# Patient Record
Sex: FEMALE | Race: WHITE | NOT HISPANIC OR LATINO | Employment: OTHER | URBAN - NONMETROPOLITAN AREA
[De-identification: names, ages, dates, MRNs, and addresses within clinical notes are randomized per-mention and may not be internally consistent; named-entity substitution may affect disease eponyms.]

---

## 2017-01-17 ENCOUNTER — TRANSCRIBE ORDERS (OUTPATIENT)
Dept: ADMINISTRATIVE | Facility: HOSPITAL | Age: 82
End: 2017-01-17

## 2017-01-17 DIAGNOSIS — Z12.31 ENCOUNTER FOR SCREENING MAMMOGRAM FOR MALIGNANT NEOPLASM OF BREAST: Primary | ICD-10-CM

## 2017-01-30 ENCOUNTER — HOSPITAL ENCOUNTER (OUTPATIENT)
Dept: MAMMOGRAPHY | Facility: HOSPITAL | Age: 82
Discharge: HOME | End: 2017-01-30
Attending: INTERNAL MEDICINE | Admitting: INTERNAL MEDICINE

## 2017-01-30 ENCOUNTER — HOSPITAL ENCOUNTER (OUTPATIENT)
Dept: MAMMOGRAPHY | Facility: HOSPITAL | Age: 82
Discharge: HOME | End: 2017-01-30
Attending: INTERNAL MEDICINE

## 2017-01-30 DIAGNOSIS — Z12.31 ENCOUNTER FOR SCREENING MAMMOGRAM FOR MALIGNANT NEOPLASM OF BREAST: ICD-10-CM

## 2017-01-30 DIAGNOSIS — R92.2 BREAST DENSITY: ICD-10-CM

## 2017-01-30 PROCEDURE — G0206 DX MAMMO INCL CAD UNI: HCPCS

## 2017-01-30 PROCEDURE — 77063 BREAST TOMOSYNTHESIS BI: CPT

## 2017-01-30 PROCEDURE — G0202 SCR MAMMO BI INCL CAD: HCPCS

## 2017-01-30 PROCEDURE — G0279 TOMOSYNTHESIS, MAMMO: HCPCS

## 2017-09-06 ENCOUNTER — ANESTHESIA EVENT (OUTPATIENT)
Dept: OPERATING ROOM | Age: 82
End: 2017-09-06

## 2017-09-07 ENCOUNTER — ANESTHESIA (OUTPATIENT)
Dept: OPERATING ROOM | Age: 82
End: 2017-09-07
Payer: MEDICARE

## 2017-09-07 ENCOUNTER — HOSPITAL ENCOUNTER (OUTPATIENT)
Age: 82
Setting detail: OUTPATIENT SURGERY
Discharge: HOME | End: 2017-09-07
Attending: OPHTHALMOLOGY | Admitting: OPHTHALMOLOGY

## 2017-09-07 VITALS
BODY MASS INDEX: 23.36 KG/M2 | WEIGHT: 119 LBS | DIASTOLIC BLOOD PRESSURE: 72 MMHG | HEART RATE: 71 BPM | SYSTOLIC BLOOD PRESSURE: 137 MMHG | TEMPERATURE: 98 F | RESPIRATION RATE: 18 BRPM | OXYGEN SATURATION: 96 % | HEIGHT: 60 IN

## 2017-09-07 VITALS — SYSTOLIC BLOOD PRESSURE: 128 MMHG | OXYGEN SATURATION: 98 % | DIASTOLIC BLOOD PRESSURE: 73 MMHG

## 2017-09-07 PROCEDURE — 00142 ANES PX ON EYE LENS SURGERY: CPT | Performed by: NURSE ANESTHETIST, CERTIFIED REGISTERED

## 2017-09-07 PROCEDURE — V2632 POST CHMBR INTRAOCULAR LENS: HCPCS | Performed by: OPHTHALMOLOGY

## 2017-09-07 PROCEDURE — G8918 PT W/O PREOP ORDER IV AB PRO: HCPCS

## 2017-09-07 PROCEDURE — G8907 PT DOC NO EVENTS ON DISCHARG: HCPCS

## 2017-09-07 PROCEDURE — 66984 XCAPSL CTRC RMVL W/O ECP: CPT

## 2017-09-07 DEVICE — LENS INTOCU +24.5 DIOPT L13MM DIA6MM 0DEG HAPTIC ANG A: Type: IMPLANTABLE DEVICE | Site: EYE | Status: FUNCTIONAL

## 2017-09-07 RX ORDER — FENTANYL CITRATE 50 UG/ML
INJECTION, SOLUTION INTRAMUSCULAR; INTRAVENOUS PRN
Status: DISCONTINUED | OUTPATIENT
Start: 2017-09-07 | End: 2017-09-07 | Stop reason: SDUPTHER

## 2017-09-07 RX ORDER — SODIUM CHLORIDE, SODIUM LACTATE, POTASSIUM CHLORIDE, CALCIUM CHLORIDE 600; 310; 30; 20 MG/100ML; MG/100ML; MG/100ML; MG/100ML
INJECTION, SOLUTION INTRAVENOUS CONTINUOUS
Status: DISCONTINUED | OUTPATIENT
Start: 2017-09-07 | End: 2017-09-07 | Stop reason: HOSPADM

## 2017-09-07 RX ORDER — LISINOPRIL 2.5 MG/1
2.5 TABLET ORAL DAILY
COMMUNITY

## 2017-09-07 RX ORDER — ERGOCALCIFEROL 1.25 MG/1
50000 CAPSULE ORAL WEEKLY
COMMUNITY

## 2017-09-07 RX ORDER — SODIUM CHLORIDE 0.9 % (FLUSH) 0.9 %
10 SYRINGE (ML) INJECTION EVERY 12 HOURS SCHEDULED
Status: DISCONTINUED | OUTPATIENT
Start: 2017-09-07 | End: 2017-09-07 | Stop reason: HOSPADM

## 2017-09-07 RX ORDER — MIDAZOLAM HYDROCHLORIDE 1 MG/ML
INJECTION INTRAMUSCULAR; INTRAVENOUS PRN
Status: DISCONTINUED | OUTPATIENT
Start: 2017-09-07 | End: 2017-09-07 | Stop reason: SDUPTHER

## 2017-09-07 RX ORDER — MOXIFLOXACIN 5 MG/ML
SOLUTION/ DROPS OPHTHALMIC PRN
Status: DISCONTINUED | OUTPATIENT
Start: 2017-09-07 | End: 2017-09-07 | Stop reason: HOSPADM

## 2017-09-07 RX ORDER — LIDOCAINE HYDROCHLORIDE 10 MG/ML
1 INJECTION, SOLUTION EPIDURAL; INFILTRATION; INTRACAUDAL; PERINEURAL
Status: DISCONTINUED | OUTPATIENT
Start: 2017-09-07 | End: 2017-09-07 | Stop reason: HOSPADM

## 2017-09-07 RX ORDER — SODIUM CHLORIDE 0.9 % (FLUSH) 0.9 %
10 SYRINGE (ML) INJECTION PRN
Status: DISCONTINUED | OUTPATIENT
Start: 2017-09-07 | End: 2017-09-07 | Stop reason: HOSPADM

## 2017-09-07 RX ORDER — TETRACAINE HYDROCHLORIDE 5 MG/ML
SOLUTION OPHTHALMIC PRN
Status: DISCONTINUED | OUTPATIENT
Start: 2017-09-07 | End: 2017-09-07 | Stop reason: HOSPADM

## 2017-09-07 RX ORDER — CALCIUM CARBONATE 500(1250)
1500 TABLET ORAL DAILY
COMMUNITY

## 2017-09-07 RX ORDER — PREDNISOLONE ACETATE 10 MG/ML
SUSPENSION/ DROPS OPHTHALMIC PRN
Status: DISCONTINUED | OUTPATIENT
Start: 2017-09-07 | End: 2017-09-07 | Stop reason: HOSPADM

## 2017-09-07 RX ADMIN — FENTANYL CITRATE 50 MCG: 50 INJECTION, SOLUTION INTRAMUSCULAR; INTRAVENOUS at 08:16

## 2017-09-07 RX ADMIN — MIDAZOLAM HYDROCHLORIDE 1 MG: 1 INJECTION INTRAMUSCULAR; INTRAVENOUS at 08:16

## 2017-09-07 RX ADMIN — SODIUM CHLORIDE, SODIUM LACTATE, POTASSIUM CHLORIDE, CALCIUM CHLORIDE: 600; 310; 30; 20 INJECTION, SOLUTION INTRAVENOUS at 07:27

## 2017-12-08 ENCOUNTER — APPOINTMENT (OUTPATIENT)
Dept: WOUND CARE | Facility: HOSPITAL | Age: 82
End: 2017-12-08

## 2017-12-08 PROCEDURE — G0463 HOSPITAL OUTPT CLINIC VISIT: HCPCS

## 2017-12-13 ENCOUNTER — APPOINTMENT (OUTPATIENT)
Dept: WOUND CARE | Facility: HOSPITAL | Age: 82
End: 2017-12-13

## 2017-12-22 ENCOUNTER — APPOINTMENT (OUTPATIENT)
Dept: WOUND CARE | Facility: HOSPITAL | Age: 82
End: 2017-12-22

## 2017-12-22 PROCEDURE — G0463 HOSPITAL OUTPT CLINIC VISIT: HCPCS

## 2017-12-29 ENCOUNTER — APPOINTMENT (OUTPATIENT)
Dept: WOUND CARE | Facility: HOSPITAL | Age: 82
End: 2017-12-29

## 2017-12-29 PROCEDURE — G0463 HOSPITAL OUTPT CLINIC VISIT: HCPCS

## 2018-02-23 ENCOUNTER — TRANSCRIBE ORDERS (OUTPATIENT)
Dept: ADMINISTRATIVE | Facility: HOSPITAL | Age: 83
End: 2018-02-23

## 2018-02-23 DIAGNOSIS — N64.4 MASTODYNIA: ICD-10-CM

## 2018-02-23 DIAGNOSIS — Z78.0 ASYMPTOMATIC MENOPAUSAL STATE: ICD-10-CM

## 2018-02-23 DIAGNOSIS — M85.80 OTHER SPECIFIED DISORDERS OF BONE DENSITY AND STRUCTURE, UNSPECIFIED SITE (CODE): ICD-10-CM

## 2018-02-23 DIAGNOSIS — Z12.31 ENCOUNTER FOR SCREENING MAMMOGRAM FOR MALIGNANT NEOPLASM OF BREAST: Primary | ICD-10-CM

## 2018-03-08 ENCOUNTER — HOSPITAL ENCOUNTER (OUTPATIENT)
Dept: BONE DENSITY | Facility: HOSPITAL | Age: 83
Discharge: HOME | End: 2018-03-08
Attending: INTERNAL MEDICINE

## 2018-03-08 ENCOUNTER — TRANSCRIBE ORDERS (OUTPATIENT)
Dept: ADMINISTRATIVE | Facility: HOSPITAL | Age: 83
End: 2018-03-08

## 2018-03-08 ENCOUNTER — HOSPITAL ENCOUNTER (OUTPATIENT)
Dept: ULTRASOUND IMAGING | Facility: HOSPITAL | Age: 83
Discharge: HOME | End: 2018-03-08
Attending: INTERNAL MEDICINE

## 2018-03-08 ENCOUNTER — HOSPITAL ENCOUNTER (OUTPATIENT)
Dept: MAMMOGRAPHY | Facility: HOSPITAL | Age: 83
Discharge: HOME | End: 2018-03-08
Attending: INTERNAL MEDICINE | Admitting: INTERNAL MEDICINE

## 2018-03-08 DIAGNOSIS — M85.80 OTHER SPECIFIED DISORDERS OF BONE DENSITY AND STRUCTURE, UNSPECIFIED SITE (CODE): ICD-10-CM

## 2018-03-08 DIAGNOSIS — Z78.0 ASYMPTOMATIC MENOPAUSAL STATE: ICD-10-CM

## 2018-03-08 DIAGNOSIS — N64.4 MASTODYNIA: Primary | ICD-10-CM

## 2018-03-08 DIAGNOSIS — N64.4 MASTODYNIA: ICD-10-CM

## 2018-03-08 DIAGNOSIS — Z12.31 ENCOUNTER FOR SCREENING MAMMOGRAM FOR MALIGNANT NEOPLASM OF BREAST: ICD-10-CM

## 2018-03-08 DIAGNOSIS — N64.4 BREAST PAIN, RIGHT: ICD-10-CM

## 2018-03-08 DIAGNOSIS — Z12.39 ENCOUNTER FOR SCREENING FOR MALIGNANT NEOPLASM OF BREAST: Primary | ICD-10-CM

## 2018-03-08 DIAGNOSIS — Z12.39 ENCOUNTER FOR SCREENING FOR MALIGNANT NEOPLASM OF BREAST: ICD-10-CM

## 2018-03-08 PROCEDURE — 77066 DX MAMMO INCL CAD BI: CPT

## 2018-03-08 PROCEDURE — G0279 TOMOSYNTHESIS, MAMMO: HCPCS

## 2018-03-08 PROCEDURE — 77080 DXA BONE DENSITY AXIAL: CPT

## 2018-03-08 PROCEDURE — 76642 ULTRASOUND BREAST LIMITED: CPT

## 2018-12-04 ENCOUNTER — HOSPITAL ENCOUNTER (OUTPATIENT)
Dept: GENERAL RADIOLOGY | Facility: HOSPITAL | Age: 83
Discharge: HOME | End: 2018-12-04
Admitting: NURSE PRACTITIONER

## 2018-12-04 PROCEDURE — 71046 X-RAY EXAM CHEST 2 VIEWS: CPT

## 2019-02-15 ENCOUNTER — TRANSCRIBE ORDERS (OUTPATIENT)
Dept: ADMINISTRATIVE | Facility: HOSPITAL | Age: 84
End: 2019-02-15

## 2019-02-15 DIAGNOSIS — Z12.31 ENCOUNTER FOR SCREENING MAMMOGRAM FOR MALIGNANT NEOPLASM OF BREAST: Primary | ICD-10-CM

## 2019-03-11 ENCOUNTER — HOSPITAL ENCOUNTER (OUTPATIENT)
Dept: MAMMOGRAPHY | Facility: HOSPITAL | Age: 84
Discharge: HOME | End: 2019-03-11
Admitting: INTERNAL MEDICINE

## 2019-03-11 DIAGNOSIS — Z12.31 ENCOUNTER FOR SCREENING MAMMOGRAM FOR MALIGNANT NEOPLASM OF BREAST: ICD-10-CM

## 2019-03-11 PROCEDURE — 77063 BREAST TOMOSYNTHESIS BI: CPT

## 2019-03-11 PROCEDURE — 77067 SCR MAMMO BI INCL CAD: CPT

## 2019-09-20 ENCOUNTER — HOSPITAL ENCOUNTER (EMERGENCY)
Facility: HOSPITAL | Age: 84
Discharge: HOME | End: 2019-09-20
Attending: EMERGENCY MEDICINE | Admitting: EMERGENCY MEDICINE

## 2019-09-20 ENCOUNTER — APPOINTMENT (OUTPATIENT)
Dept: ULTRASOUND IMAGING | Facility: HOSPITAL | Age: 84
End: 2019-09-20

## 2019-09-20 VITALS
RESPIRATION RATE: 18 BRPM | SYSTOLIC BLOOD PRESSURE: 129 MMHG | TEMPERATURE: 98.4 F | WEIGHT: 117 LBS | HEIGHT: 70 IN | DIASTOLIC BLOOD PRESSURE: 81 MMHG | HEART RATE: 98 BPM | OXYGEN SATURATION: 94 % | BODY MASS INDEX: 16.75 KG/M2

## 2019-09-20 DIAGNOSIS — L03.116 CELLULITIS OF LEFT LEG: Primary | ICD-10-CM

## 2019-09-20 PROCEDURE — 93971 EXTREMITY STUDY: CPT | Performed by: SURGERY

## 2019-09-20 PROCEDURE — 93971 EXTREMITY STUDY: CPT

## 2019-09-20 PROCEDURE — 99283 EMERGENCY DEPT VISIT LOW MDM: CPT

## 2019-09-20 RX ORDER — ASPIRIN 81 MG/1
81 TABLET, CHEWABLE ORAL DAILY
COMMUNITY
End: 2019-10-14

## 2019-09-21 NOTE — ED PROVIDER NOTES
Subjective   She presents complaining of swelling in her left lower leg.  She says she bumped her leg 2 weeks ago and had an open wound on the anterior shin.  She dressed and took care of it herself.  However she has had progressive swelling with redness along the medial aspect of her left calf.  Some of this redness and swelling goes down to her left foot.  Her daughter found out about it today and took her to the clinic to be examined.  They told her she had cellulitis but that she also needed to come get a study to make sure she did not have a clot in her leg.        History provided by:  Patient and relative   used: No    Leg Swelling   Location:  Left lower leg  Quality:  Swelling and warm  Severity:  Moderate  Onset quality:  Gradual  Duration:  2 weeks  Timing:  Constant  Progression:  Worsening  Chronicity:  New  Associated symptoms: no abdominal pain, no chest pain, no congestion, no cough, no diarrhea, no fatigue, no fever, no myalgias, no rash, no rhinorrhea and no vomiting        Review of Systems   Constitutional: Negative.  Negative for fatigue and fever.   HENT: Negative.  Negative for congestion and rhinorrhea.    Respiratory: Negative.  Negative for cough.    Cardiovascular: Negative.  Negative for chest pain.   Gastrointestinal: Negative.  Negative for abdominal pain, diarrhea and vomiting.   Genitourinary: Negative.    Musculoskeletal: Negative for myalgias.   Skin: Negative.  Negative for rash.   Neurological: Negative.    Psychiatric/Behavioral: Negative.    All other systems reviewed and are negative.      Past Medical History:   Diagnosis Date   • Breast cancer (CMS/HCC) 1995    right    • Hx of radiation therapy    • Hypertension    • Vitamin D deficiency        Allergies   Allergen Reactions   • Penicillins Hives       Past Surgical History:   Procedure Laterality Date   • BREAST LUMPECTOMY Right 1995   • BREAST LUMPECTOMY Right    • HYSTERECTOMY         Family History    Problem Relation Age of Onset   • Breast cancer Mother        Social History     Socioeconomic History   • Marital status:      Spouse name: Not on file   • Number of children: Not on file   • Years of education: Not on file   • Highest education level: Not on file   Tobacco Use   • Smoking status: Never Smoker   • Smokeless tobacco: Never Used   Substance and Sexual Activity   • Alcohol use: No   • Drug use: No           Objective   Physical Exam   Constitutional: She is oriented to person, place, and time. She appears well-developed and well-nourished.   Cardiovascular: Normal rate and regular rhythm.   Pulmonary/Chest: Effort normal and breath sounds normal.   Musculoskeletal: Normal range of motion. She exhibits edema.   Patient does have some swelling of the medial aspect of her left calf with a lot of redness and heat to the calf.  Some of the redness and heat extends into the foot.  She has good distal pulses and sensation and no proximal signs of injury.  She has a healing wound over the anterior shin had about the same level of all the swelling.   Neurological: She is alert and oriented to person, place, and time.   Skin: Skin is warm and dry. Capillary refill takes less than 2 seconds. There is erythema.   Psychiatric: She has a normal mood and affect. Her behavior is normal.   Nursing note and vitals reviewed.      Procedures           ED Course  ED Course as of Sep 21 0151   Sat Sep 21, 2019   0151 I told the patient that the ultrasound did not show any clot and I think the diagnosis was correct at the previous clinic.  She is on good medication and she discontinued that.  She is discharged in stable condition.  [TR]      ED Course User Index  [TR] Haroldo Renee Jr., MD                  [facility]  Number of Diagnoses or Management Options  Cellulitis of left leg: new and requires workup     Amount and/or Complexity of Data Reviewed  Tests in the radiology section of CPT®: ordered and  reviewed    Risk of Complications, Morbidity, and/or Mortality  Presenting problems: moderate  Diagnostic procedures: moderate  Management options: moderate    Patient Progress  Patient progress: stable      Final diagnoses:   Cellulitis of left leg              Haroldo Renee Jr., MD  09/21/19 0151

## 2019-12-05 ENCOUNTER — APPOINTMENT (OUTPATIENT)
Dept: GENERAL RADIOLOGY | Facility: HOSPITAL | Age: 84
End: 2019-12-05

## 2019-12-05 ENCOUNTER — HOSPITAL ENCOUNTER (EMERGENCY)
Facility: HOSPITAL | Age: 84
Discharge: HOME | End: 2019-12-06
Attending: INTERNAL MEDICINE | Admitting: INTERNAL MEDICINE

## 2019-12-05 DIAGNOSIS — S52.352B TYPE I OR II OPEN DISPLACED COMMINUTED FRACTURE OF SHAFT OF LEFT RADIUS, INITIAL ENCOUNTER: Primary | ICD-10-CM

## 2019-12-05 PROCEDURE — 25010000002 PROCHLORPERAZINE 10 MG/2ML SOLUTION: Performed by: INTERNAL MEDICINE

## 2019-12-05 PROCEDURE — 25010000002 HYDROMORPHONE PER 4 MG: Performed by: INTERNAL MEDICINE

## 2019-12-05 PROCEDURE — 73110 X-RAY EXAM OF WRIST: CPT

## 2019-12-05 PROCEDURE — 96365 THER/PROPH/DIAG IV INF INIT: CPT

## 2019-12-05 PROCEDURE — 99285 EMERGENCY DEPT VISIT HI MDM: CPT

## 2019-12-05 PROCEDURE — 25010000003 CEFAZOLIN PER 500 MG: Performed by: INTERNAL MEDICINE

## 2019-12-05 PROCEDURE — 96375 TX/PRO/DX INJ NEW DRUG ADDON: CPT

## 2019-12-05 RX ORDER — HYDROMORPHONE HYDROCHLORIDE 1 MG/ML
0.5 INJECTION, SOLUTION INTRAMUSCULAR; INTRAVENOUS; SUBCUTANEOUS ONCE
Status: COMPLETED | OUTPATIENT
Start: 2019-12-05 | End: 2019-12-05

## 2019-12-05 RX ORDER — KETAMINE HYDROCHLORIDE 50 MG/ML
0.5 INJECTION, SOLUTION, CONCENTRATE INTRAMUSCULAR; INTRAVENOUS ONCE
Status: DISCONTINUED | OUTPATIENT
Start: 2019-12-05 | End: 2019-12-06

## 2019-12-05 RX ORDER — PROCHLORPERAZINE EDISYLATE 5 MG/ML
10 INJECTION INTRAMUSCULAR; INTRAVENOUS ONCE
Status: COMPLETED | OUTPATIENT
Start: 2019-12-05 | End: 2019-12-05

## 2019-12-05 RX ORDER — LISINOPRIL 2.5 MG/1
2.5 TABLET ORAL DAILY
COMMUNITY

## 2019-12-05 RX ORDER — KETAMINE HYDROCHLORIDE 100 MG/ML
INJECTION INTRAMUSCULAR; INTRAVENOUS
Status: COMPLETED | OUTPATIENT
Start: 2019-12-05 | End: 2019-12-05

## 2019-12-05 RX ORDER — LIDOCAINE HYDROCHLORIDE 10 MG/ML
10 INJECTION, SOLUTION EPIDURAL; INFILTRATION; INTRACAUDAL; PERINEURAL ONCE
Status: DISCONTINUED | OUTPATIENT
Start: 2019-12-05 | End: 2019-12-06 | Stop reason: HOSPADM

## 2019-12-05 RX ADMIN — KETAMINE HYDROCHLORIDE 26.1 MG: 100 INJECTION INTRAMUSCULAR; INTRAVENOUS at 23:29

## 2019-12-05 RX ADMIN — SODIUM CHLORIDE 1 G: 900 INJECTION INTRAVENOUS at 22:28

## 2019-12-05 RX ADMIN — PROCHLORPERAZINE EDISYLATE 10 MG: 5 INJECTION INTRAMUSCULAR; INTRAVENOUS at 22:18

## 2019-12-05 RX ADMIN — HYDROMORPHONE HYDROCHLORIDE 0.5 MG: 1 INJECTION, SOLUTION INTRAMUSCULAR; INTRAVENOUS; SUBCUTANEOUS at 22:19

## 2019-12-06 ENCOUNTER — APPOINTMENT (OUTPATIENT)
Dept: GENERAL RADIOLOGY | Facility: HOSPITAL | Age: 84
End: 2019-12-06

## 2019-12-06 PROCEDURE — 73110 X-RAY EXAM OF WRIST: CPT

## 2019-12-06 RX ORDER — HYDROCODONE BITARTRATE AND ACETAMINOPHEN 7.5; 325 MG/1; MG/1
1 TABLET ORAL EVERY 4 HOURS PRN
Qty: 18 TABLET | Refills: 0 | Status: SHIPPED | OUTPATIENT
Start: 2019-12-06 | End: 2021-04-26

## 2019-12-06 RX ORDER — ONDANSETRON 4 MG/1
4 TABLET, ORALLY DISINTEGRATING ORAL 4 TIMES DAILY PRN
Qty: 15 TABLET | Refills: 0 | Status: SHIPPED | OUTPATIENT
Start: 2019-12-06 | End: 2021-04-26

## 2019-12-31 VITALS
BODY MASS INDEX: 21.16 KG/M2 | SYSTOLIC BLOOD PRESSURE: 151 MMHG | DIASTOLIC BLOOD PRESSURE: 89 MMHG | RESPIRATION RATE: 14 BRPM | WEIGHT: 115 LBS | OXYGEN SATURATION: 92 % | HEIGHT: 62 IN | TEMPERATURE: 98 F | HEART RATE: 85 BPM

## 2020-02-26 ENCOUNTER — TRANSCRIBE ORDERS (OUTPATIENT)
Dept: ADMINISTRATIVE | Facility: HOSPITAL | Age: 85
End: 2020-02-26

## 2020-02-26 DIAGNOSIS — Z78.0 ASYMPTOMATIC MENOPAUSAL STATE: ICD-10-CM

## 2020-02-26 DIAGNOSIS — Z12.31 ENCOUNTER FOR SCREENING MAMMOGRAM FOR MALIGNANT NEOPLASM OF BREAST: Primary | ICD-10-CM

## 2020-05-29 ENCOUNTER — HOSPITAL ENCOUNTER (OUTPATIENT)
Dept: BONE DENSITY | Facility: HOSPITAL | Age: 85
Discharge: HOME | End: 2020-05-29

## 2020-05-29 ENCOUNTER — HOSPITAL ENCOUNTER (OUTPATIENT)
Dept: MAMMOGRAPHY | Facility: HOSPITAL | Age: 85
Discharge: HOME | End: 2020-05-29
Admitting: INTERNAL MEDICINE

## 2020-05-29 DIAGNOSIS — Z12.31 ENCOUNTER FOR SCREENING MAMMOGRAM FOR MALIGNANT NEOPLASM OF BREAST: ICD-10-CM

## 2020-05-29 DIAGNOSIS — Z78.0 ASYMPTOMATIC MENOPAUSAL STATE: ICD-10-CM

## 2020-05-29 PROCEDURE — 77067 SCR MAMMO BI INCL CAD: CPT

## 2020-05-29 PROCEDURE — 77080 DXA BONE DENSITY AXIAL: CPT

## 2020-05-29 PROCEDURE — 77063 BREAST TOMOSYNTHESIS BI: CPT

## 2020-06-25 PROBLEM — Z78.0 ASYMPTOMATIC POSTMENOPAUSAL STATUS: Status: ACTIVE | Noted: 2020-06-25

## 2020-06-25 RX ORDER — ZOLEDRONIC ACID 5 MG/100ML
5 INJECTION, SOLUTION INTRAVENOUS ONCE
Status: CANCELLED | OUTPATIENT
Start: 2020-11-17

## 2020-06-25 RX ORDER — SODIUM CHLORIDE 9 MG/ML
250 INJECTION, SOLUTION INTRAVENOUS ONCE
Status: CANCELLED | OUTPATIENT
Start: 2020-11-17

## 2020-07-08 ENCOUNTER — TRANSCRIBE ORDERS (OUTPATIENT)
Dept: ADMINISTRATIVE | Facility: HOSPITAL | Age: 85
End: 2020-07-08

## 2020-07-08 ENCOUNTER — TELEPHONE (OUTPATIENT)
Dept: ONCOLOGY | Facility: HOSPITAL | Age: 85
End: 2020-07-08

## 2020-07-08 DIAGNOSIS — M81.0 AGE-RELATED OSTEOPOROSIS WITHOUT CURRENT PATHOLOGICAL FRACTURE: Primary | ICD-10-CM

## 2020-07-08 NOTE — TELEPHONE ENCOUNTER
Dr. Scott has faxed an order forReclast with note to call the patient's daughter, Wendy at [phone] to schedule.    I have called the number above as well as, the one in Epic [phone].    The daughter has called back to schedule.

## 2020-07-16 ENCOUNTER — APPOINTMENT (OUTPATIENT)
Dept: LAB | Facility: HOSPITAL | Age: 85
End: 2020-07-16

## 2020-07-16 ENCOUNTER — APPOINTMENT (OUTPATIENT)
Dept: ONCOLOGY | Facility: HOSPITAL | Age: 85
End: 2020-07-16

## 2020-09-29 ENCOUNTER — TELEPHONE (OUTPATIENT)
Dept: GASTROENTEROLOGY | Facility: CLINIC | Age: 85
End: 2020-09-29

## 2020-09-29 NOTE — TELEPHONE ENCOUNTER
I have sent 2 letters to pt re: recall colon and have had no response. I called and spoke to pt's daughter, Wendy. She tells me pt has some other health issues going on right now that they want to take care of prior to discussing a colonoscopy-they did receive my letters. She will talk to pt about it and if needed they will call the office back to schedule an appt. I did advise Wendy that at pt's age it may not be necessary especially if she isn't having any GI issues. I am removing pt from my recall list but they were advised to call back to schedule appt if they felt she wanted to proceed.

## 2020-11-17 ENCOUNTER — INFUSION (OUTPATIENT)
Dept: ONCOLOGY | Facility: HOSPITAL | Age: 85
End: 2020-11-17

## 2020-11-17 ENCOUNTER — LAB (OUTPATIENT)
Dept: LAB | Facility: HOSPITAL | Age: 85
End: 2020-11-17

## 2020-11-17 VITALS
HEART RATE: 66 BPM | RESPIRATION RATE: 18 BRPM | BODY MASS INDEX: 23.99 KG/M2 | DIASTOLIC BLOOD PRESSURE: 68 MMHG | SYSTOLIC BLOOD PRESSURE: 131 MMHG | HEIGHT: 57 IN | TEMPERATURE: 97.5 F | WEIGHT: 111.2 LBS | OXYGEN SATURATION: 96 %

## 2020-11-17 DIAGNOSIS — M81.0 AGE RELATED OSTEOPOROSIS, UNSPECIFIED PATHOLOGICAL FRACTURE PRESENCE: Primary | ICD-10-CM

## 2020-11-17 DIAGNOSIS — Z78.0 ASYMPTOMATIC POSTMENOPAUSAL STATUS: Primary | ICD-10-CM

## 2020-11-17 DIAGNOSIS — Z78.0 ASYMPTOMATIC POSTMENOPAUSAL STATUS: ICD-10-CM

## 2020-11-17 DIAGNOSIS — M81.0 AGE-RELATED OSTEOPOROSIS WITHOUT CURRENT PATHOLOGICAL FRACTURE: ICD-10-CM

## 2020-11-17 LAB
CALCIUM SPEC-SCNC: 9.2 MG/DL (ref 8.6–10.5)
CREAT SERPL-MCNC: 0.62 MG/DL (ref 0.57–1)
GFR SERPL CREATININE-BSD FRML MDRD: 91 ML/MIN/1.73
MAGNESIUM SERPL-MCNC: 2.1 MG/DL (ref 1.6–2.4)
PHOSPHATE SERPL-MCNC: 3.6 MG/DL (ref 2.5–4.5)
WHOLE BLOOD HOLD SPECIMEN: NORMAL

## 2020-11-17 PROCEDURE — 82310 ASSAY OF CALCIUM: CPT

## 2020-11-17 PROCEDURE — 83735 ASSAY OF MAGNESIUM: CPT

## 2020-11-17 PROCEDURE — 96365 THER/PROPH/DIAG IV INF INIT: CPT

## 2020-11-17 PROCEDURE — 36415 COLL VENOUS BLD VENIPUNCTURE: CPT

## 2020-11-17 PROCEDURE — 82565 ASSAY OF CREATININE: CPT

## 2020-11-17 PROCEDURE — 84100 ASSAY OF PHOSPHORUS: CPT

## 2020-11-17 PROCEDURE — 25010000002 ZOLEDRONIC ACID 5 MG/100ML SOLUTION: Performed by: INTERNAL MEDICINE

## 2020-11-17 RX ORDER — SODIUM CHLORIDE 9 MG/ML
250 INJECTION, SOLUTION INTRAVENOUS ONCE
Status: CANCELLED | OUTPATIENT
Start: 2021-11-17

## 2020-11-17 RX ORDER — ZOLEDRONIC ACID 5 MG/100ML
5 INJECTION, SOLUTION INTRAVENOUS ONCE
Status: CANCELLED | OUTPATIENT
Start: 2021-11-17

## 2020-11-17 RX ORDER — ZOLEDRONIC ACID 5 MG/100ML
5 INJECTION, SOLUTION INTRAVENOUS ONCE
Status: COMPLETED | OUTPATIENT
Start: 2020-11-17 | End: 2020-11-17

## 2020-11-17 RX ORDER — SODIUM CHLORIDE 9 MG/ML
250 INJECTION, SOLUTION INTRAVENOUS ONCE
Status: COMPLETED | OUTPATIENT
Start: 2020-11-17 | End: 2020-11-17

## 2020-11-17 RX ADMIN — SODIUM CHLORIDE 250 ML: 9 INJECTION, SOLUTION INTRAVENOUS at 14:06

## 2020-11-17 RX ADMIN — ZOLEDRONIC ACID 5 MG: 5 INJECTION, SOLUTION INTRAVENOUS at 14:06

## 2020-11-17 NOTE — PATIENT INSTRUCTIONS
Zoledronic Acid injection (Paget's Disease, Osteoporosis)  What is this medicine?  ZOLEDRONIC ACID (MODE le juaniton ik AS id) lowers the amount of calcium loss from bone. It is used to treat Paget's disease and osteoporosis in women.  This medicine may be used for other purposes; ask your health care provider or pharmacist if you have questions.  COMMON BRAND NAME(S): Reclast, Zometa  What should I tell my health care provider before I take this medicine?  They need to know if you have any of these conditions:  · aspirin-sensitive asthma  · cancer, especially if you are receiving medicines used to treat cancer  · dental disease or wear dentures  · infection  · kidney disease  · low levels of calcium in the blood  · past surgery on the parathyroid gland or intestines  · receiving corticosteroids like dexamethasone or prednisone  · an unusual or allergic reaction to zoledronic acid, other medicines, foods, dyes, or preservatives  · pregnant or trying to get pregnant  · breast-feeding  How should I use this medicine?  This medicine is for infusion into a vein. It is given by a health care professional in a hospital or clinic setting.  Talk to your pediatrician regarding the use of this medicine in children. This medicine is not approved for use in children.  Overdosage: If you think you have taken too much of this medicine contact a poison control center or emergency room at once.  NOTE: This medicine is only for you. Do not share this medicine with others.  What if I miss a dose?  It is important not to miss your dose. Call your doctor or health care professional if you are unable to keep an appointment.  What may interact with this medicine?  · certain antibiotics given by injection  · NSAIDs, medicines for pain and inflammation, like ibuprofen or naproxen  · some diuretics like bumetanide, furosemide  · teriparatide  This list may not describe all possible interactions. Give your health care provider a list of all the  medicines, herbs, non-prescription drugs, or dietary supplements you use. Also tell them if you smoke, drink alcohol, or use illegal drugs. Some items may interact with your medicine.  What should I watch for while using this medicine?  Visit your doctor or health care professional for regular checkups. It may be some time before you see the benefit from this medicine. Do not stop taking your medicine unless your doctor tells you to. Your doctor may order blood tests or other tests to see how you are doing.  Women should inform their doctor if they wish to become pregnant or think they might be pregnant. There is a potential for serious side effects to an unborn child. Talk to your health care professional or pharmacist for more information.  You should make sure that you get enough calcium and vitamin D while you are taking this medicine. Discuss the foods you eat and the vitamins you take with your health care professional.  Some people who take this medicine have severe bone, joint, and/or muscle pain. This medicine may also increase your risk for jaw problems or a broken thigh bone. Tell your doctor right away if you have severe pain in your jaw, bones, joints, or muscles. Tell your doctor if you have any pain that does not go away or that gets worse.  Tell your dentist and dental surgeon that you are taking this medicine. You should not have major dental surgery while on this medicine. See your dentist to have a dental exam and fix any dental problems before starting this medicine. Take good care of your teeth while on this medicine. Make sure you see your dentist for regular follow-up appointments.  What side effects may I notice from receiving this medicine?  Side effects that you should report to your doctor or health care professional as soon as possible:  · allergic reactions like skin rash, itching or hives, swelling of the face, lips, or tongue  · anxiety, confusion, or depression  · breathing  problems  · changes in vision  · eye pain  · feeling faint or lightheaded, falls  · jaw pain, especially after dental work  · mouth sores  · muscle cramps, stiffness, or weakness  · redness, blistering, peeling or loosening of the skin, including inside the mouth  · trouble passing urine or change in the amount of urine  Side effects that usually do not require medical attention (report to your doctor or health care professional if they continue or are bothersome):  · bone, joint, or muscle pain  · constipation  · diarrhea  · fever  · hair loss  · irritation at site where injected  · loss of appetite  · nausea, vomiting  · stomach upset  · trouble sleeping  · trouble swallowing  · weak or tired  This list may not describe all possible side effects. Call your doctor for medical advice about side effects. You may report side effects to FDA at 9-313-FDA-0107.  Where should I keep my medicine?  This drug is given in a hospital or clinic and will not be stored at home.  NOTE: This sheet is a summary. It may not cover all possible information. If you have questions about this medicine, talk to your doctor, pharmacist, or health care provider.  © 2020 [employer]/Gold Standard (2015-05-16 14:19:57)

## 2021-04-26 ENCOUNTER — HOSPITAL ENCOUNTER (OUTPATIENT)
Dept: GENERAL RADIOLOGY | Facility: HOSPITAL | Age: 86
Discharge: HOME | End: 2021-04-26
Admitting: NURSE PRACTITIONER

## 2021-04-26 PROCEDURE — 71046 X-RAY EXAM CHEST 2 VIEWS: CPT

## 2021-06-01 ENCOUNTER — TRANSCRIBE ORDERS (OUTPATIENT)
Dept: ADMINISTRATIVE | Facility: HOSPITAL | Age: 86
End: 2021-06-01

## 2021-06-01 DIAGNOSIS — Z12.31 ENCOUNTER FOR SCREENING MAMMOGRAM FOR MALIGNANT NEOPLASM OF BREAST: Primary | ICD-10-CM

## 2021-06-02 ENCOUNTER — HOSPITAL ENCOUNTER (OUTPATIENT)
Dept: MAMMOGRAPHY | Facility: HOSPITAL | Age: 86
Discharge: HOME | End: 2021-06-02
Admitting: PHYSICIAN ASSISTANT

## 2021-06-02 DIAGNOSIS — Z12.31 ENCOUNTER FOR SCREENING MAMMOGRAM FOR MALIGNANT NEOPLASM OF BREAST: ICD-10-CM

## 2021-06-02 PROCEDURE — 77067 SCR MAMMO BI INCL CAD: CPT

## 2021-06-02 PROCEDURE — 77063 BREAST TOMOSYNTHESIS BI: CPT

## 2022-02-28 ENCOUNTER — TRANSCRIBE ORDERS (OUTPATIENT)
Dept: ADMINISTRATIVE | Facility: HOSPITAL | Age: 87
End: 2022-02-28

## 2022-02-28 DIAGNOSIS — M81.0 AGE-RELATED OSTEOPOROSIS WITHOUT CURRENT PATHOLOGICAL FRACTURE: Primary | ICD-10-CM

## 2022-06-08 ENCOUNTER — HOSPITAL ENCOUNTER (OUTPATIENT)
Dept: BONE DENSITY | Facility: HOSPITAL | Age: 87
Discharge: HOME | End: 2022-06-08

## 2022-06-08 ENCOUNTER — HOSPITAL ENCOUNTER (OUTPATIENT)
Dept: MAMMOGRAPHY | Facility: HOSPITAL | Age: 87
Discharge: HOME | End: 2022-06-08

## 2022-06-08 ENCOUNTER — TRANSCRIBE ORDERS (OUTPATIENT)
Dept: ADMINISTRATIVE | Facility: HOSPITAL | Age: 87
End: 2022-06-08

## 2022-06-08 DIAGNOSIS — M81.0 AGE-RELATED OSTEOPOROSIS WITHOUT CURRENT PATHOLOGICAL FRACTURE: ICD-10-CM

## 2022-06-08 DIAGNOSIS — Z12.31 ENCOUNTER FOR SCREENING MAMMOGRAM FOR MALIGNANT NEOPLASM OF BREAST: Primary | ICD-10-CM

## 2022-06-08 PROCEDURE — 77080 DXA BONE DENSITY AXIAL: CPT

## 2022-06-08 PROCEDURE — 77067 SCR MAMMO BI INCL CAD: CPT

## 2022-06-08 PROCEDURE — 77063 BREAST TOMOSYNTHESIS BI: CPT

## 2022-06-17 ENCOUNTER — HOSPITAL ENCOUNTER (INPATIENT)
Facility: HOSPITAL | Age: 87
LOS: 6 days | Discharge: HOME | End: 2022-06-24
Attending: INTERNAL MEDICINE | Admitting: INTERNAL MEDICINE

## 2022-06-17 ENCOUNTER — APPOINTMENT (OUTPATIENT)
Dept: CT IMAGING | Facility: HOSPITAL | Age: 87
End: 2022-06-17

## 2022-06-17 ENCOUNTER — APPOINTMENT (OUTPATIENT)
Dept: GENERAL RADIOLOGY | Facility: HOSPITAL | Age: 87
End: 2022-06-17

## 2022-06-17 DIAGNOSIS — Z74.09 IMPAIRED FUNCTIONAL MOBILITY AND ACTIVITY TOLERANCE: ICD-10-CM

## 2022-06-17 DIAGNOSIS — R41.0 CONFUSION: ICD-10-CM

## 2022-06-17 DIAGNOSIS — E87.1 HYPONATREMIA: Primary | ICD-10-CM

## 2022-06-17 LAB
ALBUMIN SERPL-MCNC: 3.7 G/DL (ref 3.5–5.2)
ALBUMIN/GLOB SERPL: 0.9 G/DL
ALP SERPL-CCNC: 64 U/L (ref 39–117)
ALT SERPL W P-5'-P-CCNC: 57 U/L (ref 1–33)
ANION GAP SERPL CALCULATED.3IONS-SCNC: 11 MMOL/L (ref 5–15)
ANION GAP SERPL CALCULATED.3IONS-SCNC: 12 MMOL/L (ref 5–15)
ANISOCYTOSIS BLD QL: ABNORMAL
AST SERPL-CCNC: 55 U/L (ref 1–32)
BACTERIA UR QL AUTO: ABNORMAL /HPF
BILIRUB SERPL-MCNC: 0.5 MG/DL (ref 0–1.2)
BILIRUB UR QL STRIP: NEGATIVE
BUN SERPL-MCNC: 12 MG/DL (ref 8–23)
BUN SERPL-MCNC: 13 MG/DL (ref 8–23)
BUN/CREAT SERPL: 17.1 (ref 7–25)
BUN/CREAT SERPL: 19.7 (ref 7–25)
BURR CELLS BLD QL SMEAR: ABNORMAL
CALCIUM SPEC-SCNC: 7.8 MG/DL (ref 8.2–9.6)
CALCIUM SPEC-SCNC: 8.5 MG/DL (ref 8.2–9.6)
CHLORIDE SERPL-SCNC: 88 MMOL/L (ref 98–107)
CHLORIDE SERPL-SCNC: 90 MMOL/L (ref 98–107)
CLARITY UR: CLEAR
CO2 SERPL-SCNC: 22 MMOL/L (ref 22–29)
CO2 SERPL-SCNC: 24 MMOL/L (ref 22–29)
COLOR UR: ABNORMAL
CREAT SERPL-MCNC: 0.61 MG/DL (ref 0.57–1)
CREAT SERPL-MCNC: 0.76 MG/DL (ref 0.57–1)
D-LACTATE SERPL-SCNC: 1.3 MMOL/L (ref 0.5–2)
DEPRECATED RDW RBC AUTO: 45.1 FL (ref 37–54)
EGFRCR SERPLBLD CKD-EPI 2021: 74.5 ML/MIN/1.73
EGFRCR SERPLBLD CKD-EPI 2021: 85.1 ML/MIN/1.73
ERYTHROCYTE [DISTWIDTH] IN BLOOD BY AUTOMATED COUNT: 13.5 % (ref 12.3–15.4)
GLOBULIN UR ELPH-MCNC: 4.2 GM/DL
GLUCOSE SERPL-MCNC: 103 MG/DL (ref 65–99)
GLUCOSE SERPL-MCNC: 111 MG/DL (ref 65–99)
GLUCOSE UR STRIP-MCNC: NEGATIVE MG/DL
HCT VFR BLD AUTO: 45.8 % (ref 34–46.6)
HGB BLD-MCNC: 15.6 G/DL (ref 12–15.9)
HGB UR QL STRIP.AUTO: ABNORMAL
HOLD SPECIMEN: NORMAL
HYALINE CASTS UR QL AUTO: ABNORMAL /LPF
KETONES UR QL STRIP: ABNORMAL
LEUKOCYTE ESTERASE UR QL STRIP.AUTO: NEGATIVE
LIPASE SERPL-CCNC: 21 U/L (ref 13–60)
LYMPHOCYTES # BLD MANUAL: 2.13 10*3/MM3 (ref 0.7–3.1)
LYMPHOCYTES NFR BLD MANUAL: 2 % (ref 5–12)
MACROCYTES BLD QL SMEAR: ABNORMAL
MAGNESIUM SERPL-MCNC: 1.9 MG/DL (ref 1.6–2.4)
MCH RBC QN AUTO: 30.7 PG (ref 26.6–33)
MCHC RBC AUTO-ENTMCNC: 34.1 G/DL (ref 31.5–35.7)
MCV RBC AUTO: 90.2 FL (ref 79–97)
METAMYELOCYTES NFR BLD MANUAL: 1 % (ref 0–0)
MONOCYTES # BLD: 0.18 10*3/MM3 (ref 0.1–0.9)
MYELOCYTES NFR BLD MANUAL: 1 % (ref 0–0)
NEUTROPHILS # BLD AUTO: 6.4 10*3/MM3 (ref 1.7–7)
NEUTROPHILS NFR BLD MANUAL: 65 % (ref 42.7–76)
NEUTS BAND NFR BLD MANUAL: 7 % (ref 0–5)
NITRITE UR QL STRIP: NEGATIVE
OSMOLALITY SERPL: 256 MOSM/KG (ref 280–301)
OSMOLALITY UR: 637 MOSM/KG (ref 50–1400)
PH UR STRIP.AUTO: 5.5 [PH] (ref 5–8)
PLAT MORPH BLD: NORMAL
PLATELET # BLD AUTO: 247 10*3/MM3 (ref 140–450)
PMV BLD AUTO: 9.4 FL (ref 6–12)
POIKILOCYTOSIS BLD QL SMEAR: ABNORMAL
POLYCHROMASIA BLD QL SMEAR: ABNORMAL
POTASSIUM SERPL-SCNC: 4.2 MMOL/L (ref 3.5–5.2)
POTASSIUM SERPL-SCNC: 4.3 MMOL/L (ref 3.5–5.2)
PROCALCITONIN SERPL-MCNC: 0.67 NG/ML (ref 0–0.25)
PROT SERPL-MCNC: 7.9 G/DL (ref 6–8.5)
PROT UR QL STRIP: ABNORMAL
RBC # BLD AUTO: 5.08 10*6/MM3 (ref 3.77–5.28)
RBC # UR STRIP: ABNORMAL /HPF
REF LAB TEST METHOD: ABNORMAL
SARS-COV-2 RNA PNL SPEC NAA+PROBE: NOT DETECTED
SODIUM SERPL-SCNC: 123 MMOL/L (ref 136–145)
SODIUM SERPL-SCNC: 124 MMOL/L (ref 136–145)
SODIUM UR-SCNC: 88 MMOL/L
SP GR UR STRIP: 1.02 (ref 1–1.03)
SQUAMOUS #/AREA URNS HPF: ABNORMAL /HPF
UROBILINOGEN UR QL STRIP: ABNORMAL
VARIANT LYMPHS NFR BLD MANUAL: 20 % (ref 19.6–45.3)
VARIANT LYMPHS NFR BLD MANUAL: 4 % (ref 0–5)
WBC # UR STRIP: ABNORMAL /HPF
WBC MORPH BLD: NORMAL
WBC NRBC COR # BLD: 8.89 10*3/MM3 (ref 3.4–10.8)
WHOLE BLOOD HOLD COAG: NORMAL
WHOLE BLOOD HOLD SPECIMEN: NORMAL

## 2022-06-17 PROCEDURE — 71045 X-RAY EXAM CHEST 1 VIEW: CPT

## 2022-06-17 PROCEDURE — 83930 ASSAY OF BLOOD OSMOLALITY: CPT | Performed by: FAMILY MEDICINE

## 2022-06-17 PROCEDURE — 84145 PROCALCITONIN (PCT): CPT | Performed by: PHYSICIAN ASSISTANT

## 2022-06-17 PROCEDURE — 83935 ASSAY OF URINE OSMOLALITY: CPT | Performed by: FAMILY MEDICINE

## 2022-06-17 PROCEDURE — 83735 ASSAY OF MAGNESIUM: CPT | Performed by: FAMILY MEDICINE

## 2022-06-17 PROCEDURE — 70450 CT HEAD/BRAIN W/O DYE: CPT

## 2022-06-17 PROCEDURE — 81001 URINALYSIS AUTO W/SCOPE: CPT | Performed by: PHYSICIAN ASSISTANT

## 2022-06-17 PROCEDURE — G0378 HOSPITAL OBSERVATION PER HR: HCPCS

## 2022-06-17 PROCEDURE — 99284 EMERGENCY DEPT VISIT MOD MDM: CPT

## 2022-06-17 PROCEDURE — 74176 CT ABD & PELVIS W/O CONTRAST: CPT

## 2022-06-17 PROCEDURE — 87635 SARS-COV-2 COVID-19 AMP PRB: CPT | Performed by: EMERGENCY MEDICINE

## 2022-06-17 PROCEDURE — 84300 ASSAY OF URINE SODIUM: CPT | Performed by: FAMILY MEDICINE

## 2022-06-17 PROCEDURE — 85007 BL SMEAR W/DIFF WBC COUNT: CPT | Performed by: PHYSICIAN ASSISTANT

## 2022-06-17 PROCEDURE — 83690 ASSAY OF LIPASE: CPT | Performed by: PHYSICIAN ASSISTANT

## 2022-06-17 PROCEDURE — 80053 COMPREHEN METABOLIC PANEL: CPT | Performed by: PHYSICIAN ASSISTANT

## 2022-06-17 PROCEDURE — 83605 ASSAY OF LACTIC ACID: CPT | Performed by: PHYSICIAN ASSISTANT

## 2022-06-17 PROCEDURE — 85025 COMPLETE CBC W/AUTO DIFF WBC: CPT | Performed by: PHYSICIAN ASSISTANT

## 2022-06-17 PROCEDURE — 82570 ASSAY OF URINE CREATININE: CPT | Performed by: FAMILY MEDICINE

## 2022-06-17 PROCEDURE — 87040 BLOOD CULTURE FOR BACTERIA: CPT | Performed by: PHYSICIAN ASSISTANT

## 2022-06-17 RX ORDER — SODIUM CHLORIDE 0.9 % (FLUSH) 0.9 %
10 SYRINGE (ML) INJECTION AS NEEDED
Status: DISCONTINUED | OUTPATIENT
Start: 2022-06-17 | End: 2022-06-24 | Stop reason: HOSPADM

## 2022-06-17 RX ORDER — CALCIUM CARBONATE 500(1250)
500 TABLET ORAL DAILY
Status: DISCONTINUED | OUTPATIENT
Start: 2022-06-18 | End: 2022-06-24 | Stop reason: HOSPADM

## 2022-06-17 RX ORDER — LISINOPRIL 2.5 MG/1
2.5 TABLET ORAL DAILY
Status: DISCONTINUED | OUTPATIENT
Start: 2022-06-18 | End: 2022-06-24 | Stop reason: HOSPADM

## 2022-06-17 RX ORDER — ONDANSETRON 2 MG/ML
4 INJECTION INTRAMUSCULAR; INTRAVENOUS EVERY 4 HOURS PRN
Status: DISCONTINUED | OUTPATIENT
Start: 2022-06-17 | End: 2022-06-24 | Stop reason: HOSPADM

## 2022-06-17 RX ORDER — FUROSEMIDE 20 MG/1
20 TABLET ORAL ONCE
Status: COMPLETED | OUTPATIENT
Start: 2022-06-17 | End: 2022-06-18

## 2022-06-17 RX ORDER — SULFAMETHOXAZOLE AND TRIMETHOPRIM 800; 160 MG/1; MG/1
1 TABLET ORAL 2 TIMES DAILY
Status: ON HOLD | COMMUNITY
Start: 2022-06-13 | End: 2022-06-21

## 2022-06-17 RX ORDER — ACETAMINOPHEN 500 MG
1000 TABLET ORAL ONCE
Status: DISCONTINUED | OUTPATIENT
Start: 2022-06-17 | End: 2022-06-18

## 2022-06-17 RX ORDER — SODIUM CHLORIDE 0.9 % (FLUSH) 0.9 %
10 SYRINGE (ML) INJECTION EVERY 12 HOURS SCHEDULED
Status: DISCONTINUED | OUTPATIENT
Start: 2022-06-17 | End: 2022-06-24 | Stop reason: HOSPADM

## 2022-06-17 RX ORDER — ENOXAPARIN SODIUM 100 MG/ML
40 INJECTION SUBCUTANEOUS
Status: DISCONTINUED | OUTPATIENT
Start: 2022-06-18 | End: 2022-06-24 | Stop reason: HOSPADM

## 2022-06-17 RX ORDER — SODIUM CHLORIDE 9 MG/ML
125 INJECTION, SOLUTION INTRAVENOUS CONTINUOUS
Status: DISCONTINUED | OUTPATIENT
Start: 2022-06-17 | End: 2022-06-20

## 2022-06-17 RX ORDER — SODIUM CHLORIDE 9 MG/ML
100 INJECTION, SOLUTION INTRAVENOUS CONTINUOUS
Status: DISCONTINUED | OUTPATIENT
Start: 2022-06-17 | End: 2022-06-18

## 2022-06-17 RX ADMIN — Medication 10 ML: at 22:40

## 2022-06-17 RX ADMIN — SODIUM CHLORIDE, POTASSIUM CHLORIDE, SODIUM LACTATE AND CALCIUM CHLORIDE 1000 ML: 600; 310; 30; 20 INJECTION, SOLUTION INTRAVENOUS at 18:51

## 2022-06-17 RX ADMIN — SODIUM CHLORIDE 100 ML/HR: 9 INJECTION, SOLUTION INTRAVENOUS at 22:39

## 2022-06-18 PROBLEM — I10 HYPERTENSION: Status: ACTIVE | Noted: 2022-06-18

## 2022-06-18 PROBLEM — G93.41 ACUTE METABOLIC ENCEPHALOPATHY: Status: ACTIVE | Noted: 2022-06-18

## 2022-06-18 LAB
ANION GAP SERPL CALCULATED.3IONS-SCNC: 10 MMOL/L (ref 5–15)
BUN SERPL-MCNC: 10 MG/DL (ref 8–23)
BUN/CREAT SERPL: 16.1 (ref 7–25)
CALCIUM SPEC-SCNC: 6.8 MG/DL (ref 8.2–9.6)
CHLORIDE SERPL-SCNC: 92 MMOL/L (ref 98–107)
CO2 SERPL-SCNC: 21 MMOL/L (ref 22–29)
CORTIS SERPL-MCNC: 37.2 MCG/DL
CREAT SERPL-MCNC: 0.62 MG/DL (ref 0.57–1)
CREAT UR-MCNC: 136 MG/DL
EGFRCR SERPLBLD CKD-EPI 2021: 84.7 ML/MIN/1.73
GLUCOSE SERPL-MCNC: 111 MG/DL (ref 65–99)
MAGNESIUM SERPL-MCNC: 1.8 MG/DL (ref 1.6–2.4)
POTASSIUM SERPL-SCNC: 3.9 MMOL/L (ref 3.5–5.2)
SODIUM SERPL-SCNC: 123 MMOL/L (ref 136–145)
TSH SERPL DL<=0.05 MIU/L-ACNC: 1.15 UIU/ML (ref 0.27–4.2)

## 2022-06-18 PROCEDURE — 25010000002 ENOXAPARIN PER 10 MG: Performed by: FAMILY MEDICINE

## 2022-06-18 PROCEDURE — 80048 BASIC METABOLIC PNL TOTAL CA: CPT | Performed by: FAMILY MEDICINE

## 2022-06-18 PROCEDURE — 83735 ASSAY OF MAGNESIUM: CPT | Performed by: FAMILY MEDICINE

## 2022-06-18 PROCEDURE — 82088 ASSAY OF ALDOSTERONE: CPT | Performed by: FAMILY MEDICINE

## 2022-06-18 PROCEDURE — 84443 ASSAY THYROID STIM HORMONE: CPT | Performed by: FAMILY MEDICINE

## 2022-06-18 PROCEDURE — 82533 TOTAL CORTISOL: CPT | Performed by: FAMILY MEDICINE

## 2022-06-18 RX ORDER — SODIUM BICARBONATE 650 MG/1
650 TABLET ORAL 2 TIMES DAILY
Status: DISCONTINUED | OUTPATIENT
Start: 2022-06-18 | End: 2022-06-21

## 2022-06-18 RX ORDER — ACETAMINOPHEN 325 MG/1
650 TABLET ORAL EVERY 6 HOURS PRN
Status: DISCONTINUED | OUTPATIENT
Start: 2022-06-18 | End: 2022-06-24 | Stop reason: HOSPADM

## 2022-06-18 RX ADMIN — ACETAMINOPHEN 650 MG: 325 TABLET ORAL at 15:27

## 2022-06-18 RX ADMIN — SODIUM BICARBONATE 650 MG: 650 TABLET ORAL at 21:20

## 2022-06-18 RX ADMIN — LISINOPRIL 2.5 MG: 2.5 TABLET ORAL at 09:17

## 2022-06-18 RX ADMIN — ENOXAPARIN SODIUM 40 MG: 100 INJECTION SUBCUTANEOUS at 09:17

## 2022-06-18 RX ADMIN — FUROSEMIDE 20 MG: 20 TABLET ORAL at 06:21

## 2022-06-18 RX ADMIN — Medication 500 MG: at 09:17

## 2022-06-19 LAB
25(OH)D3 SERPL-MCNC: 44.1 NG/ML (ref 30–100)
ANION GAP SERPL CALCULATED.3IONS-SCNC: 14 MMOL/L (ref 5–15)
BUN SERPL-MCNC: 14 MG/DL (ref 8–23)
BUN/CREAT SERPL: 28.6 (ref 7–25)
CALCIUM SPEC-SCNC: 7.8 MG/DL (ref 8.2–9.6)
CHLORIDE SERPL-SCNC: 91 MMOL/L (ref 98–107)
CO2 SERPL-SCNC: 21 MMOL/L (ref 22–29)
CREAT SERPL-MCNC: 0.49 MG/DL (ref 0.57–1)
EGFRCR SERPLBLD CKD-EPI 2021: 89.7 ML/MIN/1.73
GLUCOSE SERPL-MCNC: 122 MG/DL (ref 65–99)
MAGNESIUM SERPL-MCNC: 2 MG/DL (ref 1.6–2.4)
POTASSIUM SERPL-SCNC: 3.8 MMOL/L (ref 3.5–5.2)
PTH-INTACT SERPL-MCNC: 29.5 PG/ML (ref 15–65)
SODIUM SERPL-SCNC: 126 MMOL/L (ref 136–145)
URATE SERPL-MCNC: 2.1 MG/DL (ref 2.4–5.7)
UUN 24H UR-MCNC: 613 MG/DL

## 2022-06-19 PROCEDURE — 84550 ASSAY OF BLOOD/URIC ACID: CPT | Performed by: INTERNAL MEDICINE

## 2022-06-19 PROCEDURE — 84540 ASSAY OF URINE/UREA-N: CPT | Performed by: FAMILY MEDICINE

## 2022-06-19 PROCEDURE — 83735 ASSAY OF MAGNESIUM: CPT | Performed by: FAMILY MEDICINE

## 2022-06-19 PROCEDURE — 80048 BASIC METABOLIC PNL TOTAL CA: CPT | Performed by: FAMILY MEDICINE

## 2022-06-19 PROCEDURE — 25010000002 ENOXAPARIN PER 10 MG: Performed by: FAMILY MEDICINE

## 2022-06-19 PROCEDURE — 82306 VITAMIN D 25 HYDROXY: CPT | Performed by: INTERNAL MEDICINE

## 2022-06-19 PROCEDURE — 83970 ASSAY OF PARATHORMONE: CPT | Performed by: INTERNAL MEDICINE

## 2022-06-19 RX ORDER — SODIUM CHLORIDE 0.9 % (FLUSH) 0.9 %
10 SYRINGE (ML) INJECTION AS NEEDED
Status: DISCONTINUED | OUTPATIENT
Start: 2022-06-19 | End: 2022-06-24 | Stop reason: HOSPADM

## 2022-06-19 RX ORDER — SODIUM CHLORIDE 0.9 % (FLUSH) 0.9 %
10 SYRINGE (ML) INJECTION EVERY 12 HOURS SCHEDULED
Status: DISCONTINUED | OUTPATIENT
Start: 2022-06-19 | End: 2022-06-24 | Stop reason: HOSPADM

## 2022-06-19 RX ADMIN — Medication 500 MG: at 08:35

## 2022-06-19 RX ADMIN — SODIUM BICARBONATE 650 MG: 650 TABLET ORAL at 20:20

## 2022-06-19 RX ADMIN — SODIUM BICARBONATE 650 MG: 650 TABLET ORAL at 08:35

## 2022-06-19 RX ADMIN — ENOXAPARIN SODIUM 40 MG: 100 INJECTION SUBCUTANEOUS at 08:45

## 2022-06-19 RX ADMIN — LISINOPRIL 2.5 MG: 2.5 TABLET ORAL at 08:35

## 2022-06-19 RX ADMIN — ACETAMINOPHEN 650 MG: 325 TABLET ORAL at 20:20

## 2022-06-20 PROBLEM — D69.6 THROMBOCYTOPENIA (HCC): Status: ACTIVE | Noted: 2022-06-20

## 2022-06-20 PROBLEM — R50.9 FEVER: Status: ACTIVE | Noted: 2022-06-20

## 2022-06-20 PROBLEM — R74.8 ELEVATED LIVER ENZYMES: Status: ACTIVE | Noted: 2022-06-20

## 2022-06-20 LAB
ALBUMIN SERPL-MCNC: 2.8 G/DL (ref 3.5–5.2)
ALP SERPL-CCNC: 49 U/L (ref 39–117)
ALT SERPL W P-5'-P-CCNC: 32 U/L (ref 1–33)
AMORPH URATE CRY URNS QL MICRO: ABNORMAL /HPF
ANION GAP SERPL CALCULATED.3IONS-SCNC: 11 MMOL/L (ref 5–15)
AST SERPL-CCNC: 43 U/L (ref 1–32)
BACTERIA UR QL AUTO: ABNORMAL /HPF
BILIRUB CONJ SERPL-MCNC: <0.2 MG/DL (ref 0–0.3)
BILIRUB INDIRECT SERPL-MCNC: ABNORMAL MG/DL
BILIRUB SERPL-MCNC: 0.5 MG/DL (ref 0–1.2)
BILIRUB UR QL STRIP: NEGATIVE
BUN SERPL-MCNC: 14 MG/DL (ref 8–23)
BUN/CREAT SERPL: 31.1 (ref 7–25)
BURR CELLS BLD QL SMEAR: ABNORMAL
CALCIUM SPEC-SCNC: 7.2 MG/DL (ref 8.2–9.6)
CHLORIDE SERPL-SCNC: 89 MMOL/L (ref 98–107)
CLARITY UR: CLEAR
CO2 SERPL-SCNC: 22 MMOL/L (ref 22–29)
COLOR UR: ABNORMAL
CREAT SERPL-MCNC: 0.45 MG/DL (ref 0.57–1)
DEPRECATED RDW RBC AUTO: 41.7 FL (ref 37–54)
EGFRCR SERPLBLD CKD-EPI 2021: 91 ML/MIN/1.73
ERYTHROCYTE [DISTWIDTH] IN BLOOD BY AUTOMATED COUNT: 13.2 % (ref 12.3–15.4)
ERYTHROCYTE [SEDIMENTATION RATE] IN BLOOD: 29 MM/HR (ref 0–30)
GLUCOSE SERPL-MCNC: 128 MG/DL (ref 65–99)
GLUCOSE UR STRIP-MCNC: NEGATIVE MG/DL
HCT VFR BLD AUTO: 41.7 % (ref 34–46.6)
HGB BLD-MCNC: 14.8 G/DL (ref 12–15.9)
HGB UR QL STRIP.AUTO: ABNORMAL
HYALINE CASTS UR QL AUTO: ABNORMAL /LPF
KETONES UR QL STRIP: ABNORMAL
LEUKOCYTE ESTERASE UR QL STRIP.AUTO: NEGATIVE
LYMPHOCYTES # BLD MANUAL: 0.21 10*3/MM3 (ref 0.7–3.1)
LYMPHOCYTES NFR BLD MANUAL: 3.1 % (ref 5–12)
MCH RBC QN AUTO: 30.8 PG (ref 26.6–33)
MCHC RBC AUTO-ENTMCNC: 35.5 G/DL (ref 31.5–35.7)
MCV RBC AUTO: 86.9 FL (ref 79–97)
MONOCYTES # BLD: 0.32 10*3/MM3 (ref 0.1–0.9)
MUCOUS THREADS URNS QL MICRO: ABNORMAL /HPF
NEUTROPHILS # BLD AUTO: 9.82 10*3/MM3 (ref 1.7–7)
NEUTROPHILS NFR BLD MANUAL: 87.5 % (ref 42.7–76)
NEUTS BAND NFR BLD MANUAL: 7.3 % (ref 0–5)
NEUTS VAC BLD QL SMEAR: ABNORMAL
NITRITE UR QL STRIP: NEGATIVE
OSMOLALITY UR: 465 MOSM/KG (ref 50–1400)
PH UR STRIP.AUTO: 6 [PH] (ref 5–8)
PLATELET # BLD AUTO: 123 10*3/MM3 (ref 140–450)
PMV BLD AUTO: 10.7 FL (ref 6–12)
POIKILOCYTOSIS BLD QL SMEAR: ABNORMAL
POTASSIUM SERPL-SCNC: 4.3 MMOL/L (ref 3.5–5.2)
PROCALCITONIN SERPL-MCNC: 1.03 NG/ML (ref 0–0.25)
PROT SERPL-MCNC: 6.3 G/DL (ref 6–8.5)
PROT UR QL STRIP: ABNORMAL
RBC # BLD AUTO: 4.8 10*6/MM3 (ref 3.77–5.28)
RBC # UR STRIP: ABNORMAL /HPF
REF LAB TEST METHOD: ABNORMAL
SMALL PLATELETS BLD QL SMEAR: ABNORMAL
SODIUM SERPL-SCNC: 122 MMOL/L (ref 136–145)
SODIUM UR-SCNC: <20 MMOL/L
SP GR UR STRIP: 1.02 (ref 1–1.03)
SQUAMOUS #/AREA URNS HPF: ABNORMAL /HPF
UROBILINOGEN UR QL STRIP: ABNORMAL
VARIANT LYMPHS NFR BLD MANUAL: 1 % (ref 0–5)
VARIANT LYMPHS NFR BLD MANUAL: 1 % (ref 19.6–45.3)
WBC # UR STRIP: ABNORMAL /HPF
WBC NRBC COR # BLD: 10.36 10*3/MM3 (ref 3.4–10.8)

## 2022-06-20 PROCEDURE — 83935 ASSAY OF URINE OSMOLALITY: CPT | Performed by: INTERNAL MEDICINE

## 2022-06-20 PROCEDURE — 85007 BL SMEAR W/DIFF WBC COUNT: CPT | Performed by: INTERNAL MEDICINE

## 2022-06-20 PROCEDURE — 80076 HEPATIC FUNCTION PANEL: CPT | Performed by: INTERNAL MEDICINE

## 2022-06-20 PROCEDURE — 85652 RBC SED RATE AUTOMATED: CPT | Performed by: INTERNAL MEDICINE

## 2022-06-20 PROCEDURE — 86666 EHRLICHIA ANTIBODY: CPT | Performed by: INTERNAL MEDICINE

## 2022-06-20 PROCEDURE — 87086 URINE CULTURE/COLONY COUNT: CPT | Performed by: INTERNAL MEDICINE

## 2022-06-20 PROCEDURE — 85025 COMPLETE CBC W/AUTO DIFF WBC: CPT | Performed by: INTERNAL MEDICINE

## 2022-06-20 PROCEDURE — 84300 ASSAY OF URINE SODIUM: CPT | Performed by: INTERNAL MEDICINE

## 2022-06-20 PROCEDURE — 81001 URINALYSIS AUTO W/SCOPE: CPT | Performed by: INTERNAL MEDICINE

## 2022-06-20 PROCEDURE — 25010000002 ENOXAPARIN PER 10 MG: Performed by: FAMILY MEDICINE

## 2022-06-20 PROCEDURE — 80048 BASIC METABOLIC PNL TOTAL CA: CPT | Performed by: FAMILY MEDICINE

## 2022-06-20 PROCEDURE — 87040 BLOOD CULTURE FOR BACTERIA: CPT | Performed by: INTERNAL MEDICINE

## 2022-06-20 PROCEDURE — 87798 DETECT AGENT NOS DNA AMP: CPT | Performed by: INTERNAL MEDICINE

## 2022-06-20 PROCEDURE — 86757 RICKETTSIA ANTIBODY: CPT | Performed by: INTERNAL MEDICINE

## 2022-06-20 PROCEDURE — 84145 PROCALCITONIN (PCT): CPT | Performed by: INTERNAL MEDICINE

## 2022-06-20 RX ORDER — TOLVAPTAN 15 MG/1
15 TABLET ORAL ONCE
Status: COMPLETED | OUTPATIENT
Start: 2022-06-20 | End: 2022-06-20

## 2022-06-20 RX ORDER — SODIUM CHLORIDE 1000 MG
1 TABLET, SOLUBLE MISCELLANEOUS
Status: DISCONTINUED | OUTPATIENT
Start: 2022-06-20 | End: 2022-06-24 | Stop reason: HOSPADM

## 2022-06-20 RX ADMIN — SODIUM BICARBONATE 650 MG: 650 TABLET ORAL at 21:20

## 2022-06-20 RX ADMIN — ENOXAPARIN SODIUM 40 MG: 100 INJECTION SUBCUTANEOUS at 09:16

## 2022-06-20 RX ADMIN — DOXYCYCLINE 100 MG: 100 INJECTION, POWDER, LYOPHILIZED, FOR SOLUTION INTRAVENOUS at 21:20

## 2022-06-20 RX ADMIN — Medication 500 MG: at 09:17

## 2022-06-20 RX ADMIN — SODIUM BICARBONATE 650 MG: 650 TABLET ORAL at 09:17

## 2022-06-20 RX ADMIN — LISINOPRIL 2.5 MG: 2.5 TABLET ORAL at 09:17

## 2022-06-20 RX ADMIN — TOLVAPTAN 15 MG: 15 TABLET ORAL at 16:11

## 2022-06-20 RX ADMIN — Medication 10 ML: at 21:20

## 2022-06-20 RX ADMIN — Medication 10 ML: at 09:17

## 2022-06-20 RX ADMIN — DOXYCYCLINE 100 MG: 100 INJECTION, POWDER, LYOPHILIZED, FOR SOLUTION INTRAVENOUS at 09:16

## 2022-06-20 RX ADMIN — Medication 1 G: at 17:36

## 2022-06-21 LAB
ALBUMIN SERPL-MCNC: 2.8 G/DL (ref 3.5–5.2)
ALBUMIN/GLOB SERPL: 0.9 G/DL
ALP SERPL-CCNC: 45 U/L (ref 39–117)
ALT SERPL W P-5'-P-CCNC: 30 U/L (ref 1–33)
ANION GAP SERPL CALCULATED.3IONS-SCNC: 5 MMOL/L (ref 5–15)
ANISOCYTOSIS BLD QL: ABNORMAL
AST SERPL-CCNC: 40 U/L (ref 1–32)
BACTERIA SPEC AEROBE CULT: NORMAL
BILIRUB SERPL-MCNC: 0.5 MG/DL (ref 0–1.2)
BUN SERPL-MCNC: 15 MG/DL (ref 8–23)
BUN/CREAT SERPL: 30.6 (ref 7–25)
CALCIUM SPEC-SCNC: 7.6 MG/DL (ref 8.2–9.6)
CHLORIDE SERPL-SCNC: 94 MMOL/L (ref 98–107)
CO2 SERPL-SCNC: 29 MMOL/L (ref 22–29)
CREAT SERPL-MCNC: 0.49 MG/DL (ref 0.57–1)
CRP SERPL-MCNC: 3.46 MG/DL (ref 0–0.5)
DEPRECATED RDW RBC AUTO: 43 FL (ref 37–54)
EGFRCR SERPLBLD CKD-EPI 2021: 89.1 ML/MIN/1.73
ERYTHROCYTE [DISTWIDTH] IN BLOOD BY AUTOMATED COUNT: 13.2 % (ref 12.3–15.4)
GIANT PLATELETS: ABNORMAL
GLOBULIN UR ELPH-MCNC: 3.1 GM/DL
GLUCOSE SERPL-MCNC: 126 MG/DL (ref 65–99)
HCT VFR BLD AUTO: 41.8 % (ref 34–46.6)
HGB BLD-MCNC: 14.6 G/DL (ref 12–15.9)
LYMPHOCYTES # BLD MANUAL: 1.02 10*3/MM3 (ref 0.7–3.1)
LYMPHOCYTES NFR BLD MANUAL: 4 % (ref 5–12)
MCH RBC QN AUTO: 30.7 PG (ref 26.6–33)
MCHC RBC AUTO-ENTMCNC: 34.9 G/DL (ref 31.5–35.7)
MCV RBC AUTO: 88 FL (ref 79–97)
MONOCYTES # BLD: 0.34 10*3/MM3 (ref 0.1–0.9)
NEUTROPHILS # BLD AUTO: 7.17 10*3/MM3 (ref 1.7–7)
NEUTROPHILS NFR BLD MANUAL: 80 % (ref 42.7–76)
NEUTS BAND NFR BLD MANUAL: 4 % (ref 0–5)
PLATELET # BLD AUTO: 123 10*3/MM3 (ref 140–450)
PMV BLD AUTO: 11.5 FL (ref 6–12)
POTASSIUM SERPL-SCNC: 3.7 MMOL/L (ref 3.5–5.2)
PROT SERPL-MCNC: 5.9 G/DL (ref 6–8.5)
RBC # BLD AUTO: 4.75 10*6/MM3 (ref 3.77–5.28)
SMALL PLATELETS BLD QL SMEAR: ABNORMAL
SODIUM SERPL-SCNC: 128 MMOL/L (ref 136–145)
VARIANT LYMPHS NFR BLD MANUAL: 12 % (ref 19.6–45.3)
WBC MORPH BLD: NORMAL
WBC NRBC COR # BLD: 8.54 10*3/MM3 (ref 3.4–10.8)

## 2022-06-21 PROCEDURE — 25010000002 ENOXAPARIN PER 10 MG: Performed by: FAMILY MEDICINE

## 2022-06-21 PROCEDURE — 85007 BL SMEAR W/DIFF WBC COUNT: CPT | Performed by: INTERNAL MEDICINE

## 2022-06-21 PROCEDURE — 80053 COMPREHEN METABOLIC PANEL: CPT | Performed by: INTERNAL MEDICINE

## 2022-06-21 PROCEDURE — 86140 C-REACTIVE PROTEIN: CPT | Performed by: INTERNAL MEDICINE

## 2022-06-21 PROCEDURE — 85025 COMPLETE CBC W/AUTO DIFF WBC: CPT | Performed by: INTERNAL MEDICINE

## 2022-06-21 RX ORDER — DOXYCYCLINE 100 MG/1
100 TABLET ORAL EVERY 12 HOURS SCHEDULED
Status: DISCONTINUED | OUTPATIENT
Start: 2022-06-21 | End: 2022-06-24 | Stop reason: HOSPADM

## 2022-06-21 RX ADMIN — Medication 10 ML: at 20:20

## 2022-06-21 RX ADMIN — SODIUM BICARBONATE 650 MG: 650 TABLET ORAL at 08:53

## 2022-06-21 RX ADMIN — DOXYCYCLINE 100 MG: 100 INJECTION, POWDER, LYOPHILIZED, FOR SOLUTION INTRAVENOUS at 08:53

## 2022-06-21 RX ADMIN — Medication 1 G: at 08:53

## 2022-06-21 RX ADMIN — Medication 1 G: at 13:40

## 2022-06-21 RX ADMIN — DOXYCYCLINE 100 MG: 100 TABLET, FILM COATED ORAL at 20:20

## 2022-06-21 RX ADMIN — Medication 500 MG: at 08:53

## 2022-06-21 RX ADMIN — Medication 1 G: at 17:36

## 2022-06-21 RX ADMIN — ENOXAPARIN SODIUM 40 MG: 100 INJECTION SUBCUTANEOUS at 08:53

## 2022-06-21 RX ADMIN — LISINOPRIL 2.5 MG: 2.5 TABLET ORAL at 08:53

## 2022-06-22 LAB
ANION GAP SERPL CALCULATED.3IONS-SCNC: 3 MMOL/L (ref 5–15)
BACTERIA SPEC AEROBE CULT: NORMAL
BACTERIA SPEC AEROBE CULT: NORMAL
BUN SERPL-MCNC: 14 MG/DL (ref 8–23)
BUN/CREAT SERPL: 33.3 (ref 7–25)
CALCIUM SPEC-SCNC: 7.5 MG/DL (ref 8.2–9.6)
CHLORIDE SERPL-SCNC: 96 MMOL/L (ref 98–107)
CO2 SERPL-SCNC: 30 MMOL/L (ref 22–29)
CREAT SERPL-MCNC: 0.42 MG/DL (ref 0.57–1)
EGFRCR SERPLBLD CKD-EPI 2021: 92.5 ML/MIN/1.73
GLUCOSE SERPL-MCNC: 101 MG/DL (ref 65–99)
POTASSIUM SERPL-SCNC: 3.8 MMOL/L (ref 3.5–5.2)
SODIUM SERPL-SCNC: 129 MMOL/L (ref 136–145)

## 2022-06-22 PROCEDURE — 80048 BASIC METABOLIC PNL TOTAL CA: CPT | Performed by: FAMILY MEDICINE

## 2022-06-22 PROCEDURE — 86788 WEST NILE VIRUS AB IGM: CPT | Performed by: INTERNAL MEDICINE

## 2022-06-22 PROCEDURE — 25010000002 ENOXAPARIN PER 10 MG: Performed by: FAMILY MEDICINE

## 2022-06-22 PROCEDURE — 86789 WEST NILE VIRUS ANTIBODY: CPT | Performed by: INTERNAL MEDICINE

## 2022-06-22 PROCEDURE — 97162 PT EVAL MOD COMPLEX 30 MIN: CPT

## 2022-06-22 RX ADMIN — DOXYCYCLINE 100 MG: 100 TABLET, FILM COATED ORAL at 08:33

## 2022-06-22 RX ADMIN — Medication 1 G: at 08:15

## 2022-06-22 RX ADMIN — LISINOPRIL 2.5 MG: 2.5 TABLET ORAL at 08:33

## 2022-06-22 RX ADMIN — DOXYCYCLINE 100 MG: 100 TABLET, FILM COATED ORAL at 20:22

## 2022-06-22 RX ADMIN — ENOXAPARIN SODIUM 40 MG: 100 INJECTION SUBCUTANEOUS at 08:33

## 2022-06-22 RX ADMIN — Medication 10 ML: at 20:22

## 2022-06-22 RX ADMIN — Medication 1 G: at 17:33

## 2022-06-22 RX ADMIN — Medication 500 MG: at 08:33

## 2022-06-22 RX ADMIN — Medication 1 G: at 12:30

## 2022-06-23 LAB
ANION GAP SERPL CALCULATED.3IONS-SCNC: 2 MMOL/L (ref 5–15)
ANION GAP SERPL CALCULATED.3IONS-SCNC: 2 MMOL/L (ref 5–15)
ANION GAP SERPL CALCULATED.3IONS-SCNC: 7 MMOL/L (ref 5–15)
BUN SERPL-MCNC: 14 MG/DL (ref 8–23)
BUN SERPL-MCNC: 15 MG/DL (ref 8–23)
BUN SERPL-MCNC: 16 MG/DL (ref 8–23)
BUN/CREAT SERPL: 31.3 (ref 7–25)
BUN/CREAT SERPL: 35 (ref 7–25)
BUN/CREAT SERPL: 36.4 (ref 7–25)
CALCIUM SPEC-SCNC: 7.7 MG/DL (ref 8.2–9.6)
CALCIUM SPEC-SCNC: 7.9 MG/DL (ref 8.2–9.6)
CALCIUM SPEC-SCNC: 8.8 MG/DL (ref 8.2–9.6)
CHLORIDE SERPL-SCNC: 95 MMOL/L (ref 98–107)
CHLORIDE SERPL-SCNC: 96 MMOL/L (ref 98–107)
CHLORIDE SERPL-SCNC: 97 MMOL/L (ref 98–107)
CO2 SERPL-SCNC: 30 MMOL/L (ref 22–29)
CREAT SERPL-MCNC: 0.4 MG/DL (ref 0.57–1)
CREAT SERPL-MCNC: 0.44 MG/DL (ref 0.57–1)
CREAT SERPL-MCNC: 0.48 MG/DL (ref 0.57–1)
EGFRCR SERPLBLD CKD-EPI 2021: 89.5 ML/MIN/1.73
EGFRCR SERPLBLD CKD-EPI 2021: 91.4 ML/MIN/1.73
EGFRCR SERPLBLD CKD-EPI 2021: 93.6 ML/MIN/1.73
GLUCOSE SERPL-MCNC: 105 MG/DL (ref 65–99)
GLUCOSE SERPL-MCNC: 118 MG/DL (ref 65–99)
GLUCOSE SERPL-MCNC: 155 MG/DL (ref 65–99)
POTASSIUM SERPL-SCNC: 3.9 MMOL/L (ref 3.5–5.2)
POTASSIUM SERPL-SCNC: 4.2 MMOL/L (ref 3.5–5.2)
POTASSIUM SERPL-SCNC: 4.4 MMOL/L (ref 3.5–5.2)
SODIUM SERPL-SCNC: 127 MMOL/L (ref 136–145)
SODIUM SERPL-SCNC: 128 MMOL/L (ref 136–145)
SODIUM SERPL-SCNC: 134 MMOL/L (ref 136–145)

## 2022-06-23 PROCEDURE — 97116 GAIT TRAINING THERAPY: CPT

## 2022-06-23 PROCEDURE — 80048 BASIC METABOLIC PNL TOTAL CA: CPT | Performed by: FAMILY MEDICINE

## 2022-06-23 PROCEDURE — 25010000002 ENOXAPARIN PER 10 MG: Performed by: FAMILY MEDICINE

## 2022-06-23 PROCEDURE — 80048 BASIC METABOLIC PNL TOTAL CA: CPT | Performed by: INTERNAL MEDICINE

## 2022-06-23 RX ORDER — TOLVAPTAN 15 MG/1
15 TABLET ORAL ONCE
Status: COMPLETED | OUTPATIENT
Start: 2022-06-23 | End: 2022-06-23

## 2022-06-23 RX ORDER — FUROSEMIDE 20 MG/1
20 TABLET ORAL DAILY
Status: DISCONTINUED | OUTPATIENT
Start: 2022-06-23 | End: 2022-06-24 | Stop reason: HOSPADM

## 2022-06-23 RX ADMIN — DOXYCYCLINE 100 MG: 100 TABLET, FILM COATED ORAL at 20:27

## 2022-06-23 RX ADMIN — Medication 1 G: at 08:45

## 2022-06-23 RX ADMIN — LISINOPRIL 2.5 MG: 2.5 TABLET ORAL at 08:45

## 2022-06-23 RX ADMIN — ENOXAPARIN SODIUM 40 MG: 100 INJECTION SUBCUTANEOUS at 08:46

## 2022-06-23 RX ADMIN — Medication 500 MG: at 08:45

## 2022-06-23 RX ADMIN — TOLVAPTAN 15 MG: 15 TABLET ORAL at 13:33

## 2022-06-23 RX ADMIN — DOXYCYCLINE 100 MG: 100 TABLET, FILM COATED ORAL at 08:45

## 2022-06-23 RX ADMIN — Medication 1 G: at 13:28

## 2022-06-23 RX ADMIN — FUROSEMIDE 20 MG: 20 TABLET ORAL at 17:53

## 2022-06-23 RX ADMIN — Medication 1 G: at 17:28

## 2022-06-24 ENCOUNTER — READMISSION MANAGEMENT (OUTPATIENT)
Dept: CALL CENTER | Facility: HOSPITAL | Age: 87
End: 2022-06-24

## 2022-06-24 VITALS
RESPIRATION RATE: 16 BRPM | OXYGEN SATURATION: 95 % | SYSTOLIC BLOOD PRESSURE: 154 MMHG | HEIGHT: 61 IN | BODY MASS INDEX: 20.16 KG/M2 | HEART RATE: 81 BPM | WEIGHT: 106.8 LBS | DIASTOLIC BLOOD PRESSURE: 94 MMHG | TEMPERATURE: 98.7 F

## 2022-06-24 LAB
ALDOST SERPL-MCNC: <1 NG/DL (ref 0–30)
ANION GAP SERPL CALCULATED.3IONS-SCNC: 5 MMOL/L (ref 5–15)
ANION GAP SERPL CALCULATED.3IONS-SCNC: 5 MMOL/L (ref 5–15)
BUN SERPL-MCNC: 12 MG/DL (ref 8–23)
BUN SERPL-MCNC: 14 MG/DL (ref 8–23)
BUN/CREAT SERPL: 27.3 (ref 7–25)
BUN/CREAT SERPL: 31.8 (ref 7–25)
CALCIUM SPEC-SCNC: 8.1 MG/DL (ref 8.2–9.6)
CALCIUM SPEC-SCNC: 8.3 MG/DL (ref 8.2–9.6)
CHLORIDE SERPL-SCNC: 101 MMOL/L (ref 98–107)
CHLORIDE SERPL-SCNC: 98 MMOL/L (ref 98–107)
CO2 SERPL-SCNC: 29 MMOL/L (ref 22–29)
CO2 SERPL-SCNC: 30 MMOL/L (ref 22–29)
CREAT SERPL-MCNC: 0.44 MG/DL (ref 0.57–1)
CREAT SERPL-MCNC: 0.44 MG/DL (ref 0.57–1)
EGFRCR SERPLBLD CKD-EPI 2021: 91.4 ML/MIN/1.73
EGFRCR SERPLBLD CKD-EPI 2021: 91.4 ML/MIN/1.73
GLUCOSE SERPL-MCNC: 102 MG/DL (ref 65–99)
GLUCOSE SERPL-MCNC: 114 MG/DL (ref 65–99)
POTASSIUM SERPL-SCNC: 4.3 MMOL/L (ref 3.5–5.2)
POTASSIUM SERPL-SCNC: 4.3 MMOL/L (ref 3.5–5.2)
SODIUM SERPL-SCNC: 132 MMOL/L (ref 136–145)
SODIUM SERPL-SCNC: 136 MMOL/L (ref 136–145)
WNV IGG SER QL IA: POSITIVE
WNV IGM SER QL IA: NEGATIVE

## 2022-06-24 PROCEDURE — 80048 BASIC METABOLIC PNL TOTAL CA: CPT | Performed by: INTERNAL MEDICINE

## 2022-06-24 PROCEDURE — 25010000002 ENOXAPARIN PER 10 MG: Performed by: FAMILY MEDICINE

## 2022-06-24 RX ORDER — DOXYCYCLINE 100 MG/1
100 TABLET ORAL EVERY 12 HOURS SCHEDULED
Qty: 6 TABLET | Refills: 0 | Status: SHIPPED | OUTPATIENT
Start: 2022-06-24 | End: 2022-06-27

## 2022-06-24 RX ADMIN — DOXYCYCLINE 100 MG: 100 TABLET, FILM COATED ORAL at 09:01

## 2022-06-24 RX ADMIN — FUROSEMIDE 20 MG: 20 TABLET ORAL at 09:01

## 2022-06-24 RX ADMIN — Medication 1 G: at 09:01

## 2022-06-24 RX ADMIN — ENOXAPARIN SODIUM 40 MG: 100 INJECTION SUBCUTANEOUS at 09:04

## 2022-06-24 RX ADMIN — LISINOPRIL 2.5 MG: 2.5 TABLET ORAL at 09:01

## 2022-06-24 RX ADMIN — Medication 500 MG: at 09:01

## 2022-06-25 LAB — BACTERIA SPEC AEROBE CULT: NORMAL

## 2022-06-25 NOTE — OUTREACH NOTE
Prep Survey    Flowsheet Row Responses   [facility] patient discharged from? [place]   Is LACE score < 7 ? No   Emergency Room discharge w/ pulse ox? No   Eligibility Readm Mgmt   Discharge diagnosis Hyponatremia   Does the patient have one of the following disease processes/diagnoses(primary or secondary)? Other   Does the patient have Home health ordered? No   Is there a DME ordered? No   Comments regarding appointments see AVS   Prep survey completed? Yes          JAK BRENNER - Registered Nurse

## 2022-06-26 LAB
A PHAGOCYTOPH DNA BLD QL NAA+PROBE: NEGATIVE
E CHAFFEENSIS DNA BLD QL NAA+PROBE: POSITIVE

## 2022-06-28 ENCOUNTER — READMISSION MANAGEMENT (OUTPATIENT)
Dept: CALL CENTER | Facility: HOSPITAL | Age: 87
End: 2022-06-28

## 2022-06-28 NOTE — OUTREACH NOTE
Medical Week 1 Survey    Flowsheet Row Responses   [facility] patient discharged from? [place]   Does the patient have one of the following disease processes/diagnoses(primary or secondary)? Other   Week 1 attempt successful? No   Unsuccessful attempts Attempt 1          RAULITO BOATENG - Licensed Nurse

## 2022-06-29 LAB
A PHAGOCYTOPH IGG TITR SER IF: NEGATIVE {TITER}
A PHAGOCYTOPH IGM TITR SER IF: NEGATIVE {TITER}
E CHAFFEENSIS IGG TITR SER IF: NEGATIVE {TITER}
E CHAFFEENSIS IGM TITR SER IF: NEGATIVE {TITER}
R RICKETTSI IGG SER QL IA: POSITIVE
R RICKETTSI IGG TITR SER IF: NORMAL {TITER}
R RICKETTSI IGM SER-ACNC: 0.29 INDEX (ref 0–0.89)

## 2022-07-05 ENCOUNTER — READMISSION MANAGEMENT (OUTPATIENT)
Dept: CALL CENTER | Facility: HOSPITAL | Age: 87
End: 2022-07-05

## 2022-07-05 NOTE — OUTREACH NOTE
Medical Week 2 Survey    Flowsheet Row Responses   [facility] patient discharged from? [place]   Does the patient have one of the following disease processes/diagnoses(primary or secondary)? Other   Week 2 attempt successful? No   Unsuccessful attempts Attempt 1          RAULITO BOATENG - Licensed Nurse

## 2022-07-08 ENCOUNTER — READMISSION MANAGEMENT (OUTPATIENT)
Dept: CALL CENTER | Facility: HOSPITAL | Age: 87
End: 2022-07-08

## 2022-07-08 NOTE — OUTREACH NOTE
Medical Week 2 Survey    Flowsheet Row Responses   [facility] patient discharged from? [place]   Does the patient have one of the following disease processes/diagnoses(primary or secondary)? Other   Week 2 attempt successful? No   Unsuccessful attempts Attempt 2          QUETA COHEN - Nell Nurse

## 2022-07-13 LAB
ANION GAP SERPL CALCULATED.3IONS-SCNC: 9 MMOL/L (ref 7–19)
BUN BLDV-MCNC: 12 MG/DL (ref 8–23)
CALCIUM SERPL-MCNC: 9.2 MG/DL (ref 8.2–9.6)
CHLORIDE BLD-SCNC: 94 MMOL/L (ref 98–111)
CO2: 27 MMOL/L (ref 22–29)
CREAT SERPL-MCNC: 0.6 MG/DL (ref 0.5–0.9)
GFR AFRICAN AMERICAN: >59
GFR NON-AFRICAN AMERICAN: >60
GLUCOSE BLD-MCNC: 134 MG/DL (ref 74–109)
POTASSIUM SERPL-SCNC: 4.5 MMOL/L (ref 3.5–5)
SODIUM BLD-SCNC: 130 MMOL/L (ref 136–145)

## 2023-03-08 RX ORDER — SODIUM CHLORIDE 9 MG/ML
250 INJECTION, SOLUTION INTRAVENOUS ONCE
OUTPATIENT
Start: 2023-03-08

## 2023-03-08 RX ORDER — ZOLEDRONIC ACID 5 MG/100ML
5 INJECTION, SOLUTION INTRAVENOUS ONCE
Start: 2023-03-08 | End: 2023-03-08

## 2024-01-19 ENCOUNTER — HOSPITAL ENCOUNTER (INPATIENT)
Facility: HOSPITAL | Age: 89
LOS: 3 days | Discharge: NURSING, REHAB OR LONG-TERM CARE | DRG: 536 | End: 2024-01-26
Attending: INTERNAL MEDICINE | Admitting: INTERNAL MEDICINE
Payer: MEDICARE

## 2024-01-19 ENCOUNTER — APPOINTMENT (OUTPATIENT)
Dept: GENERAL RADIOLOGY | Facility: HOSPITAL | Age: 89
DRG: 536 | End: 2024-01-19
Payer: MEDICARE

## 2024-01-19 ENCOUNTER — APPOINTMENT (OUTPATIENT)
Dept: CT IMAGING | Facility: HOSPITAL | Age: 89
DRG: 536 | End: 2024-01-19
Payer: MEDICARE
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
## 2024-01-19 DIAGNOSIS — R26.2 AMBULATORY DYSFUNCTION: ICD-10-CM

## 2024-01-19 DIAGNOSIS — Z74.09 IMPAIRED MOBILITY: ICD-10-CM

## 2024-01-19 DIAGNOSIS — S32.502A CLOSED FRACTURE OF LEFT PUBIS, UNSPECIFIED PORTION OF PUBIS, INITIAL ENCOUNTER: ICD-10-CM

## 2024-01-19 PROBLEM — S32.9XXA PELVIC FRACTURE: Status: ACTIVE | Noted: 2024-01-19

## 2024-01-19 LAB
ALBUMIN SERPL-MCNC: 3.3 G/DL (ref 3.5–5.2)
ALBUMIN/GLOB SERPL: 1 G/DL
ALP SERPL-CCNC: 65 U/L (ref 39–117)
ALT SERPL W P-5'-P-CCNC: 11 U/L (ref 1–33)
ANION GAP SERPL CALCULATED.3IONS-SCNC: 9 MMOL/L (ref 5–15)
APTT PPP: 27.8 SECONDS (ref 24.5–36)
AST SERPL-CCNC: 17 U/L (ref 1–32)
BASOPHILS # BLD AUTO: 0.03 10*3/MM3 (ref 0–0.2)
BASOPHILS NFR BLD AUTO: 0.4 % (ref 0–1.5)
BILIRUB SERPL-MCNC: 0.4 MG/DL (ref 0–1.2)
BUN SERPL-MCNC: 19 MG/DL (ref 8–23)
BUN/CREAT SERPL: 30.2 (ref 7–25)
CALCIUM SPEC-SCNC: 8.5 MG/DL (ref 8.2–9.6)
CHLORIDE SERPL-SCNC: 98 MMOL/L (ref 98–107)
CO2 SERPL-SCNC: 26 MMOL/L (ref 22–29)
CREAT SERPL-MCNC: 0.63 MG/DL (ref 0.57–1)
DEPRECATED RDW RBC AUTO: 46.1 FL (ref 37–54)
EGFRCR SERPLBLD CKD-EPI 2021: 83.3 ML/MIN/1.73
EOSINOPHIL # BLD AUTO: 0.07 10*3/MM3 (ref 0–0.4)
EOSINOPHIL NFR BLD AUTO: 1 % (ref 0.3–6.2)
ERYTHROCYTE [DISTWIDTH] IN BLOOD BY AUTOMATED COUNT: 13.9 % (ref 12.3–15.4)
GLOBULIN UR ELPH-MCNC: 3.2 GM/DL
GLUCOSE SERPL-MCNC: 92 MG/DL (ref 65–99)
HCT VFR BLD AUTO: 40.5 % (ref 34–46.6)
HGB BLD-MCNC: 13.5 G/DL (ref 12–15.9)
IMM GRANULOCYTES # BLD AUTO: 0.02 10*3/MM3 (ref 0–0.05)
IMM GRANULOCYTES NFR BLD AUTO: 0.3 % (ref 0–0.5)
INR PPP: 0.99 (ref 0.91–1.09)
LYMPHOCYTES # BLD AUTO: 1.09 10*3/MM3 (ref 0.7–3.1)
LYMPHOCYTES NFR BLD AUTO: 15.3 % (ref 19.6–45.3)
MCH RBC QN AUTO: 30.1 PG (ref 26.6–33)
MCHC RBC AUTO-ENTMCNC: 33.3 G/DL (ref 31.5–35.7)
MCV RBC AUTO: 90.4 FL (ref 79–97)
MONOCYTES # BLD AUTO: 0.76 10*3/MM3 (ref 0.1–0.9)
MONOCYTES NFR BLD AUTO: 10.7 % (ref 5–12)
NEUTROPHILS NFR BLD AUTO: 5.15 10*3/MM3 (ref 1.7–7)
NEUTROPHILS NFR BLD AUTO: 72.3 % (ref 42.7–76)
NRBC BLD AUTO-RTO: 0 /100 WBC (ref 0–0.2)
PLATELET # BLD AUTO: 247 10*3/MM3 (ref 140–450)
PMV BLD AUTO: 9.9 FL (ref 6–12)
POTASSIUM SERPL-SCNC: 4.1 MMOL/L (ref 3.5–5.2)
PROT SERPL-MCNC: 6.5 G/DL (ref 6–8.5)
PROTHROMBIN TIME: 13.2 SECONDS (ref 11.8–14.8)
RBC # BLD AUTO: 4.48 10*6/MM3 (ref 3.77–5.28)
SODIUM SERPL-SCNC: 133 MMOL/L (ref 136–145)
WBC NRBC COR # BLD AUTO: 7.12 10*3/MM3 (ref 3.4–10.8)

## 2024-01-19 PROCEDURE — G0378 HOSPITAL OBSERVATION PER HR: HCPCS

## 2024-01-19 PROCEDURE — 85025 COMPLETE CBC W/AUTO DIFF WBC: CPT

## 2024-01-19 PROCEDURE — 80053 COMPREHEN METABOLIC PANEL: CPT

## 2024-01-19 PROCEDURE — 99285 EMERGENCY DEPT VISIT HI MDM: CPT

## 2024-01-19 PROCEDURE — 72192 CT PELVIS W/O DYE: CPT

## 2024-01-19 PROCEDURE — 85610 PROTHROMBIN TIME: CPT

## 2024-01-19 PROCEDURE — 73552 X-RAY EXAM OF FEMUR 2/>: CPT

## 2024-01-19 PROCEDURE — 85730 THROMBOPLASTIN TIME PARTIAL: CPT

## 2024-01-19 PROCEDURE — 73590 X-RAY EXAM OF LOWER LEG: CPT

## 2024-01-19 RX ORDER — ACETAMINOPHEN 325 MG/1
650 TABLET ORAL EVERY 4 HOURS PRN
Status: DISCONTINUED | OUTPATIENT
Start: 2024-01-19 | End: 2024-01-26 | Stop reason: HOSPADM

## 2024-01-19 RX ORDER — NALOXONE HCL 0.4 MG/ML
0.4 VIAL (ML) INJECTION
Status: DISCONTINUED | OUTPATIENT
Start: 2024-01-19 | End: 2024-01-26 | Stop reason: HOSPADM

## 2024-01-19 RX ORDER — SODIUM CHLORIDE 9 MG/ML
40 INJECTION, SOLUTION INTRAVENOUS AS NEEDED
Status: DISCONTINUED | OUTPATIENT
Start: 2024-01-19 | End: 2024-01-26 | Stop reason: HOSPADM

## 2024-01-19 RX ORDER — BISACODYL 5 MG/1
5 TABLET, DELAYED RELEASE ORAL DAILY PRN
Status: DISCONTINUED | OUTPATIENT
Start: 2024-01-19 | End: 2024-01-23

## 2024-01-19 RX ORDER — HYDROMORPHONE HYDROCHLORIDE 1 MG/ML
0.5 INJECTION, SOLUTION INTRAMUSCULAR; INTRAVENOUS; SUBCUTANEOUS
Status: DISCONTINUED | OUTPATIENT
Start: 2024-01-19 | End: 2024-01-22

## 2024-01-19 RX ORDER — ONDANSETRON 2 MG/ML
4 INJECTION INTRAMUSCULAR; INTRAVENOUS ONCE
Status: DISCONTINUED | OUTPATIENT
Start: 2024-01-19 | End: 2024-01-26 | Stop reason: HOSPADM

## 2024-01-19 RX ORDER — CALCIUM CARBONATE 500(1250)
1000 TABLET ORAL DAILY
Status: DISCONTINUED | OUTPATIENT
Start: 2024-01-20 | End: 2024-01-26 | Stop reason: HOSPADM

## 2024-01-19 RX ORDER — SODIUM CHLORIDE 0.9 % (FLUSH) 0.9 %
10 SYRINGE (ML) INJECTION EVERY 12 HOURS SCHEDULED
Status: DISCONTINUED | OUTPATIENT
Start: 2024-01-19 | End: 2024-01-26 | Stop reason: HOSPADM

## 2024-01-19 RX ORDER — ONDANSETRON 2 MG/ML
4 INJECTION INTRAMUSCULAR; INTRAVENOUS EVERY 6 HOURS PRN
Status: DISCONTINUED | OUTPATIENT
Start: 2024-01-19 | End: 2024-01-26 | Stop reason: HOSPADM

## 2024-01-19 RX ORDER — HYDROCODONE BITARTRATE AND ACETAMINOPHEN 5; 325 MG/1; MG/1
1 TABLET ORAL EVERY 4 HOURS PRN
Status: DISCONTINUED | OUTPATIENT
Start: 2024-01-19 | End: 2024-01-22

## 2024-01-19 RX ORDER — FENTANYL CITRATE 50 UG/ML
50 INJECTION, SOLUTION INTRAMUSCULAR; INTRAVENOUS ONCE
Status: DISCONTINUED | OUTPATIENT
Start: 2024-01-19 | End: 2024-01-20 | Stop reason: HOSPADM

## 2024-01-19 RX ORDER — SODIUM CHLORIDE 0.9 % (FLUSH) 0.9 %
10 SYRINGE (ML) INJECTION AS NEEDED
Status: DISCONTINUED | OUTPATIENT
Start: 2024-01-19 | End: 2024-01-26 | Stop reason: HOSPADM

## 2024-01-19 RX ORDER — BISACODYL 10 MG
10 SUPPOSITORY, RECTAL RECTAL DAILY PRN
Status: DISCONTINUED | OUTPATIENT
Start: 2024-01-19 | End: 2024-01-23

## 2024-01-19 RX ORDER — ONDANSETRON 4 MG/1
4 TABLET, ORALLY DISINTEGRATING ORAL EVERY 6 HOURS PRN
Status: DISCONTINUED | OUTPATIENT
Start: 2024-01-19 | End: 2024-01-26 | Stop reason: HOSPADM

## 2024-01-19 RX ORDER — POLYETHYLENE GLYCOL 3350 17 G/17G
17 POWDER, FOR SOLUTION ORAL DAILY PRN
Status: DISCONTINUED | OUTPATIENT
Start: 2024-01-19 | End: 2024-01-23

## 2024-01-19 RX ORDER — AMOXICILLIN 250 MG
2 CAPSULE ORAL 2 TIMES DAILY
Status: DISCONTINUED | OUTPATIENT
Start: 2024-01-19 | End: 2024-01-23

## 2024-01-19 NOTE — ED PROVIDER NOTES
Subjective   History of Present Illness  Patient is a 89-year-old female that presents to the emergency department for a ground-level fall.  Patient has a history of dementia.  Daughter is present at bedside who reports she is her caretaker.  She states that last night patient turned around too quickly losing her balance causing patient to fall onto the carpet landing on the left hip.  She states that originally, patient was complaining of no pain and was able to pull herself up but upon ambulation patient was complaining of pelvic pain.  Patient has a bruise noted to the left hip and left proximal tib-fib area laterally.  Daughter states that patient did not hit her head or any loss of consciousness.  She states that she has not had any altered level of mental status or lethargy noted.  She also reports that patient has not complained of pain elsewhere other than left lower extremity pelvis.  She denies any anticoagulation use.  Daughter denies any recent sick exposure, fevers, body aches, or chills.  Denies any cough, or congestion.  Denies any complaints of chest pain, shortness of breath, abdominal pain, nausea, vomiting, constipation, or diarrhea.      Review of Systems   Constitutional:  Positive for activity change.   Musculoskeletal:  Positive for arthralgias, gait problem and myalgias.        Pain and bruising to the left lower extremity and pelvis.   All other systems reviewed and are negative.      Past Medical History:   Diagnosis Date    Breast cancer 1995    right     Hx of radiation therapy     Hypertension     Vitamin D deficiency        Allergies   Allergen Reactions    Penicillins Hives       Past Surgical History:   Procedure Laterality Date    APPENDECTOMY      BREAST BIOPSY      BREAST LUMPECTOMY Right 1995    BREAST LUMPECTOMY Right     HYSTERECTOMY         Family History   Problem Relation Age of Onset    Breast cancer Mother     No Known Problems Father     No Known Problems Sister     No  Known Problems Brother     No Known Problems Daughter     No Known Problems Son     No Known Problems Maternal Grandmother     No Known Problems Paternal Grandmother     No Known Problems Maternal Aunt     No Known Problems Paternal Aunt     No Known Problems Other     BRCA 1/2 Neg Hx     Colon cancer Neg Hx     Endometrial cancer Neg Hx     Ovarian cancer Neg Hx        Social History     Socioeconomic History    Marital status:    Tobacco Use    Smoking status: Never    Smokeless tobacco: Never   Vaping Use    Vaping Use: Never used   Substance and Sexual Activity    Alcohol use: No    Drug use: No    Sexual activity: Not Currently           Objective   Physical Exam  Vitals and nursing note reviewed.   Constitutional:       Appearance: Normal appearance.      Comments: Nontoxic appearing. In no acute distress.    HENT:      Head: Normocephalic and atraumatic.      Right Ear: External ear normal.      Left Ear: External ear normal.      Nose: Nose normal.      Mouth/Throat:      Mouth: Mucous membranes are moist.      Pharynx: Oropharynx is clear.   Eyes:      Extraocular Movements: Extraocular movements intact.      Conjunctiva/sclera: Conjunctivae normal.      Pupils: Pupils are equal, round, and reactive to light.   Cardiovascular:      Rate and Rhythm: Normal rate and regular rhythm.      Pulses: Normal pulses.      Heart sounds: Normal heart sounds.   Pulmonary:      Effort: Pulmonary effort is normal. No respiratory distress.      Breath sounds: Normal breath sounds. No wheezing.   Chest:      Chest wall: No tenderness.   Abdominal:      General: Abdomen is flat. Bowel sounds are normal. There is no distension.      Palpations: Abdomen is soft.      Tenderness: There is no abdominal tenderness. There is no right CVA tenderness, left CVA tenderness, guarding or rebound.   Musculoskeletal:         General: Normal range of motion.      Cervical back: Normal range of motion and neck supple.      Right  lower leg: No edema.      Left lower leg: No edema.      Comments: Bruising noted to the left hip and proximal left tib-fib.   Skin:     General: Skin is warm and dry.      Capillary Refill: Capillary refill takes less than 2 seconds.   Neurological:      Mental Status: She is alert. Mental status is at baseline. She is disoriented.      Comments: Patient disoriented at baseline.  History of dementia.   Psychiatric:         Cognition and Memory: Cognition is impaired. Memory is impaired.       CT Pelvis Without Contrast   Final Result   1. LEFT pubic symphysis and LEFT pubic ramus fractures.   2. Questionable nondisplaced LEFT sacral ala fracture.   3. No acetabulum or hip fracture.       This report was signed and finalized on 1/19/2024 6:18 PM by Dr. Johnathan Weldon MD.          XR Tibia Fibula 2 View Left   Final Result   1. No acute fracture.       This report was signed and finalized on 1/19/2024 5:40 PM by Dr. Johnathan Weldon MD.          XR Femur 2 View Left   Final Result   1. No LEFT femur fracture.       This report was signed and finalized on 1/19/2024 5:50 PM by Dr. Johnathan Weldon MD.             Labs Reviewed   COMPREHENSIVE METABOLIC PANEL - Abnormal; Notable for the following components:       Result Value    Sodium 133 (*)     Albumin 3.3 (*)     BUN/Creatinine Ratio 30.2 (*)     All other components within normal limits    Narrative:     GFR Normal >60  Chronic Kidney Disease <60  Kidney Failure <15    The GFR formula is only valid for adults with stable renal function between ages 18 and 70.   CBC WITH AUTO DIFFERENTIAL - Abnormal; Notable for the following components:    Lymphocyte % 15.3 (*)     All other components within normal limits   PROTIME-INR - Normal   APTT - Normal   CBC AND DIFFERENTIAL    Narrative:     The following orders were created for panel order CBC & Differential.  Procedure                               Abnormality         Status                     ---------                                -----------         ------                     CBC Auto Differential[[id number]]        Abnormal            Final result                 Please view results for these tests on the individual orders.        Procedures           ED Course  ED Course as of 01/19/24 1938 Fri Jan 19, 2024 1707 Patient last ate around 1430 [KF]   1849 Spoke with orthopedic surgeon, Dr. Lopes regarding fractures.  He states that patient can weight-bear as tolerated.  Nursing staff reports that they attempted to walk patient but due to pain patient was unable to ambulate. [KF]   1921 Dr. Tyler aragon at this time regarding possible admission [KF]   1930 Spoke with Dr. Milner who has accepted patient for admission at this time.  [KF]      ED Course User Index  [KF] Philipp Finn, APRN                                             Medical Decision Making  Corinne Rivera is a 89 y.o. female who presents to the ED for a ground-level fall.  Patient has a history of dementia.  Daughter is present at bedside who reports she is her caretaker.  She states that last night patient turned around too quickly losing her balance causing patient to fall onto the carpet landing on the left hip.  She states that originally, patient was complaining of no pain and was able to pull herself up but upon ambulation patient was complaining of pelvic pain.  Patient has a bruise noted to the left hip and left proximal tib-fib area laterally.  Daughter states that patient did not hit her head or any loss of consciousness.  She states that she has not had any altered level of mental status or lethargy noted.  She also reports that patient has not complained of pain elsewhere other than left lower extremity pelvis.  She denies any anticoagulation use.  Daughter denies any recent sick exposure, fevers, body aches, or chills.  Denies any cough, or congestion.  Denies any complaints of chest pain, shortness of breath, abdominal pain, nausea, vomiting,  constipation, or diarrhea.    Patient was non-toxic appearing on arrival. No acute distress was noted.  Vital signs stable.    Past medical history, surgical history, and medication regimen reviewed.     Patient's presentation raises suspicion for differentials including, but not limited to, pelvic fracture, hip fracture, dislocation.    Please refer to above section of note for lab and imaging results that were reviewed and interpreted by radiology as well as attending physician.     Patient was administered fentanyl and Zofran while in the ED.    Spoke with orthopedic surgeon on-call, Dr. Lopes who states that patient may weight-bear as tolerated but if she is having difficulty she can limit her weightbearing to toe-touch until discomfort allows her to advance.  Nursing staff attempted to ambulate patient but were unsuccessful due to pain.    Spoke with Dr. Her, partners with Dr. Scott, patients PCP who has assessed the patient for admission at this time.    Given findings described above, patient's presentation is likely consistent with left pubic symphysis and pubic ramus fractures due to a ground-level fall.     I reassessed the patient and discussed the findings of the work up so far with everyone in the room. I said that the next step in treatment would be admission to the hospital for further workup and care. I also said that there is always some diagnostic uncertainty in the ER, that symptoms may change, and new things may be found after being admitted. Dr. Milner assumed primary care of the patient and the patient was admitted to their service in stable condition.    Dragon disclaimer:  Parts of this note may be an electronic transcription/translation of spoken language to printed text using the Dragon dictation system.       Problems Addressed:  Ambulatory dysfunction: complicated acute illness or injury  Closed fracture of left pubis, unspecified portion of pubis, initial encounter: complicated  acute illness or injury  Ground-level fall: complicated acute illness or injury    Amount and/or Complexity of Data Reviewed  Labs: ordered.  Radiology: ordered.    Risk  Prescription drug management.  Decision regarding hospitalization.        Final diagnoses:   Ground-level fall   Closed fracture of left pubis, unspecified portion of pubis, initial encounter   Ambulatory dysfunction       ED Disposition  ED Disposition       ED Disposition   Decision to Admit    Condition   --    Comment   Level of Care: Med/Surg [1]   Diagnosis: Pelvic fracture [346760]   Admitting Physician: CLIVE VELÁZQUEZ [[id number]]   Attending Physician: CLIVE VELÁZQUEZ [[id number]]                 No follow-up provider specified.       Medication List      No changes were made to your prescriptions during this visit.            Philipp Finn, APRN  01/19/24 1938

## 2024-01-20 PROCEDURE — 97161 PT EVAL LOW COMPLEX 20 MIN: CPT | Performed by: PHYSICAL THERAPIST

## 2024-01-20 PROCEDURE — G0378 HOSPITAL OBSERVATION PER HR: HCPCS

## 2024-01-20 PROCEDURE — 97166 OT EVAL MOD COMPLEX 45 MIN: CPT

## 2024-01-20 RX ADMIN — DOCUSATE SODIUM 50 MG AND SENNOSIDES 8.6 MG 2 TABLET: 8.6; 5 TABLET, FILM COATED ORAL at 19:48

## 2024-01-20 RX ADMIN — Medication 10 ML: at 19:53

## 2024-01-20 NOTE — THERAPY EVALUATION
Patient Name: Corinne Rivera  : 1931    MRN: [id number]                              Today's Date: 2024       Admit Date: 2024    Visit Dx:     ICD-10-CM ICD-9-CM   1. Ground-level fall  W18.30XA E888.9   2. Closed fracture of left pubis, unspecified portion of pubis, initial encounter  S32.502A 808.2   3. Ambulatory dysfunction  R26.2 719.7   4. Impaired mobility [Z74.09]  Z74.09 799.89     Patient Active Problem List   Diagnosis    Asymptomatic postmenopausal status    Hyponatremia    Hypertension    Acute metabolic encephalopathy    Fever    Thrombocytopenia    Elevated liver enzymes    Pelvic fracture     Past Medical History:   Diagnosis Date    Breast cancer     right     Hx of radiation therapy     Hypertension     Vitamin D deficiency      Past Surgical History:   Procedure Laterality Date    APPENDECTOMY      BREAST BIOPSY      BREAST LUMPECTOMY Right 1995    BREAST LUMPECTOMY Right     HYSTERECTOMY        General Information       Row Name 24 0730          OT Time and Intention    Document Type evaluation  Presented after fall resulting in Closed fracture of left pubis. PMH: dementia, HTN  -     Mode of Treatment occupational therapy  -       Row Name 24 0730          General Information    Patient Profile Reviewed yes  -     Prior Level of Function independent:  Min A with bathing; I with dressing, toileting, and fxl ambulation  -     Existing Precautions/Restrictions fall  WBAT LLE  -     Barriers to Rehab cognitive status  -       Row Name 24 0730          Occupational Profile    Environmental Supports and Barriers (Occupational Profile) Walk in shower with built in seat and grab bar. Other AD/DME: [employer], rollator. Caregiver comes to check on Pt daily if daughter is not in town  -       Row Name 24 0730          Living Environment    People in Home alone  -       Row Name 24 0730          Home Main Entrance    Number of Stairs, Main  Entrance two  -       Row Name 01/20/24 0730          Stairs Within Home, Primary    Number of Stairs, Within Home, Primary --  flight of stairs; Pt has access to all needs on main level  -       Row Name 01/20/24 0730          Cognition    Orientation Status (Cognition) oriented to;person;disoriented to;place;situation;time  -       Row Name 01/20/24 0730          Safety Issues, Functional Mobility    Safety Issues Affecting Function (Mobility) ability to follow commands;at risk behavior observed;awareness of need for assistance;friction/shear risk;impulsivity;insight into deficits/self-awareness;judgment;problem-solving;safety precaution awareness;safety precautions follow-through/compliance;sequencing abilities  -     Impairments Affecting Function (Mobility) cognition;strength;pain  -     Cognitive Impairments, Mobility Safety/Performance attention;awareness, need for assistance;impulsivity;insight into deficits/self-awareness;judgment;problem-solving/reasoning;safety precaution awareness;safety precaution follow-through;sequencing abilities  -               User Key  (r) = Recorded By, (t) = Taken By, (c) = Cosigned By      Initials Name Provider Type     Nalini Barkley OTR/L Occupational Therapist                     Mobility/ADL's       Row Name 01/20/24 0730          Bed Mobility    Bed Mobility supine-sit  -     Supine-Sit Orient (Bed Mobility) moderate assist (50% patient effort);2 person assist;verbal cues;nonverbal cues (demo/gesture)  -     Assistive Device (Bed Mobility) bed rails;head of bed elevated;draw sheet  -       Row Name 01/20/24 0730          Transfers    Transfers sit-stand transfer  -       Row Name 01/20/24 0730          Sit-Stand Transfer    Sit-Stand Orient (Transfers) minimum assist (75% patient effort);2 person assist;verbal cues;nonverbal cues (demo/gesture)  -     Assistive Device (Sit-Stand Transfers) other (see comments)  HHA x2  -       Row  Name 01/20/24 0730          Functional Mobility    Patient was able to Ambulate yes  -       Row Name 01/20/24 0730          Activities of Daily Living    BADL Assessment/Intervention upper body dressing;lower body dressing;toileting  -       Row Name 01/20/24 0730          Mobility    Extremity Weight-bearing Status left lower extremity  -LS     Left Lower Extremity (Weight-bearing Status) weight-bearing as tolerated (WBAT)  -       Row Name 01/20/24 0730          Upper Body Dressing Assessment/Training    [place] Level (Upper Body Dressing) upper body dressing skills;minimum assist (75% patient effort)  -LS     Position (Upper Body Dressing) edge of bed sitting  -       Row Name 01/20/24 0730          Lower Body Dressing Assessment/Training    [place] Level (Lower Body Dressing) lower body dressing skills;maximum assist (25% patient effort)  -LS     Position (Lower Body Dressing) supported standing;supported sitting  -       Row Name 01/20/24 0730          Toileting Assessment/Training    [place] Level (Toileting) toileting skills;maximum assist (25% patient effort)  -LS     Position (Toileting) supported sitting;supported standing  -               User Key  (r) = Recorded By, (t) = Taken By, (c) = Cosigned By      Initials Name Provider Type     Nalini Barkley, OTR/L Occupational Therapist                   Obj/Interventions       Row Name 01/20/24 0730          Range of Motion Comprehensive    General Range of Motion bilateral upper extremity ROM WFL  -       Row Name 01/20/24 0730          Strength Comprehensive (MMT)    Comment, General Manual Muscle Testing (MMT) Assessment Strength testing deferred due to impaired cognition/limited ability to follow commands  -       Row Name 01/20/24 0730          Balance    Balance Assessment sitting static balance;sitting dynamic balance;standing static balance;standing dynamic balance  -     Static Sitting Balance contact guard  -      Dynamic Sitting Balance contact guard;minimal assist  -LS     Position, Sitting Balance supported;sitting edge of bed  -LS     Static Standing Balance minimal assist  -LS     Dynamic Standing Balance minimal assist  -LS     Position/Device Used, Standing Balance supported;other (see comments)  HHA x2  -LS               User Key  (r) = Recorded By, (t) = Taken By, (c) = Cosigned By      Initials Name Provider Type    LS Nalini Barkley, OTR/L Occupational Therapist                   Goals/Plan       Row Name 01/20/24 0730          Transfer Goal 1 (OT)    Activity/Assistive Device (Transfer Goal 1, OT) commode, bedside without drop arms  -LS     [place] Level/Cues Needed (Transfer Goal 1, OT) contact guard required  -LS     Time Frame (Transfer Goal 1, OT) long term goal (LTG);10 days  -LS     Progress/Outcome (Transfer Goal 1, OT) new goal  -LS       Row Name 01/20/24 0730          Dressing Goal 1 (OT)    Activity/Device (Dressing Goal 1, OT) lower body dressing  -LS     [place]/Cues Needed (Dressing Goal 1, OT) moderate assist (50-74% patient effort)  -LS     Time Frame (Dressing Goal 1, OT) long term goal (LTG);10 days  -LS     Progress/Outcome (Dressing Goal 1, OT) new goal  -LS       Row Name 01/20/24 0730          Toileting Goal 1 (OT)    Activity/Device (Toileting Goal 1, OT) toileting skills, all  -LS     [place] Level/Cues Needed (Toileting Goal 1, OT) moderate assist (50-74% patient effort)  -LS     Time Frame (Toileting Goal 1, OT) long term goal (LTG);10 days  -LS     Progress/Outcome (Toileting Goal 1, OT) new goal  -LS       Row Name 01/20/24 0730          Therapy Assessment/Plan (OT)    Planned Therapy Interventions (OT) activity tolerance training;functional balance retraining;occupation/activity based interventions;ROM/therapeutic exercise;strengthening exercise;transfer/mobility retraining;patient/caregiver education/training;adaptive equipment training;BADL retraining  -LS                User Key  (r) = Recorded By, (t) = Taken By, (c) = Cosigned By      Initials Name Provider Type     Nalini Barkley, OTR/L Occupational Therapist                   Clinical Impression       Row Name 01/20/24 0730          Pain Assessment    Pretreatment Pain Rating 0/10 - no pain  -       Row Name 01/20/24 0730          Pain Scale: FACES Pre/Post-Treatment    Posttreatment Pain Rating 4-->hurts little more  -       Row Name 01/20/24 0730          Plan of Care Review    Plan of Care Reviewed With patient;daughter  -     Progress no change  -     Outcome Evaluation OT eval completed. Pt in fowlers upon therapist arrival; A&O to person only; No c/o pain at rest; Pt's daughter also present. Pt's daughter reports that the Pt was performing most BADLs with supervision-Mod I at PLOF. Today, Pt performed supine>sit utilizing bedrail with HOB elevated with Mod A x2 and constant verbal/visual/tactile cues for sequencing and body mechanics. Pt dependent for donning B socks. Pt performed sit>stand and took a few steps toward chair requiring Min A with HHA x2. Skilled OT intervention indicated in order to address deficits in fxl mobility, fxl activity tolerance, balance, strength, and use of adaptive techniques/equipment during performance of BADLs. Recommend SNF at discharge.  -       Row Name 01/20/24 0730          Therapy Assessment/Plan (OT)    Rehab Potential (OT) good, to achieve stated therapy goals  -     Criteria for Skilled Therapeutic Interventions Met (OT) yes;skilled treatment is necessary  -     Therapy Frequency (OT) 5 times/wk  -       Row Name 01/20/24 0730          Therapy Plan Review/Discharge Plan (OT)    Anticipated Discharge Disposition (OT) skilled nursing facility  -       Row Name 01/20/24 0730          Positioning and Restraints    Pre-Treatment Position in bed  -LS     Post Treatment Position chair  -LS     In Chair reclined;call light within reach;encouraged to call for assist;exit  alarm on;legs elevated;with family/caregiver;notified nsg  -LS               User Key  (r) = Recorded By, (t) = Taken By, (c) = Cosigned By      Initials Name Provider Type    Nalini Spears OTR/L Occupational Therapist                   Outcome Measures       Row Name 01/20/24 0730          How much help from another is currently needed...    Putting on and taking off regular lower body clothing? 2  -LS     Bathing (including washing, rinsing, and drying) 2  -LS     Toileting (which includes using toilet bed pan or urinal) 2  -LS     Putting on and taking off regular upper body clothing 3  -LS     Taking care of personal grooming (such as brushing teeth) 3  -LS     Eating meals 4  -LS     AM-PAC 6 Clicks Score (OT) 16  -LS       Row Name 01/20/24 0942          How much help from another person do you currently need...    Turning from your back to your side while in flat bed without using bedrails? 2  -MS     Moving from lying on back to sitting on the side of a flat bed without bedrails? 2  -MS     Moving to and from a bed to a chair (including a wheelchair)? 2  -MS     Standing up from a chair using your arms (e.g., wheelchair, bedside chair)? 2  -MS     Climbing 3-5 steps with a railing? 1  -MS     To walk in hospital room? 1  -MS     AM-PAC 6 Clicks Score (PT) 10  -MS     Highest Level of Mobility Goal 4 --> Transfer to chair/commode  -MS       Row Name 01/20/24 0942 01/20/24 0730       Functional Assessment    Outcome Measure Options AM-PAC 6 Clicks Basic Mobility (PT)  -MS AM-PAC 6 Clicks Daily Activity (OT)  -              User Key  (r) = Recorded By, (t) = Taken By, (c) = Cosigned By      Initials Name Provider Type    Shivani Hines R, PT, DPT, NCS Physical Therapist    Nalini Spears OTR/L Occupational Therapist                    Occupational Therapy Education       Title: PT OT SLP Therapies (In Progress)       Topic: Occupational Therapy (In Progress)       Point: ADL training (Done)        Description:   Instruct learner(s) on proper safety adaptation and remediation techniques during self care or transfers.   Instruct in proper use of assistive devices.                  Learning Progress Summary             Patient Acceptance, E, VU,NR by  at 1/20/2024 1103                         Point: Home exercise program (Not Started)       Description:   Instruct learner(s) on appropriate technique for monitoring, assisting and/or progressing therapeutic exercises/activities.                  Learner Progress:  Not documented in this visit.              Point: Precautions (Done)       Description:   Instruct learner(s) on prescribed precautions during self-care and functional transfers.                  Learning Progress Summary             Patient Acceptance, E, VU,NR by  at 1/20/2024 1103                         Point: Body mechanics (Done)       Description:   Instruct learner(s) on proper positioning and spine alignment during self-care, functional mobility activities and/or exercises.                  Learning Progress Summary             Patient Acceptance, E, VU,NR by  at 1/20/2024 1103                                         User Key       Initials Effective Dates Name Provider Type Discipline     06/20/22 -  Nalini Barkley, OTR/L Occupational Therapist OT                  OT Recommendation and Plan  Planned Therapy Interventions (OT): activity tolerance training, functional balance retraining, occupation/activity based interventions, ROM/therapeutic exercise, strengthening exercise, transfer/mobility retraining, patient/caregiver education/training, adaptive equipment training, BADL retraining  Therapy Frequency (OT): 5 times/wk  Plan of Care Review  Plan of Care Reviewed With: patient, daughter  Progress: no change  Outcome Evaluation: OT eval completed. Pt in fowlers upon therapist arrival; A&O to person only; No c/o pain at rest; Pt's daughter also present. Pt's daughter reports that the Pt  was performing most BADLs with supervision-Mod I at [facility]. Today, Pt performed supine>sit utilizing bedrail with HOB elevated with Mod A x2 and constant verbal/visual/tactile cues for sequencing and body mechanics. Pt dependent for donning B socks. Pt performed sit>stand and took a few steps toward chair requiring Min A with HHA x2. Skilled OT intervention indicated in order to address deficits in fxl mobility, fxl activity tolerance, balance, strength, and use of adaptive techniques/equipment during performance of BADLs. Recommend SNF at discharge.     Time Calculation:         Time Calculation- OT       Row Name 01/20/24 0730             Time Calculation- OT    OT Start Time 0940  +15 minutes chart review  -      OT Stop Time 1006  -      OT Time Calculation (min) 26 min  -      OT Non-Billable Time (min) 41 min  -      OT Received On 01/20/24  -      OT Goal Re-Cert Due Date 01/30/24  -                User Key  (r) = Recorded By, (t) = Taken By, (c) = Cosigned By      Initials Name Provider Type     Nalini Barkley OTR/L Occupational Therapist                  Therapy Charges for Today       Code Description Service Date Service Provider Modifiers Qty    [id number] HC OT EVAL MOD COMPLEXITY 3 1/20/2024 Nalini Barkley OTR/L GO 1                 KIT Carrillo/L  1/20/2024

## 2024-01-20 NOTE — THERAPY EVALUATION
Patient Name: Corinne Rivera  : 1931    MRN: [id number]                              Today's Date: 2024       Admit Date: 2024    Visit Dx:     ICD-10-CM ICD-9-CM   1. Ground-level fall  W18.30XA E888.9   2. Closed fracture of left pubis, unspecified portion of pubis, initial encounter  S32.502A 808.2   3. Ambulatory dysfunction  R26.2 719.7   4. Impaired mobility [Z74.09]  Z74.09 799.89     Patient Active Problem List   Diagnosis    Asymptomatic postmenopausal status    Hyponatremia    Hypertension    Acute metabolic encephalopathy    Fever    Thrombocytopenia    Elevated liver enzymes    Pelvic fracture     Past Medical History:   Diagnosis Date    Breast cancer     right     Hx of radiation therapy     Hypertension     Vitamin D deficiency      Past Surgical History:   Procedure Laterality Date    APPENDECTOMY      BREAST BIOPSY      BREAST LUMPECTOMY Right 1995    BREAST LUMPECTOMY Right     HYSTERECTOMY        General Information       Row Name 24 0942          Physical Therapy Time and Intention    Document Type evaluation  s/p fall at hoome, dementia, pelvic pain, L pubic symphysis and L pubic ramus fx  -MS     Mode of Treatment physical therapy;co-treatment  -MS       Row Name 24 0942          General Information    Patient Profile Reviewed yes  -MS     Existing Precautions/Restrictions fall  WBAT L LE  -MS       Row Name 24 0942          Living Environment    People in Home alone  daughter comes to stay with her to long periods of time, otherwise someone checks on her daily  -MS       Row Name 24 0942          Home Main Entrance    Number of Stairs, Main Entrance two  -MS     Stair Railings, Main Entrance none  -MS       Row Name 24 0942          Stairs Within Home, Primary    Number of Stairs, Within Home, Primary none  -MS       Row Name 24 0942          Cognition    Orientation Status (Cognition) oriented to;person;disoriented  to;place;situation;time  -MS       Row Name 01/20/24 0942          Safety Issues, Functional Mobility    Safety Issues Affecting Function (Mobility) ability to follow commands;at risk behavior observed;awareness of need for assistance;impulsivity  -MS     Impairments Affecting Function (Mobility) cognition  -MS     Cognitive Impairments, Mobility Safety/Performance attention;awareness, need for assistance;impulsivity;insight into deficits/self-awareness;judgment;problem-solving/reasoning;safety precaution awareness;safety precaution follow-through;sequencing abilities  -MS               User Key  (r) = Recorded By, (t) = Taken By, (c) = Cosigned By      Initials Name Provider Type    MS Tom Shivani R, PT, DPT, NCS Physical Therapist                   Mobility       Row Name 01/20/24 0942          Bed Mobility    Bed Mobility supine-sit  -MS     Supine-Sit [place] (Bed Mobility) moderate assist (50% patient effort);2 person assist;verbal cues;nonverbal cues (demo/gesture)  -MS     Assistive Device (Bed Mobility) head of bed elevated;bed rails  -MS     Comment, (Bed Mobility) repeated multiple attempts at directions  -MS       Row Name 01/20/24 0942          Sit-Stand Transfer    Sit-Stand [place] (Transfers) minimum assist (75% patient effort);2 person assist;verbal cues;nonverbal cues (demo/gesture)  -MS     Assistive Device (Sit-Stand Transfers) --  HHA x2  -MS       Row Name 01/20/24 0942          Gait/Stairs (Locomotion)    [place] Level (Gait) minimum assist (75% patient effort);verbal cues;nonverbal cues (demo/gesture);2 person assist  -MS     Assistive Device (Gait) --  HHA x2  -MS     Distance in Feet (Gait) 3 ft bed to chair with antalgic gait pattern  on L  -MS       Row Name 01/20/24 0942          Mobility    Extremity Weight-bearing Status left lower extremity  -MS     Left Lower Extremity (Weight-bearing Status) weight-bearing as tolerated (WBAT)  -MS               User Key  (r) =  Recorded By, (t) = Taken By, (c) = Cosigned By      Initials Name Provider Type    MS Shivani Santos, PT, DPT, NCS Physical Therapist                   Obj/Interventions       Row Name 01/20/24 0942          Range of Motion Comprehensive    General Range of Motion bilateral upper extremity ROM WFL  -MS     Comment, General Range of Motion B LE painful to movement in hips. B knee and ankle AROM WFL  -MS       Row Name 01/20/24 0942          Strength Comprehensive (MMT)    Comment, General Manual Muscle Testing (MMT) Assessment B LE defered due to cognition and pain  -MS       Row Name 01/20/24 0942          Balance    Balance Assessment sitting static balance;sitting dynamic balance;standing static balance;standing dynamic balance  -MS     Static Sitting Balance contact guard  -MS     Dynamic Sitting Balance contact guard;minimal assist  posterior lean  -MS     Position, Sitting Balance supported;sitting edge of bed  -MS     Static Standing Balance minimal assist  -MS     Dynamic Standing Balance minimal assist  -MS     Position/Device Used, Standing Balance supported  HHA x2  -MS               User Key  (r) = Recorded By, (t) = Taken By, (c) = Cosigned By      Initials Name Provider Type    MS SantosShivani, PT, DPT, NCS Physical Therapist                   Goals/Plan       Row Name 01/20/24 0942          Bed Mobility Goal 1 (PT)    Activity/Assistive Device (Bed Mobility Goal 1, PT) bed mobility activities, all  -MS     [place] Level/Cues Needed (Bed Mobility Goal 1, PT) contact guard required;tactile cues required;verbal cues required  -MS     Time Frame (Bed Mobility Goal 1, PT) long term goal (LTG);by discharge  -MS     Progress/Outcomes (Bed Mobility Goal 1, PT) new goal  -MS       Row Name 01/20/24 0942          Transfer Goal 1 (PT)    Activity/Assistive Device (Transfer Goal 1, PT) sit-to-stand/stand-to-sit;bed-to-chair/chair-to-bed  -MS     [place] Level/Cues Needed (Transfer Goal 1, PT)  contact guard required  -MS     Time Frame (Transfer Goal 1, PT) long term goal (LTG);by discharge  -MS     Progress/Outcome (Transfer Goal 1, PT) new goal  -MS       Row Name 01/20/24 0942          Gait Training Goal 1 (PT)    Activity/Assistive Device (Gait Training Goal 1, PT) gait (walking locomotion);decrease fall risk;diminish gait deviation;increase endurance/gait distance;improve balance and speed  -MS     [place] Level (Gait Training Goal 1, PT) contact guard required  -MS     Distance (Gait Training Goal 1, PT) 50ft  -MS     Time Frame (Gait Training Goal 1, PT) long term goal (LTG);by discharge  -MS     Progress/Outcome (Gait Training Goal 1, PT) new goal  -MS       Row Name 01/20/24 0942          Therapy Assessment/Plan (PT)    Planned Therapy Interventions (PT) balance training;bed mobility training;gait training;patient/family education;transfer training;strengthening;ROM (range of motion)  -MS               User Key  (r) = Recorded By, (t) = Taken By, (c) = Cosigned By      Initials Name Provider Type    Shivani Hines, PT, DPT, NCS Physical Therapist                   Clinical Impression       Row Name 01/20/24 0942          Pain    Pretreatment Pain Rating 0/10 - no pain  -MS     Additional Documentation Pain Scale: FACES Pre/Post-Treatment (Group)  -MS       Row Name 01/20/24 0942          Pain Scale: FACES Pre/Post-Treatment    Posttreatment Pain Rating 4-->hurts little more  -MS     Pre/Posttreatment Pain Comment pain with WB during gait  -MS       Row Name 01/20/24 0942          Plan of Care Review    Plan of Care Reviewed With patient;daughter  -MS     Progress no change  -MS     Outcome Evaluation The patient presents alert and oriented to self only with daughter present in the room. The patient has difficulty with following directions given to her, but she performs tasks automatically. She required assist to reach EOB due to pain. Once EOB she was able to stand with assist and take  a few steps to the chair though it was painful to weight bear through her LEs. She will benefit from continued PT to work on increasing her activity as tolerated. Recommend discharge to SNF.  -MS       Row Name 01/20/24 0942          Therapy Assessment/Plan (PT)    Patient/Family Therapy Goals Statement (PT) per daughter, to get rehab prior to returning home  -MS     Rehab Potential (PT) good, to achieve stated therapy goals  -MS     Criteria for Skilled Interventions Met (PT) yes;meets criteria;skilled treatment is necessary  -MS     Therapy Frequency (PT) 2 times/day  -MS     Predicted Duration of Therapy Intervention (PT) until discharge  -MS       Row Name 01/20/24 0942          Positioning and Restraints    Post Treatment Position chair  -MS     In Chair reclined;call light within reach;encouraged to call for assist;with family/caregiver;exit alarm on  -MS               User Key  (r) = Recorded By, (t) = Taken By, (c) = Cosigned By      Initials Name Provider Type    Shivani Hines, PT, DPT, NCS Physical Therapist                   Outcome Measures       Row Name 01/20/24 0942          How much help from another person do you currently need...    Turning from your back to your side while in flat bed without using bedrails? 2  -MS     Moving from lying on back to sitting on the side of a flat bed without bedrails? 2  -MS     Moving to and from a bed to a chair (including a wheelchair)? 2  -MS     Standing up from a chair using your arms (e.g., wheelchair, bedside chair)? 2  -MS     Climbing 3-5 steps with a railing? 1  -MS     To walk in hospital room? 1  -MS     AM-PAC 6 Clicks Score (PT) 10  -MS     Highest Level of Mobility Goal 4 --> Transfer to chair/commode  -MS       Row Name 01/20/24 0942          Functional Assessment    Outcome Measure Options AM-PAC 6 Clicks Basic Mobility (PT)  -MS               User Key  (r) = Recorded By, (t) = Taken By, (c) = Cosigned By      Initials Name Provider Type    MS  Shivani Santos, PT, DPT, NCS Physical Therapist                                 Physical Therapy Education       Title: PT OT SLP Therapies (In Progress)       Topic: Physical Therapy (In Progress)       Point: Mobility training (In Progress)       Learning Progress Summary             Patient Acceptance, E, NL by MS at 1/20/2024 0945    Comment: role of PT in her care                         Point: Home exercise program (Not Started)       Learner Progress:  Not documented in this visit.              Point: Body mechanics (Not Started)       Learner Progress:  Not documented in this visit.              Point: Precautions (In Progress)       Learning Progress Summary             Patient Acceptance, E, NL by MS at 1/20/2024 0945    Comment: role of PT in her care                                         User Key       Initials Effective Dates Name Provider Type Discipline    MS 07/11/23 -  Shivani Santos, PT, DPT, NCS Physical Therapist PT                  PT Recommendation and Plan  Planned Therapy Interventions (PT): balance training, bed mobility training, gait training, patient/family education, transfer training, strengthening, ROM (range of motion)  Plan of Care Reviewed With: patient, daughter  Progress: no change  Outcome Evaluation: The patient presents alert and oriented to self only with daughter present in the room. The patient has difficulty with following directions given to her, but she performs tasks automatically. She required assist to reach EOB due to pain. Once EOB she was able to stand with assist and take a few steps to the chair though it was painful to weight bear through her LEs. She will benefit from continued PT to work on increasing her activity as tolerated. Recommend discharge to SNF.     Time Calculation:         PT Charges       Row Name 01/20/24 0942             Time Calculation    Start Time 0940  10 min chart review  -MS      Stop Time 1008  -MS      Time Calculation (min) 28 min   -MS      PT Received On 01/20/24  -MS      PT Goal Re-Cert Due Date 01/30/24  -MS         Untimed Charges    PT Eval/Re-eval Minutes 38  -MS         Total Minutes    Untimed Charges Total Minutes 38  -MS       Total Minutes 38  -MS                User Key  (r) = Recorded By, (t) = Taken By, (c) = Cosigned By      Initials Name Provider Type    Shivani Hines, PT, DPT, NCS Physical Therapist                      PT G-Codes  Outcome Measure Options: AM-PAC 6 Clicks Basic Mobility (PT)  AM-PAC 6 Clicks Score (PT): 10  PT Discharge Summary  Anticipated Discharge Disposition (PT): skilled nursing facility    Shivani Santos, PT, DPT, NCS  1/20/2024

## 2024-01-20 NOTE — PLAN OF CARE
Goal Outcome Evaluation:  Plan of Care Reviewed With: patient, daughter        Progress: no change  Outcome Evaluation: The patient presents alert and oriented to self only with daughter present in the room. The patient has difficulty with following directions given to her, but she performs tasks automatically. She required assist to reach EOB due to pain. Once EOB she was able to stand with assist and take a few steps to the chair though it was painful to weight bear through her LEs. She will benefit from continued PT to work on increasing her activity as tolerated. Recommend discharge to SNF.      Anticipated Discharge Disposition (PT): skilled nursing facility

## 2024-01-20 NOTE — PLAN OF CARE
Goal Outcome Evaluation:  Plan of Care Reviewed With: patient, daughter        Progress: no change  Outcome Evaluation: Pt is alert, oriented to self only. VSS. RA. Voiding via purewick. Tolerating prescribed diet. No c/o pain. Daughter at bedside. Call light in reach. Safety maintained.

## 2024-01-20 NOTE — PLAN OF CARE
Goal Outcome Evaluation:  Plan of Care Reviewed With: patient, daughter        Progress: no change  Outcome Evaluation: OT eval completed. Pt in fowlers upon therapist arrival; A&O to person only; No c/o pain at rest; Pt's daughter also present. Pt's daughter reports that the Pt was performing most BADLs with supervision-Mod I at PLOF. Today, Pt performed supine>sit utilizing bedrail with HOB elevated with Mod A x2 and constant verbal/visual/tactile cues for sequencing and body mechanics. Pt dependent for donning B socks. Pt performed sit>stand and took a few steps toward chair requiring Min A with HHA x2. Skilled OT intervention indicated in order to address deficits in fxl mobility, fxl activity tolerance, balance, strength, and use of adaptive techniques/equipment during performance of BADLs. Recommend SNF at discharge.      Anticipated Discharge Disposition (OT): skilled nursing facility

## 2024-01-20 NOTE — H&P
Patient Care Team:  Gasper Scott MD as PCP - General  Gasper Scott MD as PCP - Family Medicine    Chief complaint pain in hip    Subjective     Patient is a 89 y.o. female presents with pain in hip 1 day after a fall at home. Onset of symptoms was abrupt starting 2 days ago.  Symptoms are associated with pain with ambulation.  Symptoms are aggravated by weightbearing.   Symptoms improve with nothing at home prior to arrival. Severity moderate to severe.  Context of pain related to fall at home.  Patient's daughter who was in the room with her witnessed the fall.  States she turned quickly removing something from the couch and felt on the left side.  Had pain moderately but was comfortable while seated in her recliner.  Did not wish to go to the ER at that time so was watched home overnight.  Following morning he had continued pain at which time daughter convinced her to go to the emergency room for x-rays.  [facility] pain more on the left side of hip.  Patient's daughter also mentions that she does get anxious at night and did so overnight or hospitalized.  Most of history gleaned from daughters information.    Review of Systems   Pertinent items are noted in HPI, all other systems reviewed and negative    History  Past Medical History:   Diagnosis Date    Breast cancer 1995    right     Hx of radiation therapy     Hypertension     Vitamin D deficiency      Past Surgical History:   Procedure Laterality Date    APPENDECTOMY      BREAST BIOPSY      BREAST LUMPECTOMY Right 1995    BREAST LUMPECTOMY Right     HYSTERECTOMY       Family History   Problem Relation Age of Onset    Breast cancer Mother     No Known Problems Father     No Known Problems Sister     No Known Problems Brother     No Known Problems Daughter     No Known Problems Son     No Known Problems Maternal Grandmother     No Known Problems Paternal Grandmother     No Known Problems Maternal Aunt     No Known Problems Paternal Aunt      No Known Problems Other     BRCA 1/2 Neg Hx     Colon cancer Neg Hx     Endometrial cancer Neg Hx     Ovarian cancer Neg Hx      Social History     Tobacco Use    Smoking status: Never    Smokeless tobacco: Never   Vaping Use    Vaping Use: Never used   Substance Use Topics    Alcohol use: No    Drug use: No     E-cigarette/Vaping    E-cigarette/Vaping Use Never User      E-cigarette/Vaping Substances     Medications Prior to Admission   Medication Sig Dispense Refill Last Dose    Calcium Carbonate 1500 (600 Ca) MG tablet    1/18/2024    vitamin D (ERGOCALCIFEROL) 67389 UNITS capsule capsule Take 1 capsule by mouth Every 14 (Fourteen) Days.   Past Week    lisinopril (PRINIVIL,ZESTRIL) 2.5 MG tablet Take 2.5 mg by mouth Daily. (Patient not taking: Reported on 1/19/2024)   Not Taking     Allergies:  Penicillins    Objective     Vital Signs  Temp:  [97.5 °F (36.4 °C)-98 °F (36.7 °C)] 97.5 °F (36.4 °C)  Heart Rate:  [] 99  Resp:  [16-18] 18  BP: (136-159)/(88-95) 136/88    Physical Exam:    General Appearance: alert, appears stated age, cooperative, and seated in recliner  Head: normocephalic, without obvious abnormality and atraumatic  Eyes: lids and lashes normal, conjunctivae and sclerae normal, and no icterus  Neck: supple and trachea midline  Lungs: clear to auscultation, respirations regular, respirations even, and respirations unlabored  Heart: regular rhythm & normal rate and normal S1, S2  Abdomen: normal bowel sounds and soft non-tender  Extremities: moves extremities well, no edema, no cyanosis, and no redness  Skin: turgor normal  Neurologic: Mental Status alertness awake    Results Review:    I reviewed the patient's new clinical results.    Assessment & Plan       Pelvic fracture    Hyponatremia    Hypertension      Mechanical fall at home resulting in left pubic rami and symphysis fracture as well as questionable left sacral alar fracture.  Generally speaking these are nonoperative stable  fractures that require healing with weightbearing as tolerated.  Will ask physical therapy to evaluate and see how she does as far as weightbearing.  Also social service consult per patient's daughter help with disposition planning.  She is asking for something as needed for anxiousness, particularly at night.  Will try something as nonbenzodiazepine at a very low dose to see if that gives her any relief.  Disposition plan to be determined    I discussed the patients findings and my recommendations with patient and family.     Brando Milner MD  01/20/24  11:41 CST    Time:  In excess of 40 minutes

## 2024-01-20 NOTE — CASE MANAGEMENT/SOCIAL WORK
Discharge Planning Assessment  [facility]     Patient Name: Corinne Rivera  MRN: [id number]  Today's Date: 1/20/2024    Admit Date: 1/19/2024        Discharge Needs Assessment       Row Name 01/20/24 1543       Living Environment    People in Home alone    Current Living Arrangements home    Potentially Unsafe Housing Conditions none    Primary Care Provided by self    Provides Primary Care For no one    Family Caregiver if Needed child(fe), adult    Quality of Family Relationships helpful;involved;supportive    Able to Return to Prior Arrangements no       Resource/Environmental Concerns    Resource/Environmental Concerns home accessibility    Home Accessibility Concerns stairs to enter home    Transportation Concerns none       Transition Planning    Patient/Family Anticipates Transition to inpatient rehabilitation facility    Patient/Family Anticipated Services at Transition rehabilitation services;skilled nursing    Transportation Anticipated health plan transportation;family or friend will provide       Discharge Needs Assessment    Readmission Within the Last 30 Days no previous admission in last 30 days    Equipment Currently Used at Home walker, rolling    Concerns to be Addressed denies needs/concerns at this time    Anticipated Changes Related to Illness inability to care for self    Equipment Needed After Discharge none    Outpatient/Agency/Support Group Needs skilled nursing facility    Discharge Facility/Level of Care Needs nursing facility, skilled    Discharge Coordination/Progress PT resides at home alone and has a daughter nearby who assist as needed. PT will need rehab prior to returning home. PT and daughter have been provided a list of facilities and they would like to be listed with [facility]. PT is currently in Observation status and will need to meet inpatient criteria and have status changed before Medicare will cover rehab stay. [facility] has discussed this with  and it is being  reviewed. SW will await status change and will list with [facility]. There will not be anyone available to review referral at SNF until Monday. SW will follow and assist as needed.                   Discharge Plan    No documentation.                 Continued Care and Services - Admitted Since 1/19/2024    Coordination has not been started for this encounter.          Demographic Summary    No documentation.                  Functional Status    No documentation.                  Psychosocial    No documentation.                  Abuse/Neglect    No documentation.                  Legal    No documentation.                  Substance Abuse    No documentation.                  Patient Forms    No documentation.                     MALVIN Liang

## 2024-01-20 NOTE — PLAN OF CARE
Goal Outcome Evaluation:   VSS. Alert x 2. Weight bearing as tolerated. Call light in reach. No complaints at this time.

## 2024-01-21 PROCEDURE — 97530 THERAPEUTIC ACTIVITIES: CPT

## 2024-01-21 PROCEDURE — G0378 HOSPITAL OBSERVATION PER HR: HCPCS

## 2024-01-21 PROCEDURE — 97116 GAIT TRAINING THERAPY: CPT

## 2024-01-21 PROCEDURE — 97110 THERAPEUTIC EXERCISES: CPT

## 2024-01-21 RX ADMIN — CALCIUM 1000 MG: 500 TABLET ORAL at 09:05

## 2024-01-21 RX ADMIN — Medication 10 ML: at 20:43

## 2024-01-21 RX ADMIN — DOCUSATE SODIUM 50 MG AND SENNOSIDES 8.6 MG 2 TABLET: 8.6; 5 TABLET, FILM COATED ORAL at 20:43

## 2024-01-21 RX ADMIN — Medication 10 ML: at 09:05

## 2024-01-21 NOTE — PLAN OF CARE
Goal Outcome Evaluation:  Plan of Care Reviewed With: patient        Progress: improving  Outcome Evaluation: Pt alert to person, has history of dementia. Denies pain when sitting still. Pt sat up  in chair. Bed alarm in place. Continue to monitor.

## 2024-01-21 NOTE — PROGRESS NOTES
Family Medicine Progress Note    Patient:  Corinne Rivera  YOB: 1931    MRN: [id number]     Acct: [id number]     Admit date: 1/19/2024    Patient Seen, Chart, Consults notes, Labs, Radiology studies reviewed.    Subjective: Day 0 of stay with fall at home resulting in pubic rami fracture and most recent (in last 24 hours) has had no new complaints.  Continued pain with weightbearing.  Has been able to work with physical therapy on limited status getting from bed to chair.  Daughter in the room voices some other recent concerns not necessarily pertinent to admission but is noticed occasional difficulty swallowing particularly in the large pills but not so much with food.  Also has seen decline in her memory.    Past, Family, Social History unchanged from admission.    Diet: Diet: Cardiac Diets, Diabetic Diets, Renal Diets; Healthy Heart (2-3 Na+); Consistent Carbohydrate; Low Sodium (2-3g), Low Potassium, Low Phosphorus; Texture: Regular Texture (IDDSI 7); Fluid Consistency: Thin (IDDSI 0)    Medications:  Scheduled Meds:calcium carbonate (oyster shell), 1,000 mg, Oral, Daily  ondansetron, 4 mg, Intravenous, Once  senna-docusate sodium, 2 tablet, Oral, BID  sodium chloride, 10 mL, Intravenous, Q12H      Continuous Infusions:   PRN Meds:  acetaminophen    senna-docusate sodium **AND** polyethylene glycol **AND** bisacodyl **AND** bisacodyl    HYDROcodone-acetaminophen    HYDROmorphone **AND** naloxone    ondansetron ODT **OR** ondansetron    [COMPLETED] Insert Peripheral IV **AND** sodium chloride    sodium chloride    sodium chloride    Objective:    Lab Results (last 24 hours)       ** No results found for the last 24 hours. **             Imaging Results (Last 72 Hours)       Procedure Component Value Units Date/Time    CT Pelvis Without Contrast [[id number]] Collected: 01/19/24 1810     Updated: 01/19/24 1821    Narrative:      EXAMINATION: CT PELVIS WO CONTRAST-      1/19/2024 4:51 PM     HISTORY:  r/o pelvic fracture, pain to LLE, unable to weight bear. Fall  injury. Leg pain.     In order to have a CT radiation dose as low as reasonably achievable  Automated Exposure Control was utilized for adjustment of the mA and/or  KV according to patient size.     CT Dose DLP = 109.43 mGy.cm.  (If there are multiple studies performed at the same time this  represents the total dose).     Noncontrast pelvis CT.  Axial, sagittal, and coronal sequences.     COMPARISON:  6/17/2022.     Comminuted and mildly displaced fractures of the LEFT side of the pubic  symphysis and extending into the superior pubic ramus.  Mildly displaced fracture of the LEFT inferior pubic ramus.     Each acetabulum in each proximal femur is intact.     Questionable nondisplaced fracture involving the LEFT sacral ala.  The degree of demineralization limits detail.  A fracture in this region cannot be confirmed on the other sequences.  SI joints are symmetric.       Impression:      1. LEFT pubic symphysis and LEFT pubic ramus fractures.  2. Questionable nondisplaced LEFT sacral ala fracture.  3. No acetabulum or hip fracture.     This report was signed and finalized on 1/19/2024 6:18 PM by Dr. Johnathan Weldon MD.       XR Femur 2 View Left [[id number]] Collected: 01/19/24 1750     Updated: 01/19/24 1753    Narrative:      EXAMINATION: XR FEMUR 2 VW LEFT-     1/19/2024 4:32 PM     HISTORY: fall, LLE pain and bruising, cannot weight bear     COMPARISON:  None.     LEFT femur, 2 views.     Osteoarthritic change at the LEFT knee.  Intact hip joint and femoral neck.     Subcutaneous soft tissue contusion noted lateral to the greater  trochanter.     No femur fracture.     Arterial calcification noted.       Impression:      1. No LEFT femur fracture.     This report was signed and finalized on 1/19/2024 5:50 PM by Dr. Johnathan Weldon MD.       XR Tibia Fibula 2 View Left [[id number]] Collected: 01/19/24 1739     Updated: 01/19/24 1743    Narrative:       "EXAMINATION: XR TIBIA FIBULA 2 VW LEFT-     1/19/2024 4:32 PM     HISTORY: Fall injury. LEFT leg pain and bruising. Unable to bear weight.     COMPARISON:  None.     2 view LEFT tibia and fibula.     Osteopenia.  Moderate osteoarthritic change at the knee.  Moderate spurring at the calcaneus.     Arterial calcification noted.     No soft tissue foreign body.     No fracture.       Impression:      1. No acute fracture.     This report was signed and finalized on 1/19/2024 5:40 PM by Dr. Johnathan Weldon MD.                Physical Exam:    Vitals: /84 (BP Location: Right arm, Patient Position: Lying)   Pulse 91   Temp 97.5 °F (36.4 °C) (Oral)   Resp 16   Ht 154.9 cm (61\")   Wt 47.9 kg (105 lb 8 oz)   LMP  (LMP Unknown)   SpO2 91%   BMI 19.93 kg/m²   24 hour intake/output:  Intake/Output Summary (Last 24 hours) at 1/21/2024 1141  Last data filed at 1/21/2024 0900  Gross per 24 hour   Intake 600 ml   Output 1000 ml   Net -400 ml     Last 3 weights:  Wt Readings from Last 3 Encounters:   01/19/24 47.9 kg (105 lb 8 oz)   06/22/22 48.4 kg (106 lb 12.8 oz)   10/26/21 40.8 kg (90 lb)       General Appearance alert, appears stated age, cooperative, and hard of hearing  Head normocephalic, without obvious abnormality and atraumatic  Eyes lids and lashes normal, conjunctivae and sclerae normal, and no icterus  Neck supple and trachea midline  Lungs clear to auscultation, respirations regular, respirations even, and respirations unlabored  Heart regular rhythm & normal rate and normal S1, S2  Abdomen normal bowel sounds  Extremities no edema, no cyanosis, no redness, and palpable distal pulses  Skin turgor normal and color normal  Neurologic Mental Status alertness alert and awake and orientation person and place        Assessment:      Pelvic fracture    Hyponatremia    Hypertension          Plan:  Continue to assess ambulatory status with physical therapy.  Weightbearing as tolerated.  Has had mild improvement in " sodium.  Occasionally high but will continue to monitor before reinstituting any antihypertensives given those were discontinued as an outpatient.  Disposition plans have been discussed with daughter.    Electronically signed by Brando Milner MD on 1/21/2024 at 11:41 CST

## 2024-01-21 NOTE — THERAPY TREATMENT NOTE
Acute Care - Physical Therapy Treatment Note  [facility]     Patient Name: Corinne Rivera  : 1931  MRN: [id number]  Today's Date: 2024      Visit Dx:     ICD-10-CM ICD-9-CM   1. Ground-level fall  W18.30XA E888.9   2. Closed fracture of left pubis, unspecified portion of pubis, initial encounter  S32.502A 808.2   3. Ambulatory dysfunction  R26.2 719.7   4. Impaired mobility [Z74.09]  Z74.09 799.89     Patient Active Problem List   Diagnosis    Asymptomatic postmenopausal status    Hyponatremia    Hypertension    Acute metabolic encephalopathy    Fever    Thrombocytopenia    Elevated liver enzymes    Pelvic fracture     Past Medical History:   Diagnosis Date    Breast cancer     right     Hx of radiation therapy     Hypertension     Vitamin D deficiency      Past Surgical History:   Procedure Laterality Date    APPENDECTOMY      BREAST BIOPSY      BREAST LUMPECTOMY Right 1995    BREAST LUMPECTOMY Right     HYSTERECTOMY       PT Assessment (last 12 hours)       PT Evaluation and Treatment       Row Name 24 1110          Physical Therapy Time and Intention    Subjective Information complains of;pain  when up  -KJ     Document Type therapy note (daily note)  -KJ     Mode of Treatment physical therapy  -KJ     Patient Effort good  -KJ     Comment baseline dementia  -KJ       Row Name 24 1110          General Information    Existing Precautions/Restrictions fall  WBAT LLE  -KJ       Row Name 24 1110          Pain    Pretreatment Pain Rating 0/10 - no pain  -KJ     Posttreatment Pain Rating 7/10  -KJ     Pain Location - Side/Orientation Left  -KJ     Pain Location lower  -KJ     Pain Location - extremity  -KJ       Row Name 24 1110          Mobility    Extremity Weight-bearing Status left lower extremity  -KJ     Left Lower Extremity (Weight-bearing Status) weight-bearing as tolerated (WBAT)  -KJ       Row Name 24 1110          Bed Mobility    Supine-Sit [place] (Bed  Mobility) verbal cues;minimum assist (75% patient effort)  -KJ     Assistive Device (Bed Mobility) head of bed elevated  -KJ       Row Name 01/21/24 1110          Sit-Stand Transfer    Sit-Stand [place] (Transfers) verbal cues;minimum assist (75% patient effort)  -KJ     Assistive Device (Sit-Stand Transfers) --  HHA  -KJ       Row Name 01/21/24 1110          Gait/Stairs (Locomotion)    [place] Level (Gait) verbal cues;minimum assist (75% patient effort)  -KJ     Distance in Feet (Gait) steps from bed to chair pt holding to therapist forarms. Will try a rolling wx this pm.  -KJ       Row Name 01/21/24 1110          Motor Skills    Therapeutic Exercise aerobic  -KJ       Row Name 01/21/24 1110          Aerobic Exercise    Comment, Aerobic Exercise (Therapeutic Exercise) AROM BLE's  -KJ       Row Name 01/21/24 1110          Positioning and Restraints    Pre-Treatment Position in bed  -KJ     Post Treatment Position chair  -KJ     In Chair call light within reach;exit alarm on  -KJ               User Key  (r) = Recorded By, (t) = Taken By, (c) = Cosigned By      Initials Name Provider Type    Desire Burgos, PTA Physical Therapist Assistant                    Physical Therapy Education       Title: PT OT SLP Therapies (In Progress)       Topic: Physical Therapy (In Progress)       Point: Mobility training (In Progress)       Learning Progress Summary             Patient Acceptance, E, NL by MS at 1/20/2024 0945    Comment: role of PT in her care                         Point: Home exercise program (Not Started)       Learner Progress:  Not documented in this visit.              Point: Body mechanics (Not Started)       Learner Progress:  Not documented in this visit.              Point: Precautions (In Progress)       Learning Progress Summary             Patient Acceptance, E, NL by MS at 1/20/2024 0945    Comment: role of PT in her care                                         User Key       Initials  Effective Dates Name Provider Type Discipline    MS 07/11/23 -  Shivani Santos, PT, DPT, NCS Physical Therapist PT                  PT Recommendation and Plan     Plan of Care Reviewed With: patient  Progress: improving  Outcome Evaluation: PT tx completed. Pt has no pn at rest, increases when up . Thiago bed mobility, Thiago sit<>stand, steps to chair Thiago. A/AAROM BLE's. Baseline has dementia, but followed all commands for therapy. Mobilizing better today per daughter. Recommend cont PT.   Outcome Measures       Row Name 01/21/24 1100             How much help from another person do you currently need...    Turning from your back to your side while in flat bed without using bedrails? 3  -KJ      Moving from lying on back to sitting on the side of a flat bed without bedrails? 3  -KJ      Moving to and from a bed to a chair (including a wheelchair)? 3  -KJ      Standing up from a chair using your arms (e.g., wheelchair, bedside chair)? 2  -KJ      Climbing 3-5 steps with a railing? 1  -KJ      To walk in hospital room? 2  -KJ      AM-PAC 6 Clicks Score (PT) 14  -KJ      Highest Level of Mobility Goal 4 --> Transfer to chair/commode  -KJ         Functional Assessment    Outcome Measure Options AM-PAC 6 Clicks Basic Mobility (PT)  -KJ                User Key  (r) = Recorded By, (t) = Taken By, (c) = Cosigned By      Initials Name Provider Type    Desire Burgos PTA Physical Therapist Assistant                     Time Calculation:    PT Charges       Row Name 01/21/24 1128             Time Calculation    Start Time 1110  -KJ      Stop Time 1128  -KJ      Time Calculation (min) 18 min  -KJ      PT Received On 01/21/24  -KJ      PT Goal Re-Cert Due Date 01/30/24  -KJ         Time Calculation- PT    Total Timed Code Minutes- PT 18 minute(s)  -KJ                User Key  (r) = Recorded By, (t) = Taken By, (c) = Cosigned By      Initials Name Provider Type    Desire Burgos PTA Physical Therapist Assistant                   Therapy Charges for Today       Code Description Service Date Service Provider Modifiers Qty    73622983905 HC PT THERAPEUTIC ACT EA 15 MIN 1/21/2024 Desire Diaz, PTA GP 1            PT G-Codes  Outcome Measure Options: AM-PAC 6 Clicks Basic Mobility (PT)  AM-PAC 6 Clicks Score (PT): 14  AM-PAC 6 Clicks Score (OT): 16    Desire Diaz, ELYSSA  1/21/2024

## 2024-01-21 NOTE — PLAN OF CARE
Goal Outcome Evaluation:  Plan of Care Reviewed With: patient        Progress: improving  Outcome Evaluation: PT tx completed. Pt has no pn at rest, increases when up . Thiago bed mobility, Thiago sit<>stand, steps to chair Thiago. A/AAROM BLE's. Baseline has dementia, but followed all commands for therapy. Mobilizing better today per daughter. Recommend cont PT.

## 2024-01-21 NOTE — PLAN OF CARE
Goal Outcome Evaluation:  Plan of Care Reviewed With: patient        Progress: no change  Outcome Evaluation: She is alert and oriented to name and . Daughter at bedside. She c/o pain with any movement/turning but does not hurt if being still.  She is very [place]. She can follow simple commands. Call bell within reach. Pure wick in use with yellow urine to suction canister. Saline lock intact to left forearm. [facility] to she lives alone. Safety maintained. Will continue to monitor.

## 2024-01-21 NOTE — THERAPY TREATMENT NOTE
Acute Care - Physical Therapy Treatment Note  [facility]     Patient Name: Corinne Rivera  : 1931  MRN: [id number]  Today's Date: 2024      Visit Dx:     ICD-10-CM ICD-9-CM   1. Ground-level fall  W18.30XA E888.9   2. Closed fracture of left pubis, unspecified portion of pubis, initial encounter  S32.502A 808.2   3. Ambulatory dysfunction  R26.2 719.7   4. Impaired mobility [Z74.09]  Z74.09 799.89     Patient Active Problem List   Diagnosis    Asymptomatic postmenopausal status    Hyponatremia    Hypertension    Acute metabolic encephalopathy    Fever    Thrombocytopenia    Elevated liver enzymes    Pelvic fracture     Past Medical History:   Diagnosis Date    Breast cancer     right     Hx of radiation therapy     Hypertension     Vitamin D deficiency      Past Surgical History:   Procedure Laterality Date    APPENDECTOMY      BREAST BIOPSY      BREAST LUMPECTOMY Right 1995    BREAST LUMPECTOMY Right     HYSTERECTOMY       PT Assessment (last 12 hours)       PT Evaluation and Treatment       Row Name 24 [facility] 24 1110       Physical Therapy Time and Intention    Subjective Information complains of;pain  -KJ complains of;pain  when up  -KJ    Document Type therapy note (daily note)  -KJ therapy note (daily note)  -KJ    Mode of Treatment physical therapy  -KJ physical therapy  -KJ    Patient Effort good  -KJ good  -KJ    Comment -- baseline dementia  -KJ      Row Name 24 1315 24 1110       General Information    Existing Precautions/Restrictions fall  WBAT LLE  -KJ fall  WBAT LLE  -KJ      Row Name 24 131 24 1110       Pain    Pretreatment Pain Rating 0/10 - no pain  -KJ 0/10 - no pain  -KJ    Posttreatment Pain Rating 7/10  -KJ 7/10  -KJ    Pain Location - Side/Orientation Left  -KJ Left  -KJ    Pain Location -- lower  -KJ    Pain Location - groin  -KJ extremity  -KJ      Row Name 24 1315 24 1110       Mobility    Extremity Weight-bearing Status left  lower extremity  -KJ left lower extremity  -KJ    Left Lower Extremity (Weight-bearing Status) weight-bearing as tolerated (WBAT)  -KJ weight-bearing as tolerated (WBAT)  -KJ      Row Name 01/21/24 1315 01/21/24 1110       Bed Mobility    Supine-Sit [place] (Bed Mobility) -- verbal cues;minimum assist (75% patient effort)  -KJ    Sit-Supine [place] (Bed Mobility) minimum assist (75% patient effort);verbal cues  -KJ --    Assistive Device (Bed Mobility) head of bed elevated  -KJ head of bed elevated  -KJ      Row Name 01/21/24 1315 01/21/24 1110       Sit-Stand Transfer    Sit-Stand [place] (Transfers) verbal cues;minimum assist (75% patient effort)  -KJ verbal cues;minimum assist (75% patient effort)  -KJ    Assistive Device (Sit-Stand Transfers) --  HHA  -KJ --  HHA  -KJ      Row Name 01/21/24 1315 01/21/24 1110       Gait/Stairs (Locomotion)    [place] Level (Gait) verbal cues;minimum assist (75% patient effort)  -KJ verbal cues;minimum assist (75% patient effort)  -KJ    Assistive Device (Gait) walker, front-wheeled  several stops due to pain  -KJ --    Distance in Feet (Gait) 10  -KJ steps from bed to chair pt holding to therapist forarms. Will try a rolling wx this pm.  -KJ      Row Name 01/21/24 1110          Motor Skills    Therapeutic Exercise aerobic  -KJ       Row Name 01/21/24 1315 01/21/24 1110       Aerobic Exercise    Comment, Aerobic Exercise (Therapeutic Exercise) AROM BLE's  -KJ AROM BLE's  -KJ      Row Name 01/21/24 1315 01/21/24 1110       Positioning and Restraints    Pre-Treatment Position sitting in chair/recliner  -KJ in bed  -KJ    Post Treatment Position bed  -KJ chair  -KJ    In Bed call light within reach  -KJ --    In Chair -- call light within reach;exit alarm on  -KJ              User Key  (r) = Recorded By, (t) = Taken By, (c) = Cosigned By      Initials Name Provider Type    KJ Desire Diaz, PTA Physical Therapist Assistant                    Physical Therapy  Education       Title: PT OT SLP Therapies (In Progress)       Topic: Physical Therapy (In Progress)       Point: Mobility training (In Progress)       Learning Progress Summary             Patient Acceptance, E, NL by MS at 1/20/2024 0945    Comment: role of PT in her care                         Point: Home exercise program (Not Started)       Learner Progress:  Not documented in this visit.              Point: Body mechanics (Not Started)       Learner Progress:  Not documented in this visit.              Point: Precautions (In Progress)       Learning Progress Summary             Patient Acceptance, E, NL by MS at 1/20/2024 0945    Comment: role of PT in her care                                         User Key       Initials Effective Dates Name Provider Type Discipline    MS 07/11/23 -  Shivani Santos, PT, DPT, NCS Physical Therapist PT                  PT Recommendation and Plan     Plan of Care Reviewed With: patient  Progress: improving  Outcome Evaluation: PT tx completed. Pt has no pn at rest, increases when up . Thiago bed mobility, Thiago sit<>stand, steps to chair Thiago. A/AAROM BLE's. Baseline has dementia, but followed all commands for therapy. Mobilizing better today per daughter. Recommend cont PT.   Outcome Measures       Row Name 01/21/24 1100             How much help from another person do you currently need...    Turning from your back to your side while in flat bed without using bedrails? 3  -KJ      Moving from lying on back to sitting on the side of a flat bed without bedrails? 3  -KJ      Moving to and from a bed to a chair (including a wheelchair)? 3  -KJ      Standing up from a chair using your arms (e.g., wheelchair, bedside chair)? 2  -KJ      Climbing 3-5 steps with a railing? 1  -KJ      To walk in hospital room? 2  -KJ      AM-PAC 6 Clicks Score (PT) 14  -KJ      Highest Level of Mobility Goal 4 --> Transfer to chair/commode  -KJ         Functional Assessment    Outcome Measure Options  AM-PAC 6 Clicks Basic Mobility (PT)  -KJ                User Key  (r) = Recorded By, (t) = Taken By, (c) = Cosigned By      Initials Name Provider Type    Desire Burgos PTA Physical Therapist Assistant                     Time Calculation:    PT Charges       Row Name 01/21/24 1423 01/21/24 1128          Time Calculation    Start Time 1310  -KJ 1110  -KJ     Stop Time 1333  -KJ 1128  -KJ     Time Calculation (min) 23 min  -KJ 18 min  -KJ     PT Received On -- 01/21/24  -KJ     PT Goal Re-Cert Due Date -- 01/30/24  -KJ        Time Calculation- PT    Total Timed Code Minutes- PT 23 minute(s)  -KJ 18 minute(s)  -KJ               User Key  (r) = Recorded By, (t) = Taken By, (c) = Cosigned By      Initials Name Provider Type    Desire Burgos PTA Physical Therapist Assistant                  Therapy Charges for Today       Code Description Service Date Service Provider Modifiers Qty    11210405217 HC PT THERAPEUTIC ACT EA 15 MIN 1/21/2024 Desire Diaz, PTA GP 1    21434530685 HC GAIT TRAINING EA 15 MIN 1/21/2024 Desire Diaz, PTA GP 1    04087389970 HC PT THER PROC EA 15 MIN 1/21/2024 Desire Diaz, PTA GP 1            PT G-Codes  Outcome Measure Options: AM-PAC 6 Clicks Basic Mobility (PT)  AM-PAC 6 Clicks Score (PT): 14  AM-PAC 6 Clicks Score (OT): 16    Desire Diaz PTA  1/21/2024

## 2024-01-22 LAB
ALBUMIN SERPL-MCNC: 3.3 G/DL (ref 3.5–5.2)
ALBUMIN/GLOB SERPL: 0.9 G/DL
ALP SERPL-CCNC: 63 U/L (ref 39–117)
ALT SERPL W P-5'-P-CCNC: 8 U/L (ref 1–33)
ANION GAP SERPL CALCULATED.3IONS-SCNC: 9 MMOL/L (ref 5–15)
AST SERPL-CCNC: 15 U/L (ref 1–32)
BACTERIA UR QL AUTO: ABNORMAL /HPF
BASOPHILS # BLD AUTO: 0.05 10*3/MM3 (ref 0–0.2)
BASOPHILS NFR BLD AUTO: 0.7 % (ref 0–1.5)
BILIRUB SERPL-MCNC: 0.8 MG/DL (ref 0–1.2)
BILIRUB UR QL STRIP: NEGATIVE
BUN SERPL-MCNC: 16 MG/DL (ref 8–23)
BUN/CREAT SERPL: 32.7 (ref 7–25)
CALCIUM SPEC-SCNC: 8.5 MG/DL (ref 8.2–9.6)
CHLORIDE SERPL-SCNC: 95 MMOL/L (ref 98–107)
CLARITY UR: ABNORMAL
CO2 SERPL-SCNC: 28 MMOL/L (ref 22–29)
COLOR UR: YELLOW
CREAT SERPL-MCNC: 0.49 MG/DL (ref 0.57–1)
DEPRECATED RDW RBC AUTO: 46.8 FL (ref 37–54)
EGFRCR SERPLBLD CKD-EPI 2021: 88.6 ML/MIN/1.73
EOSINOPHIL # BLD AUTO: 0.17 10*3/MM3 (ref 0–0.4)
EOSINOPHIL NFR BLD AUTO: 2.5 % (ref 0.3–6.2)
ERYTHROCYTE [DISTWIDTH] IN BLOOD BY AUTOMATED COUNT: 13.9 % (ref 12.3–15.4)
GLOBULIN UR ELPH-MCNC: 3.6 GM/DL
GLUCOSE SERPL-MCNC: 95 MG/DL (ref 65–99)
GLUCOSE UR STRIP-MCNC: NEGATIVE MG/DL
HCT VFR BLD AUTO: 42.7 % (ref 34–46.6)
HGB BLD-MCNC: 14.2 G/DL (ref 12–15.9)
HGB UR QL STRIP.AUTO: ABNORMAL
HYALINE CASTS UR QL AUTO: ABNORMAL /LPF
IMM GRANULOCYTES # BLD AUTO: 0.02 10*3/MM3 (ref 0–0.05)
IMM GRANULOCYTES NFR BLD AUTO: 0.3 % (ref 0–0.5)
KETONES UR QL STRIP: NEGATIVE
LEUKOCYTE ESTERASE UR QL STRIP.AUTO: ABNORMAL
LYMPHOCYTES # BLD AUTO: 1.13 10*3/MM3 (ref 0.7–3.1)
LYMPHOCYTES NFR BLD AUTO: 16.6 % (ref 19.6–45.3)
MCH RBC QN AUTO: 30.5 PG (ref 26.6–33)
MCHC RBC AUTO-ENTMCNC: 33.3 G/DL (ref 31.5–35.7)
MCV RBC AUTO: 91.6 FL (ref 79–97)
MONOCYTES # BLD AUTO: 0.84 10*3/MM3 (ref 0.1–0.9)
MONOCYTES NFR BLD AUTO: 12.4 % (ref 5–12)
NEUTROPHILS NFR BLD AUTO: 4.59 10*3/MM3 (ref 1.7–7)
NEUTROPHILS NFR BLD AUTO: 67.5 % (ref 42.7–76)
NITRITE UR QL STRIP: NEGATIVE
NRBC BLD AUTO-RTO: 0 /100 WBC (ref 0–0.2)
OSMOLALITY SERPL: 284 MOSM/KG (ref 280–301)
OSMOLALITY UR: 917 MOSM/KG (ref 50–1400)
PH UR STRIP.AUTO: 6.5 [PH] (ref 5–8)
PLATELET # BLD AUTO: 239 10*3/MM3 (ref 140–450)
PMV BLD AUTO: 10.3 FL (ref 6–12)
POTASSIUM SERPL-SCNC: 4.3 MMOL/L (ref 3.5–5.2)
PROT SERPL-MCNC: 6.9 G/DL (ref 6–8.5)
PROT UR QL STRIP: NEGATIVE
RBC # BLD AUTO: 4.66 10*6/MM3 (ref 3.77–5.28)
RBC # UR STRIP: ABNORMAL /HPF
REF LAB TEST METHOD: ABNORMAL
SODIUM SERPL-SCNC: 132 MMOL/L (ref 136–145)
SODIUM UR-SCNC: 61 MMOL/L
SP GR UR STRIP: 1.01 (ref 1–1.03)
SQUAMOUS #/AREA URNS HPF: ABNORMAL /HPF
UROBILINOGEN UR QL STRIP: ABNORMAL
WBC # UR STRIP: ABNORMAL /HPF
WBC NRBC COR # BLD AUTO: 6.8 10*3/MM3 (ref 3.4–10.8)

## 2024-01-22 PROCEDURE — 80053 COMPREHEN METABOLIC PANEL: CPT

## 2024-01-22 PROCEDURE — 25010000002 CEFTRIAXONE PER 250 MG: Performed by: INTERNAL MEDICINE

## 2024-01-22 PROCEDURE — 81001 URINALYSIS AUTO W/SCOPE: CPT | Performed by: INTERNAL MEDICINE

## 2024-01-22 PROCEDURE — 85025 COMPLETE CBC W/AUTO DIFF WBC: CPT

## 2024-01-22 PROCEDURE — G0378 HOSPITAL OBSERVATION PER HR: HCPCS

## 2024-01-22 PROCEDURE — 87086 URINE CULTURE/COLONY COUNT: CPT | Performed by: INTERNAL MEDICINE

## 2024-01-22 PROCEDURE — 97110 THERAPEUTIC EXERCISES: CPT

## 2024-01-22 PROCEDURE — 83930 ASSAY OF BLOOD OSMOLALITY: CPT

## 2024-01-22 PROCEDURE — 84300 ASSAY OF URINE SODIUM: CPT

## 2024-01-22 PROCEDURE — 87077 CULTURE AEROBIC IDENTIFY: CPT | Performed by: INTERNAL MEDICINE

## 2024-01-22 PROCEDURE — 97530 THERAPEUTIC ACTIVITIES: CPT

## 2024-01-22 PROCEDURE — 83935 ASSAY OF URINE OSMOLALITY: CPT

## 2024-01-22 PROCEDURE — 87186 SC STD MICRODIL/AGAR DIL: CPT | Performed by: INTERNAL MEDICINE

## 2024-01-22 RX ADMIN — Medication 10 ML: at 21:31

## 2024-01-22 RX ADMIN — DOCUSATE SODIUM 50 MG AND SENNOSIDES 8.6 MG 2 TABLET: 8.6; 5 TABLET, FILM COATED ORAL at 08:03

## 2024-01-22 RX ADMIN — CEFTRIAXONE 1000 MG: 1 INJECTION, POWDER, FOR SOLUTION INTRAMUSCULAR; INTRAVENOUS at 21:30

## 2024-01-22 RX ADMIN — CALCIUM 1000 MG: 500 TABLET ORAL at 08:03

## 2024-01-22 RX ADMIN — Medication 10 ML: at 08:03

## 2024-01-22 NOTE — PLAN OF CARE
Goal Outcome Evaluation:  Plan of Care Reviewed With: patient, family        Progress: improving  Outcome Evaluation: Pt upright in chair. Performed BUE ther ex x10 reps x 2 sets with 3# wand for increased fxl endurance for t/fs and mobility/use of RW. Pt required demo and initiation of ther ex d/t decreased sequencing of commands. Pt pulls from RW despite cues and correction. Sit<>stand x3 reps with CGA and increased pain noted. Pt c/o burnign with urination, NSG notified. Recommend continued OT POC. Pt requests to stay in chair, family member educated on frequent position changes to decrease risk of skin breakdown, chair alarm in place and NSG notified. Recommend continued rehab

## 2024-01-22 NOTE — DISCHARGE PLACEMENT REQUEST
"To: Acute Rehab  (Patient's daughter lives in [place], [place].)     From: Ines JOHNSON [phone]        Fatimah Rivera (89 y.o. Female)       Date of Birth   06/20/1931    Social Security Number       Address   [address] [address]    Home Phone   [phone]    MRN   [id number]       [employer]   Latter day    Marital Status                               Admission Date   1/19/24    Admission Type   Emergency    Admitting Provider   Gasper Scott MD    Attending Provider   Gasper Scott MD    Department, Room/Bed   [facility] 3A, 354/1       Discharge Date       Discharge Disposition       Discharge Destination                                 Attending Provider: Gasper Scott MD    Allergies: Penicillins    Isolation: None   Infection: None   Code Status: CPR    Ht: 154.9 cm (61\")   Wt: 47.9 kg (105 lb 8 oz)    Admission Cmt: None   Principal Problem: Pelvic fracture [S32.9XXA]                   Active Insurance as of 1/19/2024       Primary Coverage       Payor Plan Insurance Group Employer/Plan Group    MEDICARE MEDICARE A & B        Payor Plan Address Payor Plan Phone Number Payor Plan Fax Number Effective Dates    [address] [phone]  6/1/1996 - None Entered    [facility] [id number]         Subscriber Name Subscriber Birth Date Member ID       FATIMAH RIVERA 6/20/1931 [id number]               Secondary Coverage       Payor Plan Insurance Group Employer/Plan Group    [facility] [id number]       Payor Plan Address Payor Plan Phone Number Payor Plan Fax Number Effective Dates    [address] [address] [phone]  11/1/2015 - None Entered    [facility] [id number]         Subscriber Name Subscriber Birth Date Member ID       FATIMAH RIVERA 6/20/1931 [id number]                     Emergency Contacts        (Rel.) Home Phone Work Phone Mobile Phone    Wendy Solares (Daughter) [phone] -- --                 History & Physical        [place] " MD Irving at 01/20/24 1141              Patient Care Team:  Gasper Scott MD as PCP - General  Gasper Scott MD as PCP - Family Medicine    Chief complaint pain in hip    Subjective    Patient is a 89 y.o. female presents with pain in hip 1 day after a fall at home. Onset of symptoms was abrupt starting 2 days ago.  Symptoms are associated with pain with ambulation.  Symptoms are aggravated by weightbearing.   Symptoms improve with nothing at home prior to arrival. Severity moderate to severe.  Context of pain related to fall at home.  Patient's daughter who was in the room with her witnessed the fall.  States she turned quickly removing something from the couch and felt on the left side.  Had pain moderately but was comfortable while seated in her recliner.  Did not wish to go to the ER at that time so was watched home overnight.  Following morning he had continued pain at which time daughter convinced her to go to the emergency room for x-rays.  [facility] pain more on the left side of hip.  Patient's daughter also mentions that she does get anxious at night and did so overnight or hospitalized.  Most of history gleaned from daughters information.    Review of Systems   Pertinent items are noted in HPI, all other systems reviewed and negative    History  Past Medical History:   Diagnosis Date    Breast cancer 1995    right     Hx of radiation therapy     Hypertension     Vitamin D deficiency      Past Surgical History:   Procedure Laterality Date    APPENDECTOMY      BREAST BIOPSY      BREAST LUMPECTOMY Right 1995    BREAST LUMPECTOMY Right     HYSTERECTOMY       Family History   Problem Relation Age of Onset    Breast cancer Mother     No Known Problems Father     No Known Problems Sister     No Known Problems Brother     No Known Problems Daughter     No Known Problems Son     No Known Problems Maternal Grandmother     No Known Problems Paternal Grandmother     No Known Problems Maternal  Aunt     No Known Problems Paternal Aunt     No Known Problems Other     BRCA 1/2 Neg Hx     Colon cancer Neg Hx     Endometrial cancer Neg Hx     Ovarian cancer Neg Hx      Social History     Tobacco Use    Smoking status: Never    Smokeless tobacco: Never   Vaping Use    Vaping Use: Never used   Substance Use Topics    Alcohol use: No    Drug use: No     E-cigarette/Vaping    E-cigarette/Vaping Use Never User      E-cigarette/Vaping Substances     Medications Prior to Admission   Medication Sig Dispense Refill Last Dose    Calcium Carbonate 1500 (600 Ca) MG tablet    1/18/2024    vitamin D (ERGOCALCIFEROL) 94988 UNITS capsule capsule Take 1 capsule by mouth Every 14 (Fourteen) Days.   Past Week    lisinopril (PRINIVIL,ZESTRIL) 2.5 MG tablet Take 2.5 mg by mouth Daily. (Patient not taking: Reported on 1/19/2024)   Not Taking     Allergies:  Penicillins    Objective    Vital Signs  Temp:  [97.5 °F (36.4 °C)-98 °F (36.7 °C)] 97.5 °F (36.4 °C)  Heart Rate:  [] 99  Resp:  [16-18] 18  BP: (136-159)/(88-95) 136/88    Physical Exam:    General Appearance: alert, appears stated age, cooperative, and seated in recliner  Head: normocephalic, without obvious abnormality and atraumatic  Eyes: lids and lashes normal, conjunctivae and sclerae normal, and no icterus  Neck: supple and trachea midline  Lungs: clear to auscultation, respirations regular, respirations even, and respirations unlabored  Heart: regular rhythm & normal rate and normal S1, S2  Abdomen: normal bowel sounds and soft non-tender  Extremities: moves extremities well, no edema, no cyanosis, and no redness  Skin: turgor normal  Neurologic: Mental Status alertness awake    Results Review:    I reviewed the patient's new clinical results.    Assessment & Plan      Pelvic fracture    Hyponatremia    Hypertension      Mechanical fall at home resulting in left pubic rami and symphysis fracture as well as questionable left sacral alar fracture.  Generally  speaking these are nonoperative stable fractures that require healing with weightbearing as tolerated.  Will ask physical therapy to evaluate and see how she does as far as weightbearing.  Also social service consult per patient's daughter help with disposition planning.  She is asking for something as needed for anxiousness, particularly at night.  Will try something as nonbenzodiazepine at a very low dose to see if that gives her any relief.  Disposition plan to be determined    I discussed the patients findings and my recommendations with patient and family.     Brando Milner MD  01/20/24  11:41 CST    Time:  In excess of 40 minutes        Electronically signed by Brando Milner MD at 01/20/24 1147       Current Facility-Administered Medications   Medication Dose Route Frequency Provider Last Rate Last Admin    acetaminophen (TYLENOL) tablet 650 mg  650 mg Oral Q4H PRN Gasper Scott MD        sennosides-docusate (PERICOLACE) 8.6-50 MG per tablet 2 tablet  2 tablet Oral BID Gasper Scott MD   2 tablet at 01/22/24 0803    And    polyethylene glycol (MIRALAX) packet 17 g  17 g Oral Daily PRN Gasper Scott MD        And    bisacodyl (DULCOLAX) EC tablet 5 mg  5 mg Oral Daily PRN Gasper Scott MD        And    bisacodyl (DULCOLAX) suppository 10 mg  10 mg Rectal Daily PRN Gasper Scott MD        calcium carbonate (oyster shell) tablet 1,000 mg  1,000 mg Oral Daily Brando Milner MD   1,000 mg at 01/22/24 0803    HYDROcodone-acetaminophen (NORCO) 5-325 MG per tablet 1 tablet  1 tablet Oral Q4H PRN Gasper Scott MD        naloxone (NARCAN) injection 0.4 mg  0.4 mg Intravenous Q5 Min PRN Gasper Scott MD        ondansetron (ZOFRAN) injection 4 mg  4 mg Intravenous Once Doc Mari MD        ondansetron ODT (ZOFRAN-ODT) disintegrating tablet 4 mg  4 mg Oral Q6H PRN Gasper Scott MD        Or    ondansetron (ZOFRAN) injection 4 mg  4 mg  Intravenous Q6H PRN Gasper Scott MD        sodium chloride 0.9 % flush 10 mL  10 mL Intravenous PRN Philipp Finn APRN        sodium chloride 0.9 % flush 10 mL  10 mL Intravenous Q12H Gasper Scott MD   10 mL at 01/22/24 0803    sodium chloride 0.9 % flush 10 mL  10 mL Intravenous PRN Gasper Scott MD        sodium chloride 0.9 % infusion 40 mL  40 mL Intravenous PRN Gasper Scott MD            Physician Progress Notes (most recent note)        Azael Zhao APRN at 01/22/24 0741       Attestation signed by Gasper Scott MD at 01/22/24 1251    I have reviewed this documentation and agree.    Significant pain with standing.  No pain when sitting or lying.    She is up in the chair and eating lunch.  She is in no distress.    1.  Continue PT and OT  2.  Will need skilled nursing facility for rehab  3.  Weightbearing as tolerated  4.  Minimize pain medication                      Chief Complaint: Hip pain      Interval History:     Patient is a 89-year-old female with a past medical history of dementia who fell at home.  Her daughter is at the bedside with her and states that she fell onto her left side.  She states that she did not hit her head or lose consciousness.  She states that as long as she is lying in bed or sitting in a chair she is not having any pain.  It is only whenever she tries to put any weight on that leg that she starts to complain of pain.  She has not been requiring any pain medication.  CT of the pelvis in the ER showed a left pubic symphysis and left pubic ramus fracture and questionable nondisplaced left sacral ala fracture. She has been attempting to participate with physical therapy.  She states she has been able to get up some but only with assistance.    She was found to be hyponatremic.  Will recheck those labs this morning.  She does have a history of this.    Review of Systems:   General ROS: negative for - chills or fever  Respiratory  "ROS: no cough, shortness of breath, or wheezing  Cardiovascular ROS: no chest pain or dyspnea on exertion  Gastrointestinal ROS: negative for - abdominal pain, diarrhea or nausea/vomiting       Vital Signs  Temp:  [97.5 °F (36.4 °C)-98.9 °F (37.2 °C)] 97.8 °F (36.6 °C)  Heart Rate:  [] 78  Resp:  [16-18] 18  BP: (128-170)/() 153/80    Intake/Output Summary (Last 24 hours) at 1/22/2024 0742  Last data filed at 1/22/2024 0422  Gross per 24 hour   Intake 600 ml   Output 550 ml   Net 50 ml       Physical Exam:     General Appearance:    Alert, cooperative, in no acute distress   Head:    N/A   Throat:   N/A   Neck:   N/A   Lungs:     Clear to auscultation,respirations regular, even and                  unlabored    Heart:    Regular rhythm and normal rate, normal S1 and S2, no            murmur, no gallop, no rub   Abdomen:     Normal bowel sounds, no masses, no organomegaly, soft        non-tender, non-distended, no guarding, no rebound                tenderness   Extremities:   No edema, no cyanosis, no clubbing   Pulses:   N/A   Skin:   N/A   Lymph nodes:   N/A   Neurologic:   N/A          Lab Review:       Lab Results (last 24 hours)       ** No results found for the last 24 hours. **          Cultures:    No results found for: \"BLOODCX\", \"URINECX\", \"WOUNDCX\", \"MRSACX\", \"RESPCX\", \"STOOLCX\"    Radiology Review:  Imaging Results (Last 24 Hours)       ** No results found for the last 24 hours. **                     ASSESSMENT:      Pelvic fracture    Hyponatremia    Hypertension      PLAN:    1.  Continue with physical therapy.  Pain medication as needed.  2.  Plan for placement in an acute inpatient rehab.  Social work following.  3.  Recheck labs this morning along with serum osmolality, urine osmolality, urinary sodium.    Further orders per Dr. Scott.      Azael Zhao, APRN  01/22/24  07:42 CST        Part of this note may be an electronic transcription/translation of spoken language to " printed text using the Dragon Dictation System.    Electronically signed by Gasper Scott MD at 24 1251       Consult Notes (most recent note)    No notes of this type exist for this encounter.          Physical Therapy Notes (all)        Shivani Santos PT, DPT, NCS at 24 1052  Version 1 of 1         Goal Outcome Evaluation:  Plan of Care Reviewed With: patient, daughter        Progress: no change  Outcome Evaluation: The patient presents alert and oriented to self only with daughter present in the room. The patient has difficulty with following directions given to her, but she performs tasks automatically. She required assist to reach EOB due to pain. Once EOB she was able to stand with assist and take a few steps to the chair though it was painful to weight bear through her LEs. She will benefit from continued PT to work on increasing her activity as tolerated. Recommend discharge to SNF.      Anticipated Discharge Disposition (PT): skilled nursing facility                          Electronically signed by Shivani Santos PT, DPT, NCS at 24 1053       Shivani Santos PT, DPT, NCS at 24 1053  Version 1 of 1         Patient Name: Corinne Rivera  : 1931    MRN: [id number]                              Today's Date: 2024       Admit Date: 2024    Visit Dx:     ICD-10-CM ICD-9-CM   1. Ground-level fall  W18.30XA E888.9   2. Closed fracture of left pubis, unspecified portion of pubis, initial encounter  S32.502A 808.2   3. Ambulatory dysfunction  R26.2 719.7   4. Impaired mobility [Z74.09]  Z74.09 799.89     Patient Active Problem List   Diagnosis    Asymptomatic postmenopausal status    Hyponatremia    Hypertension    Acute metabolic encephalopathy    Fever    Thrombocytopenia    Elevated liver enzymes    Pelvic fracture     Past Medical History:   Diagnosis Date    Breast cancer     right     Hx of radiation therapy     Hypertension     Vitamin D deficiency       Past Surgical History:   Procedure Laterality Date    APPENDECTOMY      BREAST BIOPSY      BREAST LUMPECTOMY Right 1995    BREAST LUMPECTOMY Right     HYSTERECTOMY        General Information       Row Name 01/20/24 0942          Physical Therapy Time and Intention    Document Type evaluation  s/p fall at hoome, dementia, pelvic pain, L pubic symphysis and L pubic ramus fx  -MS     Mode of Treatment physical therapy;co-treatment  -MS       Row Name 01/20/24 0942          General Information    Patient Profile Reviewed yes  -MS     Existing Precautions/Restrictions fall  WBAT L LE  -MS       Row Name 01/20/24 0942          Living Environment    People in Home alone  daughter comes to stay with her to long periods of time, otherwise someone checks on her daily  -MS       Row Name 01/20/24 0942          Home Main Entrance    Number of Stairs, Main Entrance two  -MS     Stair Railings, Main Entrance none  -MS       Row Name 01/20/24 0942          Stairs Within Home, Primary    Number of Stairs, Within Home, Primary none  -MS       Row Name 01/20/24 0942          Cognition    Orientation Status (Cognition) oriented to;person;disoriented to;place;situation;time  -MS       Row Name 01/20/24 0942          Safety Issues, Functional Mobility    Safety Issues Affecting Function (Mobility) ability to follow commands;at risk behavior observed;awareness of need for assistance;impulsivity  -MS     Impairments Affecting Function (Mobility) cognition  -MS     Cognitive Impairments, Mobility Safety/Performance attention;awareness, need for assistance;impulsivity;insight into deficits/self-awareness;judgment;problem-solving/reasoning;safety precaution awareness;safety precaution follow-through;sequencing abilities  -MS               User Key  (r) = Recorded By, (t) = Taken By, (c) = Cosigned By      Initials Name Provider Type    MS Shivani Santos R, PT, DPT, NCS Physical Therapist                   Mobility       Row Name 01/20/24  0942          Bed Mobility    Bed Mobility supine-sit  -MS     Supine-Sit [place] (Bed Mobility) moderate assist (50% patient effort);2 person assist;verbal cues;nonverbal cues (demo/gesture)  -MS     Assistive Device (Bed Mobility) head of bed elevated;bed rails  -MS     Comment, (Bed Mobility) repeated multiple attempts at directions  -MS       Row Name 01/20/24 0942          Sit-Stand Transfer    Sit-Stand [place] (Transfers) minimum assist (75% patient effort);2 person assist;verbal cues;nonverbal cues (demo/gesture)  -MS     Assistive Device (Sit-Stand Transfers) --  HHA x2  -MS       Row Name 01/20/24 0942          Gait/Stairs (Locomotion)    [place] Level (Gait) minimum assist (75% patient effort);verbal cues;nonverbal cues (demo/gesture);2 person assist  -MS     Assistive Device (Gait) --  HHA x2  -MS     Distance in Feet (Gait) 3 ft bed to chair with antalgic gait pattern  on L  -MS       Row Name 01/20/24 0942          Mobility    Extremity Weight-bearing Status left lower extremity  -MS     Left Lower Extremity (Weight-bearing Status) weight-bearing as tolerated (WBAT)  -MS               User Key  (r) = Recorded By, (t) = Taken By, (c) = Cosigned By      Initials Name Provider Type    MS Shivani Santos, PT, DPT, NCS Physical Therapist                   Obj/Interventions       Row Name 01/20/24 0942          Range of Motion Comprehensive    General Range of Motion bilateral upper extremity ROM WFL  -MS     Comment, General Range of Motion B LE painful to movement in hips. B knee and ankle AROM WFL  -MS       Row Name 01/20/24 0942          Strength Comprehensive (MMT)    Comment, General Manual Muscle Testing (MMT) Assessment B LE defered due to cognition and pain  -MS       Row Name 01/20/24 0942          Balance    Balance Assessment sitting static balance;sitting dynamic balance;standing static balance;standing dynamic balance  -MS     Static Sitting Balance contact guard  -MS      Dynamic Sitting Balance contact guard;minimal assist  posterior lean  -MS     Position, Sitting Balance supported;sitting edge of bed  -MS     Static Standing Balance minimal assist  -MS     Dynamic Standing Balance minimal assist  -MS     Position/Device Used, Standing Balance supported  HHA x2  -MS               User Key  (r) = Recorded By, (t) = Taken By, (c) = Cosigned By      Initials Name Provider Type    Shivani Hines BRITTA, PT, DPT, NCS Physical Therapist                   Goals/Plan       Row Name 01/20/24 0942          Bed Mobility Goal 1 (PT)    Activity/Assistive Device (Bed Mobility Goal 1, PT) bed mobility activities, all  -MS     [place] Level/Cues Needed (Bed Mobility Goal 1, PT) contact guard required;tactile cues required;verbal cues required  -MS     Time Frame (Bed Mobility Goal 1, PT) long term goal (LTG);by discharge  -MS     Progress/Outcomes (Bed Mobility Goal 1, PT) new goal  -MS       Row Name 01/20/24 0942          Transfer Goal 1 (PT)    Activity/Assistive Device (Transfer Goal 1, PT) sit-to-stand/stand-to-sit;bed-to-chair/chair-to-bed  -MS     [place] Level/Cues Needed (Transfer Goal 1, PT) contact guard required  -MS     Time Frame (Transfer Goal 1, PT) long term goal (LTG);by discharge  -MS     Progress/Outcome (Transfer Goal 1, PT) new goal  -MS       Row Name 01/20/24 0942          Gait Training Goal 1 (PT)    Activity/Assistive Device (Gait Training Goal 1, PT) gait (walking locomotion);decrease fall risk;diminish gait deviation;increase endurance/gait distance;improve balance and speed  -MS     [place] Level (Gait Training Goal 1, PT) contact guard required  -MS     Distance (Gait Training Goal 1, PT) 50ft  -MS     Time Frame (Gait Training Goal 1, PT) long term goal (LTG);by discharge  -MS     Progress/Outcome (Gait Training Goal 1, PT) new goal  -MS       Row Name 01/20/24 0942          Therapy Assessment/Plan (PT)    Planned Therapy Interventions (PT) balance  training;bed mobility training;gait training;patient/family education;transfer training;strengthening;ROM (range of motion)  -MS               User Key  (r) = Recorded By, (t) = Taken By, (c) = Cosigned By      Initials Name Provider Type    MS Shivani Santos R, PT, DPT, NCS Physical Therapist                   Clinical Impression       Row Name 01/20/24 0942          Pain    Pretreatment Pain Rating 0/10 - no pain  -MS     Additional Documentation Pain Scale: FACES Pre/Post-Treatment (Group)  -MS       Row Name 01/20/24 0942          Pain Scale: FACES Pre/Post-Treatment    Posttreatment Pain Rating 4-->hurts little more  -MS     Pre/Posttreatment Pain Comment pain with WB during gait  -MS       Row Name 01/20/24 0942          Plan of Care Review    Plan of Care Reviewed With patient;daughter  -MS     Progress no change  -MS     Outcome Evaluation The patient presents alert and oriented to self only with daughter present in the room. The patient has difficulty with following directions given to her, but she performs tasks automatically. She required assist to reach EOB due to pain. Once EOB she was able to stand with assist and take a few steps to the chair though it was painful to weight bear through her LEs. She will benefit from continued PT to work on increasing her activity as tolerated. Recommend discharge to SNF.  -MS       Row Name 01/20/24 0942          Therapy Assessment/Plan (PT)    Patient/Family Therapy Goals Statement (PT) per daughter, to get rehab prior to returning home  -MS     Rehab Potential (PT) good, to achieve stated therapy goals  -MS     Criteria for Skilled Interventions Met (PT) yes;meets criteria;skilled treatment is necessary  -MS     Therapy Frequency (PT) 2 times/day  -MS     Predicted Duration of Therapy Intervention (PT) until discharge  -MS       Row Name 01/20/24 0942          Positioning and Restraints    Post Treatment Position chair  -MS     In Chair reclined;call light within  reach;encouraged to call for assist;with family/caregiver;exit alarm on  -MS               User Key  (r) = Recorded By, (t) = Taken By, (c) = Cosigned By      Initials Name Provider Type    Shivani Hines BRITTA, PT, DPT, NCS Physical Therapist                   Outcome Measures       Row Name 01/20/24 0942          How much help from another person do you currently need...    Turning from your back to your side while in flat bed without using bedrails? 2  -MS     Moving from lying on back to sitting on the side of a flat bed without bedrails? 2  -MS     Moving to and from a bed to a chair (including a wheelchair)? 2  -MS     Standing up from a chair using your arms (e.g., wheelchair, bedside chair)? 2  -MS     Climbing 3-5 steps with a railing? 1  -MS     To walk in hospital room? 1  -MS     AM-PAC 6 Clicks Score (PT) 10  -MS     Highest Level of Mobility Goal 4 --> Transfer to chair/commode  -MS       Row Name 01/20/24 0942          Functional Assessment    Outcome Measure Options AM-PAC 6 Clicks Basic Mobility (PT)  -MS               User Key  (r) = Recorded By, (t) = Taken By, (c) = Cosigned By      Initials Name Provider Type    MS Santos Shivani BRITTA, PT, DPT, NCS Physical Therapist                                 Physical Therapy Education       Title: PT OT SLP Therapies (In Progress)       Topic: Physical Therapy (In Progress)       Point: Mobility training (In Progress)       Learning Progress Summary             Patient Acceptance, E, NL by MS at 1/20/2024 0945    Comment: role of PT in her care                         Point: Home exercise program (Not Started)       Learner Progress:  Not documented in this visit.              Point: Body mechanics (Not Started)       Learner Progress:  Not documented in this visit.              Point: Precautions (In Progress)       Learning Progress Summary             Patient Acceptance, E, NL by MS at 1/20/2024 0945    Comment: role of PT in her care                                          User Key       Initials Effective Dates Name Provider Type Discipline    MS 07/11/23 -  Shivani Santos PT, DPT, NCS Physical Therapist PT                  PT Recommendation and Plan  Planned Therapy Interventions (PT): balance training, bed mobility training, gait training, patient/family education, transfer training, strengthening, ROM (range of motion)  Plan of Care Reviewed With: patient, daughter  Progress: no change  Outcome Evaluation: The patient presents alert and oriented to self only with daughter present in the room. The patient has difficulty with following directions given to her, but she performs tasks automatically. She required assist to reach EOB due to pain. Once EOB she was able to stand with assist and take a few steps to the chair though it was painful to weight bear through her LEs. She will benefit from continued PT to work on increasing her activity as tolerated. Recommend discharge to SNF.     Time Calculation:         PT Charges       Row Name 01/20/24 0942             Time Calculation    Start Time 0940  10 min chart review  -MS      Stop Time 1008  -MS      Time Calculation (min) 28 min  -MS      PT Received On 01/20/24  -MS      PT Goal Re-Cert Due Date 01/30/24  -MS         Untimed Charges    PT Eval/Re-eval Minutes 38  -MS         Total Minutes    Untimed Charges Total Minutes 38  -MS       Total Minutes 38  -MS                User Key  (r) = Recorded By, (t) = Taken By, (c) = Cosigned By      Initials Name Provider Type    MS Shivani Santos PT, DPT, NCS Physical Therapist                      PT G-Codes  Outcome Measure Options: AM-PAC 6 Clicks Basic Mobility (PT)  AM-PAC 6 Clicks Score (PT): 10  PT Discharge Summary  Anticipated Discharge Disposition (PT): skilled nursing facility    Shivani Santos PT, DPT, NCS  1/20/2024      Electronically signed by Shivani Santos PT, DPT, NCS at 01/20/24 1053       Desire Diaz PTA at 01/21/24 1136  Version 1  of 1         Goal Outcome Evaluation:  Plan of Care Reviewed With: patient        Progress: improving  Outcome Evaluation: PT tx completed. Pt has no pn at rest, increases when up . Thiago bed mobility, Thiago sit<>stand, steps to chair Thiago. A/AAROM BLE's. Baseline has dementia, but followed all commands for therapy. Mobilizing better today per daughter. Recommend cont PT.                                 Electronically signed by Desire Diaz, PTA at 24 1136       Desire Diaz PTA at 24 1136  Version 1 of 1         Acute Care - Physical Therapy Treatment Note  [facility]     Patient Name: Corinne Rivera  : 1931  MRN: [id number]  Today's Date: 2024      Visit Dx:     ICD-10-CM ICD-9-CM   1. Ground-level fall  W18.30XA E888.9   2. Closed fracture of left pubis, unspecified portion of pubis, initial encounter  S32.502A 808.2   3. Ambulatory dysfunction  R26.2 719.7   4. Impaired mobility [Z74.09]  Z74.09 799.89     Patient Active Problem List   Diagnosis    Asymptomatic postmenopausal status    Hyponatremia    Hypertension    Acute metabolic encephalopathy    Fever    Thrombocytopenia    Elevated liver enzymes    Pelvic fracture     Past Medical History:   Diagnosis Date    Breast cancer     right     Hx of radiation therapy     Hypertension     Vitamin D deficiency      Past Surgical History:   Procedure Laterality Date    APPENDECTOMY      BREAST BIOPSY      BREAST LUMPECTOMY Right 1995    BREAST LUMPECTOMY Right     HYSTERECTOMY       PT Assessment (last 12 hours)       PT Evaluation and Treatment       Row Name 24 1110          Physical Therapy Time and Intention    Subjective Information complains of;pain  when up  -KJ     Document Type therapy note (daily note)  -KJ     Mode of Treatment physical therapy  -KJ     Patient Effort good  -KJ     Comment baseline dementia  -KJ       Row Name 24 1110          General Information    Existing Precautions/Restrictions fall  WBAT  LLE  -KJ       Row Name 01/21/24 1110          Pain    Pretreatment Pain Rating 0/10 - no pain  -KJ     Posttreatment Pain Rating 7/10  -KJ     Pain Location - Side/Orientation Left  -KJ     Pain Location lower  -KJ     Pain Location - extremity  -KJ       Row Name 01/21/24 1110          Mobility    Extremity Weight-bearing Status left lower extremity  -KJ     Left Lower Extremity (Weight-bearing Status) weight-bearing as tolerated (WBAT)  -KJ       Row Name 01/21/24 1110          Bed Mobility    Supine-Sit [place] (Bed Mobility) verbal cues;minimum assist (75% patient effort)  -KJ     Assistive Device (Bed Mobility) head of bed elevated  -KJ       Row Name 01/21/24 1110          Sit-Stand Transfer    Sit-Stand [place] (Transfers) verbal cues;minimum assist (75% patient effort)  -KJ     Assistive Device (Sit-Stand Transfers) --  HHA  -KJ       Row Name 01/21/24 1110          Gait/Stairs (Locomotion)    [place] Level (Gait) verbal cues;minimum assist (75% patient effort)  -KJ     Distance in Feet (Gait) steps from bed to chair pt holding to therapist forarms. Will try a rolling wx this pm.  -KJ       Row Name 01/21/24 1110          Motor Skills    Therapeutic Exercise aerobic  -KJ       Row Name 01/21/24 1110          Aerobic Exercise    Comment, Aerobic Exercise (Therapeutic Exercise) AROM BLE's  -KJ       [facility] Name 01/21/24 1110          Positioning and Restraints    Pre-Treatment Position in bed  -KJ     Post Treatment Position chair  -KJ     In Chair call light within reach;exit alarm on  -KJ               User Key  (r) = Recorded By, (t) = Taken By, (c) = Cosigned By      Initials Name Provider Type    Desire Burgos, PTA Physical Therapist Assistant                    Physical Therapy Education       Title: PT OT SLP Therapies (In Progress)       Topic: Physical Therapy (In Progress)       Point: Mobility training (In Progress)       Learning Progress Summary             Patient Acceptance, E,  NL by MS at 1/20/2024 0987    Comment: role of PT in her care                         Point: Home exercise program (Not Started)       Learner Progress:  Not documented in this visit.              Point: Body mechanics (Not Started)       Learner Progress:  Not documented in this visit.              Point: Precautions (In Progress)       Learning Progress Summary             Patient Acceptance, E, NL by MS at 1/20/2024 0945    Comment: role of PT in her care                                         User Key       Initials Effective Dates Name Provider Type Discipline    MS 07/11/23 -  Shivani Santos, PT, DPT, NCS Physical Therapist PT                  PT Recommendation and Plan     Plan of Care Reviewed With: patient  Progress: improving  Outcome Evaluation: PT tx completed. Pt has no pn at rest, increases when up . Thiago bed mobility, Thiago sit<>stand, steps to chair Thiago. A/AAROM BLE's. Baseline has dementia, but followed all commands for therapy. Mobilizing better today per daughter. Recommend cont PT.   Outcome Measures       Row Name 01/21/24 1100             How much help from another person do you currently need...    Turning from your back to your side while in flat bed without using bedrails? 3  -KJ      Moving from lying on back to sitting on the side of a flat bed without bedrails? 3  -KJ      Moving to and from a bed to a chair (including a wheelchair)? 3  -KJ      Standing up from a chair using your arms (e.g., wheelchair, bedside chair)? 2  -KJ      Climbing 3-5 steps with a railing? 1  -KJ      To walk in hospital room? 2  -KJ      AM-PAC 6 Clicks Score (PT) 14  -KJ      Highest Level of Mobility Goal 4 --> Transfer to chair/commode  -KJ         Functional Assessment    Outcome Measure Options AM-PAC 6 Clicks Basic Mobility (PT)  -KJ                User Key  (r) = Recorded By, (t) = Taken By, (c) = Cosigned By      Initials Name Provider Type    Desire Burgos, PTA Physical Therapist Assistant                      Time Calculation:    PT Charges       Row Name 24 1128             Time Calculation    Start Time 1110  -KJ      Stop Time 1128  -KJ      Time Calculation (min) 18 min  -KJ      PT Received On 24  -KJ      PT Goal Re-Cert Due Date 24  -KJ         Time Calculation- PT    Total Timed Code Minutes- PT 18 minute(s)  -KJ                User Key  (r) = Recorded By, (t) = Taken By, (c) = Cosigned By      Initials Name Provider Type    Desire Burgos PTA Physical Therapist Assistant                  Therapy Charges for Today       Code Description Service Date Service Provider Modifiers Qty    26765625399  PT THERAPEUTIC ACT EA 15 MIN 2024 Desire Diaz PTA GP 1            PT G-Codes  Outcome Measure Options: AM-PAC 6 Clicks Basic Mobility (PT)  AM-PAC 6 Clicks Score (PT): 14  AM-PAC 6 Clicks Score (OT): 16    Desire Diaz PTA  2024      Electronically signed by Desire Diaz PTA at 24 1136       Desire Diaz PTA at 24 1424  Version 1 of 1         Acute Care - Physical Therapy Treatment Note  [facility]     Patient Name: Corinne Rivera  : 1931  MRN: [id number]  Today's Date: 2024      Visit Dx:     ICD-10-CM ICD-9-CM   1. Ground-level fall  W18.30XA E888.9   2. Closed fracture of left pubis, unspecified portion of pubis, initial encounter  S32.502A 808.2   3. Ambulatory dysfunction  R26.2 719.7   4. Impaired mobility [Z74.09]  Z74.09 799.89     Patient Active Problem List   Diagnosis    Asymptomatic postmenopausal status    Hyponatremia    Hypertension    Acute metabolic encephalopathy    Fever    Thrombocytopenia    Elevated liver enzymes    Pelvic fracture     Past Medical History:   Diagnosis Date    Breast cancer     right     Hx of radiation therapy     Hypertension     Vitamin D deficiency      Past Surgical History:   Procedure Laterality Date    APPENDECTOMY      BREAST BIOPSY      BREAST LUMPECTOMY Right 1995    BREAST  LUMPECTOMY Right     HYSTERECTOMY       PT Assessment (last 12 hours)       PT Evaluation and Treatment       Row Name 01/21/24 1315 01/21/24 1110       Physical Therapy Time and Intention    Subjective Information complains of;pain  -KJ complains of;pain  when up  -KJ    Document Type therapy note (daily note)  -KJ therapy note (daily note)  -KJ    Mode of Treatment physical therapy  -KJ physical therapy  -KJ    Patient Effort good  -KJ good  -KJ    Comment -- baseline dementia  -KJ      Row Name 01/21/24 1315 01/21/24 1110       General Information    Existing Precautions/Restrictions fall  WBAT LLE  -KJ fall  WBAT LLE  -KJ      Row Name 01/21/24 1315 01/21/24 1110       Pain    Pretreatment Pain Rating 0/10 - no pain  -KJ 0/10 - no pain  -KJ    Posttreatment Pain Rating 7/10  -KJ 7/10  -KJ    Pain Location - Side/Orientation Left  -KJ Left  -KJ    Pain Location -- lower  -KJ    Pain Location - groin  -KJ extremity  -KJ      Row Name 01/21/24 131 01/21/24 1110       Mobility    Extremity Weight-bearing Status left lower extremity  -KJ left lower extremity  -KJ    Left Lower Extremity (Weight-bearing Status) weight-bearing as tolerated (WBAT)  -KJ weight-bearing as tolerated (WBAT)  -KJ      Row Name 01/21/24 131 01/21/24 1110       Bed Mobility    Supine-Sit [place] (Bed Mobility) -- verbal cues;minimum assist (75% patient effort)  -KJ    Sit-Supine [place] (Bed Mobility) minimum assist (75% patient effort);verbal cues  -KJ --    Assistive Device (Bed Mobility) head of bed elevated  -KJ head of bed elevated  -KJ      Row Name 01/21/24 1315 01/21/24 1110       Sit-Stand Transfer    Sit-Stand [place] (Transfers) verbal cues;minimum assist (75% patient effort)  -KJ verbal cues;minimum assist (75% patient effort)  -KJ    Assistive Device (Sit-Stand Transfers) --  HHA  -KJ --  HHA  -KJ      Row Name 01/21/24 1315 01/21/24 1110       Gait/Stairs (Locomotion)    [place] Level (Gait) verbal  cues;minimum assist (75% patient effort)  -KJ verbal cues;minimum assist (75% patient effort)  -KJ    Assistive Device (Gait) walker, front-wheeled  several stops due to pain  -KJ --    Distance in Feet (Gait) 10  -KJ steps from bed to chair pt holding to therapist forarms. Will try a rolling wx this pm.  -KJ      Row Name 01/21/24 1110          Motor Skills    Therapeutic Exercise aerobic  -KJ       Row Name 01/21/24 1315 01/21/24 1110       Aerobic Exercise    Comment, Aerobic Exercise (Therapeutic Exercise) AROM BLE's  -KJ AROM BLE's  -KJ      Row Name 01/21/24 1315 01/21/24 1110       Positioning and Restraints    Pre-Treatment Position sitting in chair/recliner  -KJ in bed  -KJ    Post Treatment Position bed  -KJ chair  -KJ    In Bed call light within reach  -KJ --    In Chair -- call light within reach;exit alarm on  -KJ              User Key  (r) = Recorded By, (t) = Taken By, (c) = Cosigned By      Initials Name Provider Type    Desire Burgos, PTA Physical Therapist Assistant                    Physical Therapy Education       Title: PT OT SLP Therapies (In Progress)       Topic: Physical Therapy (In Progress)       Point: Mobility training (In Progress)       Learning Progress Summary             Patient Acceptance, E, NL by MS at 1/20/2024 0945    Comment: role of PT in her care                         Point: Home exercise program (Not Started)       Learner Progress:  Not documented in this visit.              Point: Body mechanics (Not Started)       Learner Progress:  Not documented in this visit.              Point: Precautions (In Progress)       Learning Progress Summary             Patient Acceptance, E, NL by MS at 1/20/2024 0945    Comment: role of PT in her care                                         User Key       Initials Effective Dates Name Provider Type Discipline    MS 07/11/23 -  Shivani Santos, PT, DPT, NCS Physical Therapist PT                  PT Recommendation and Plan     Plan  of Care Reviewed With: patient  Progress: improving  Outcome Evaluation: PT tx completed. Pt has no pn at rest, increases when up . Thiago bed mobility, Thiago sit<>stand, steps to chair Thiago. A/AAROM BLE's. Baseline has dementia, but followed all commands for therapy. Mobilizing better today per daughter. Recommend cont PT.   Outcome Measures       Row Name 01/21/24 1100             How much help from another person do you currently need...    Turning from your back to your side while in flat bed without using bedrails? 3  -KJ      Moving from lying on back to sitting on the side of a flat bed without bedrails? 3  -KJ      Moving to and from a bed to a chair (including a wheelchair)? 3  -KJ      Standing up from a chair using your arms (e.g., wheelchair, bedside chair)? 2  -KJ      Climbing 3-5 steps with a railing? 1  -KJ      To walk in hospital room? 2  -KJ      AM-PAC 6 Clicks Score (PT) 14  -KJ      Highest Level of Mobility Goal 4 --> Transfer to chair/commode  -KJ         Functional Assessment    Outcome Measure Options AM-PAC 6 Clicks Basic Mobility (PT)  -KJ                User Key  (r) = Recorded By, (t) = Taken By, (c) = Cosigned By      Initials Name Provider Type    Desire Burgos PTA Physical Therapist Assistant                     Time Calculation:    PT Charges       Row Name 01/21/24 1423 01/21/24 1128          Time Calculation    Start Time 1310  -KJ 1110  -KJ     Stop Time 1333  -KJ 1128  -KJ     Time Calculation (min) 23 min  -KJ 18 min  -KJ     PT Received On -- 01/21/24  -KJ     PT Goal Re-Cert Due Date -- 01/30/24  -KJ        Time Calculation- PT    Total Timed Code Minutes- PT 23 minute(s)  -KJ 18 minute(s)  -KJ               User Key  (r) = Recorded By, (t) = Taken By, (c) = Cosigned By      Initials Name Provider Type    Desire Burgos PTA Physical Therapist Assistant                  Therapy Charges for Today       Code Description Service Date Service Provider Modifiers Qty     77849490868  PT THERAPEUTIC ACT EA 15 MIN 1/21/2024 Desire Diaz, PTA GP 1    55446245588 HC GAIT TRAINING EA 15 MIN 1/21/2024 Desire Diaz, PTA GP 1    [id number]  PT THER PROC EA 15 MIN 1/21/2024 Desire Diaz, PTA GP 1            PT G-Codes  Outcome Measure Options: AM-PAC 6 Clicks Basic Mobility (PT)  AM-PAC 6 Clicks Score (PT): 14  AM-PAC 6 Clicks Score (OT): 16    Desire Diaz PTA  1/21/2024      Electronically signed by Desire Diaz PTA at 01/21/24 1424       Desire Diaz PTA at 01/22/24 1324  Version 2 of 2         Goal Outcome Evaluation:  Plan of Care Reviewed With: patient        Progress: improving  Outcome Evaluation: PT tx completed. Pt sleeping, daughter present. Pt has no pn at rest. Increased L groin pn when mobilizing. Minimal assistance bed mobility, Thiago sit<>stand, pt holding to therapist forearms for foward ambulation. Few steps to recliner. Does not tolerate walking long distance due to pain. Due to dementia utilization of a walker was difficult for her to understand/manuever. Up in chair. Recommend inpatient rehab/SNF.   Pm treatment: Pt did much better utilizing r wx for walking. She ambulated about 10' Thiago, with cues for walker placement and gait mechanics. Sit up in chair a few hours and tolerates well.                                  Electronically signed by Desire Diaz PTA at 01/22/24 1403       Desire Diaz PTA at 01/22/24 1324  Version 1 of 2         Goal Outcome Evaluation:  Plan of Care Reviewed With: patient        Progress: improving  Outcome Evaluation: PT tx completed. Pt sleeping, daughter present. Pt has no pn at rest. Increased L groin pn when mobilizing. Minimal assistance bed mobility, Thiago sit<>stand, pt holding to therapist forearms for foward ambulation. Few steps to recliner. Does not tolerate walking long distance due to pain. Due to dementia utilization of a walker was difficult for her to understand/manuever. Up in chair. Recommend  inpatient rehab/SNF.                                 Electronically signed by Desire Diaz, PTA at 24 1324       Desire Diaz PTA at 24 1324  Version 1 of 1         Acute Care - Physical Therapy Treatment Note  [facility]     Patient Name: Corinne Rivera  : 1931  MRN: [id number]  Today's Date: 2024      Visit Dx:     ICD-10-CM ICD-9-CM   1. Ground-level fall  W18.30XA E888.9   2. Closed fracture of left pubis, unspecified portion of pubis, initial encounter  S32.502A 808.2   3. Ambulatory dysfunction  R26.2 719.7   4. Impaired mobility [Z74.09]  Z74.09 799.89     Patient Active Problem List   Diagnosis    Asymptomatic postmenopausal status    Hyponatremia    Hypertension    Acute metabolic encephalopathy    Fever    Thrombocytopenia    Elevated liver enzymes    Pelvic fracture     Past Medical History:   Diagnosis Date    Breast cancer     right     Hx of radiation therapy     Hypertension     Vitamin D deficiency      Past Surgical History:   Procedure Laterality Date    APPENDECTOMY      BREAST BIOPSY      BREAST LUMPECTOMY Right 1995    BREAST LUMPECTOMY Right     HYSTERECTOMY       PT Assessment (last 12 hours)       PT Evaluation and Treatment       Row Name 24 1150          Physical Therapy Time and Intention    Subjective Information complains of;pain  with mobilizing, no pn at rest  -KJ     Document Type therapy note (daily note)  -KJ     Mode of Treatment physical therapy  -KJ     Patient Effort good  -KJ       Row Name 24 1150          General Information    Existing Precautions/Restrictions fall  WBAT LLE  -KJ       Row Name 24 1150          Pain    Pretreatment Pain Rating 0/10 - no pain  -KJ     Posttreatment Pain Rating 7/10  -KJ     Pain Location - Side/Orientation Left  -KJ     Pain Location - groin  -KJ     Pre/Posttreatment Pain Comment pn only with moving/mobilizing  -KJ       Row Name 24 1150          Mobility    Extremity Weight-bearing  Status left upper extremity  -KJ     Left Lower Extremity (Weight-bearing Status) weight-bearing as tolerated (WBAT)  -KJ       Row Name 01/22/24 1150          Bed Mobility    Supine-Sit [place] (Bed Mobility) verbal cues;minimum assist (75% patient effort);contact guard  pt does best if you let her do most of the transfer  -KJ       Row Name 01/22/24 1150          Sit-Stand Transfer    Sit-Stand [place] (Transfers) verbal cues;minimum assist (75% patient effort)  -KJ       Row Name 01/22/24 1150          Gait/Stairs (Locomotion)    [place] Level (Gait) verbal cues;minimum assist (75% patient effort)  -KJ     Assistive Device (Gait) --  pt held to my forearms for steps to chair  -KJ     Distance in Feet (Gait) 6-7 steps foward  -KJ     Pattern (Gait) step-to  -KJ     Deviations/Abnormal Patterns (Gait) antalgic;gait speed decreased;stride length decreased  -KJ       Row Name 01/22/24 1150          Aerobic Exercise    Comment, Aerobic Exercise (Therapeutic Exercise) A/AAROM BLE  -KJ       Row Name 01/22/24 1150          Positioning and Restraints    Pre-Treatment Position in bed  -KJ     Post Treatment Position chair  -KJ     In Bed call light within reach  -KJ               User Key  (r) = Recorded By, (t) = Taken By, (c) = Cosigned By      Initials Name Provider Type    Desire Burgos, PTA Physical Therapist Assistant                    Physical Therapy Education       Title: PT OT SLP Therapies (In Progress)       Topic: Physical Therapy (In Progress)       Point: Mobility training (In Progress)       Learning Progress Summary             Patient Acceptance, E, NL by MS at 1/20/2024 1916    Comment: role of PT in her care                         Point: Home exercise program (Not Started)       Learner Progress:  Not documented in this visit.              Point: Body mechanics (Not Started)       Learner Progress:  Not documented in this visit.              Point: Precautions (In Progress)        Learning Progress Summary             Patient Acceptance, E, NL by MS at 1/20/2024 0945    Comment: role of PT in her care                                         User Key       Initials Effective Dates Name Provider Type Discipline    MS 07/11/23 -  Shivani Santos, PT, DPT, NCS Physical Therapist PT                  PT Recommendation and Plan     Plan of Care Reviewed With: patient  Progress: improving  Outcome Evaluation: PT tx completed. Pt sleeping, daughter present. Pt has no pn at rest. Increased L groin pn when mobilizing. Minimal assistance bed mobility, Thiago sit<>stand, pt holding to therapist forearms for foward ambulation. Few steps to recliner. Does not tolerate walking long distance due to pain. Due to dementia utilization of a walker was difficult for her to understand/manuever. Up in chair. Recommend inpatient rehab/SNF.   Outcome Measures       Row Name 01/22/24 1300 01/21/24 1100          How much help from another person do you currently need...    Turning from your back to your side while in flat bed without using bedrails? 3  -KJ 3  -KJ     Moving from lying on back to sitting on the side of a flat bed without bedrails? 3  -KJ 3  -KJ     Moving to and from a bed to a chair (including a wheelchair)? 3  -KJ 3  -KJ     Standing up from a chair using your arms (e.g., wheelchair, bedside chair)? 3  -KJ 2  -KJ     Climbing 3-5 steps with a railing? 2  -KJ 1  -KJ     To walk in hospital room? 2  -KJ 2  -KJ     AM-PAC 6 Clicks Score (PT) 16  -KJ 14  -KJ     Highest Level of Mobility Goal 5 --> Static standing  -KJ 4 --> Transfer to chair/commode  -KJ        Functional Assessment    Outcome Measure Options AM-PAC 6 Clicks Basic Mobility (PT)  -KJ AM-PAC 6 Clicks Basic Mobility (PT)  -KJ               User Key  (r) = Recorded By, (t) = Taken By, (c) = Cosigned By      Initials Name Provider Type    Desire Burgos, PTA Physical Therapist Assistant                     Time Calculation:    PT Charges        Row Name 24 1312             Time Calculation    Start Time 1150  -KJ      Stop Time 1207  -KJ      Time Calculation (min) 17 min  -KJ      PT Received On 24  -KJ      PT Goal Re-Cert Due Date 24  -KJ         Time Calculation- PT    Total Timed Code Minutes- PT 17 minute(s)  -KJ                User Key  (r) = Recorded By, (t) = Taken By, (c) = Cosigned By      Initials Name Provider Type    Desire Burgos PTA Physical Therapist Assistant                  Therapy Charges for Today       Code Description Service Date Service Provider Modifiers Qty    16483874084 HC PT THERAPEUTIC ACT EA 15 MIN 2024 Desire Diaz, PTA GP 1    05521825786 HC GAIT TRAINING EA 15 MIN 2024 Desire Diaz, PTA GP 1    96046599005 HC PT THER PROC EA 15 MIN 2024 Desire Diaz, PTA GP 1    03224896980 HC PT THERAPEUTIC ACT EA 15 MIN 2024 Desire Diaz, PTA GP 1            PT G-Codes  Outcome Measure Options: AM-PAC 6 Clicks Basic Mobility (PT)  AM-PAC 6 Clicks Score (PT): 16  AM-PAC 6 Clicks Score (OT): 16    Desire Diaz PTA  2024      Electronically signed by Desire Diaz PTA at 24 1325       Desire Diaz PTA at 24 1404  Version 1 of 1         Acute Care - Physical Therapy Treatment Note  [facility]     Patient Name: Corinne Rivera  : 1931  MRN: [id number]  Today's Date: 2024      Visit Dx:     ICD-10-CM ICD-9-CM   1. Ground-level fall  W18.30XA E888.9   2. Closed fracture of left pubis, unspecified portion of pubis, initial encounter  S32.502A 808.2   3. Ambulatory dysfunction  R26.2 719.7   4. Impaired mobility [Z74.09]  Z74.09 799.89     Patient Active Problem List   Diagnosis    Asymptomatic postmenopausal status    Hyponatremia    Hypertension    Acute metabolic encephalopathy    Fever    Thrombocytopenia    Elevated liver enzymes    Pelvic fracture     Past Medical History:   Diagnosis Date    Breast cancer     right     Hx of radiation  therapy     Hypertension     Vitamin D deficiency      Past Surgical History:   Procedure Laterality Date    APPENDECTOMY      BREAST BIOPSY      BREAST LUMPECTOMY Right 1995    BREAST LUMPECTOMY Right     HYSTERECTOMY       PT Assessment (last 12 hours)       PT Evaluation and Treatment       Row Name 01/22/24 1315 01/22/24 1150       Physical Therapy Time and Intention    Subjective Information no complaints  at rest; increases when mobilizing  -KJ complains of;pain  with mobilizing, no pn at rest  -KJ    Document Type therapy note (daily note)  -KJ therapy note (daily note)  -KJ    Mode of Treatment physical therapy  -KJ physical therapy  -KJ    Patient Effort good  -KJ good  -KJ      Row Name 01/22/24 1315 01/22/24 1150       General Information    Existing Precautions/Restrictions fall  WBAT LLE  -KJ fall  WBAT LLE  -KJ      Row Name 01/22/24 131 01/22/24 1150       Pain    Pretreatment Pain Rating 0/10 - no pain  -KJ 0/10 - no pain  -KJ    Posttreatment Pain Rating 7/10  grimace  -KJ 7/10  -KJ    Pain Location - Side/Orientation Left  -KJ Left  -KJ    Pain Location - groin  -KJ groin  -KJ    Pre/Posttreatment Pain Comment pn only with mobilizing  -KJ pn only with moving/mobilizing  -KJ      Row Name 01/22/24 1315 01/22/24 1150       Mobility    Extremity Weight-bearing Status -- --  -KJ    Left Lower Extremity (Weight-bearing Status) weight-bearing as tolerated (WBAT)  -KJ weight-bearing as tolerated (WBAT)  -KJ      Row Name 01/22/24 1315 01/22/24 1150       Bed Mobility    Supine-Sit [place] (Bed Mobility) -- verbal cues;minimum assist (75% patient effort);contact guard  pt does best if you let her do most of the transfer  -KJ    Comment, (Bed Mobility) kept up in chair for OT  -KJ --      Row Name 01/22/24 1315 01/22/24 1150       Sit-Stand Transfer    Sit-Stand [place] (Transfers) verbal cues;contact guard  -KJ verbal cues;minimum assist (75% patient effort)  -KJ    Assistive Device (Sit-Stand  Transfers) walker, front-wheeled  -KJ --      Row Name 01/22/24 1315 01/22/24 1150       Gait/Stairs (Locomotion)    [place] Level (Gait) verbal cues;minimum assist (75% patient effort)  -KJ verbal cues;minimum assist (75% patient effort)  -KJ    Assistive Device (Gait) -- --  pt held to my forearms for steps to chair  -KJ    Distance in Feet (Gait) amb from door to bed approx 10' utilizing r wx.  -KJ 6-7 steps foward  -KJ    Pattern (Gait) step-to  -KJ step-to  -KJ    Deviations/Abnormal Patterns (Gait) antalgic;huang decreased;gait speed decreased;stride length decreased  -KJ antalgic;gait speed decreased;stride length decreased  -KJ    Comment, (Gait/Stairs) followed with recliner during gait. Did better utilizing r wx this afternoon for walking. Would take 1-2 steps and stop due to pain. Mod/Max cues for wx placement  -KJ --      Row Name 01/22/24 1315          Safety Issues, Functional Mobility    Safety Issues Affecting Function (Mobility) ability to follow commands;insight into deficits/self-awareness;problem-solving  -KJ       Row Name 01/22/24 1315 01/22/24 1150       Aerobic Exercise    Comment, Aerobic Exercise (Therapeutic Exercise) AROM BLE  -KJ A/AAROM BLE  -KJ      Row Name 01/22/24 1315 01/22/24 1150       Positioning and Restraints    Pre-Treatment Position sitting in chair/recliner  -KJ in bed  -KJ    Post Treatment Position chair  -KJ chair  -KJ    In Bed call light within reach  -KJ call light within reach  -KJ              User Key  (r) = Recorded By, (t) = Taken By, (c) = Cosigned By      Initials Name Provider Type    Desire Burgos, PTA Physical Therapist Assistant                    Physical Therapy Education       Title: PT OT SLP Therapies (In Progress)       Topic: Physical Therapy (In Progress)       Point: Mobility training (In Progress)       Learning Progress Summary             Patient Acceptance, E NL by MS at 1/20/2024 7532    Comment: role of PT in her care                          Point: Home exercise program (Not Started)       Learner Progress:  Not documented in this visit.              Point: Body mechanics (Not Started)       Learner Progress:  Not documented in this visit.              Point: Precautions (In Progress)       Learning Progress Summary             Patient Acceptance, E, NL by MS at 1/20/2024 1678    Comment: role of PT in her care                                         User Key       Initials Effective Dates Name Provider Type Discipline    MS 07/11/23 -  Shivani Santos, PT, DPT, NCS Physical Therapist PT                  PT Recommendation and Plan     Plan of Care Reviewed With: patient  Progress: improving  Outcome Evaluation: PT tx completed. Pt sleeping, daughter present. Pt has no pn at rest. Increased L groin pn when mobilizing. Minimal assistance bed mobility, [place] sit<>stand, pt holding to therapist forearms for foward ambulation. Few steps to recliner. Does not tolerate walking long distance due to pain. Due to dementia utilization of a walker was difficult for her to understand/manuever. Up in chair. Recommend inpatient rehab/SNF.   Outcome Measures       Row Name 01/22/24 1300 01/21/24 1100          How much help from another person do you currently need...    Turning from your back to your side while in flat bed without using bedrails? 3  -KJ 3  -KJ     Moving from lying on back to sitting on the side of a flat bed without bedrails? 3  -KJ 3  -KJ     Moving to and from a bed to a chair (including a wheelchair)? 3  -KJ 3  -KJ     Standing up from a chair using your arms (e.g., wheelchair, bedside chair)? 3  -KJ 2  -KJ     Climbing 3-5 steps with a railing? 2  -KJ 1  -KJ     To walk in hospital room? 2  -KJ 2  -KJ     AM-PAC 6 Clicks Score (PT) 16  -KJ 14  -KJ     Highest Level of Mobility Goal 5 --> Static standing  -KJ 4 --> Transfer to chair/commode  -KJ        Functional Assessment    Outcome Measure Options AM-PAC 6 Clicks Basic Mobility (PT)   -KJ AM-PAC 6 Clicks Basic Mobility (PT)  -KJ               User Key  (r) = Recorded By, (t) = Taken By, (c) = Cosigned By      Initials Name Provider Type    Desire Burgos PTA Physical Therapist Assistant                     Time Calculation:    PT Charges       Row Name 24 1403 24 1312          Time Calculation    Start Time 1315  -KJ 1150  -KJ     Stop Time 1338  -KJ 1207  -KJ     Time Calculation (min) 23 min  -KJ 17 min  -KJ     PT Received On -- 24  -KJ     PT Goal Re-Cert Due Date -- 24  -KJ        Time Calculation- PT    Total Timed Code Minutes- PT 23 minute(s)  -KJ 17 minute(s)  -KJ               User Key  (r) = Recorded By, (t) = Taken By, (c) = Cosigned By      Initials Name Provider Type    Desire Burgos PTA Physical Therapist Assistant                  Therapy Charges for Today       Code Description Service Date Service Provider Modifiers Qty    36369302308 HC PT THERAPEUTIC ACT EA 15 MIN 2024 Desire Diaz, PTA GP 1    17832546577 HC GAIT TRAINING EA 15 MIN 2024 Desire Diaz, PTA GP 1    10193287027 HC PT THER PROC EA 15 MIN 2024 Desire Diaz, PTA GP 1    06381388067 HC PT THERAPEUTIC ACT EA 15 MIN 2024 Desire Diaz, PTA GP 1    41440880999 HC PT THER PROC EA 15 MIN 2024 Desire Diaz, PTA GP 1    30330093301 HC PT THERAPEUTIC ACT EA 15 MIN 2024 Desire Diaz, PTA GP 1            PT G-Codes  Outcome Measure Options: AM-PAC 6 Clicks Basic Mobility (PT)  AM-PAC 6 Clicks Score (PT): 16  AM-PAC 6 Clicks Score (OT): 16    Desire Diaz PTA  2024      Electronically signed by Desire Diaz PTA at 24 1404          Occupational Therapy Notes (all)        Nalini Barkley OTR/L at 24 1104          Patient Name: Corinne Rivera  : 1931    MRN: [id number]                              Today's Date: 2024       Admit Date: 2024    Visit Dx:     ICD-10-CM ICD-9-CM   1. Ground-level fall  W18.30XA  E888.9   2. Closed fracture of left pubis, unspecified portion of pubis, initial encounter  S32.502A 808.2   3. Ambulatory dysfunction  R26.2 719.7   4. Impaired mobility [Z74.09]  Z74.09 799.89     Patient Active Problem List   Diagnosis    Asymptomatic postmenopausal status    Hyponatremia    Hypertension    Acute metabolic encephalopathy    Fever    Thrombocytopenia    Elevated liver enzymes    Pelvic fracture     Past Medical History:   Diagnosis Date    Breast cancer 1995    right     Hx of radiation therapy     Hypertension     Vitamin D deficiency      Past Surgical History:   Procedure Laterality Date    APPENDECTOMY      BREAST BIOPSY      BREAST LUMPECTOMY Right 1995    BREAST LUMPECTOMY Right     HYSTERECTOMY        General Information       Row Name 01/20/24 0730          OT Time and Intention    Document Type evaluation  Presented after fall resulting in Closed fracture of left pubis. PMH: dementia, HTN  -     Mode of Treatment occupational therapy  -       Row Name 01/20/24 0730          General Information    Patient Profile Reviewed yes  -LS     Prior Level of Function independent:  Min A with bathing; I with dressing, toileting, and fxl ambulation  -     Existing Precautions/Restrictions fall  WBAT LLE  -     Barriers to Rehab cognitive status  -       Row Name 01/20/24 0730          Occupational Profile    Environmental Supports and Barriers (Occupational Profile) Walk in shower with built in seat and grab bar. Other AD/DME: [employer], rollator. Caregiver comes to check on Pt daily if daughter is not in town  -       Row Name 01/20/24 0730          Living Environment    People in Home alone  -       Row Name 01/20/24 0730          Home Main Entrance    Number of Stairs, Main Entrance two  -       Row Name 01/20/24 0730          Stairs Within Home, Primary    Number of Stairs, Within Home, Primary --  flight of stairs; Pt has access to all needs on main level  -       Row Name 01/20/24  0730          Cognition    Orientation Status (Cognition) oriented to;person;disoriented to;place;situation;time  -       Row Name 01/20/24 0730          Safety Issues, Functional Mobility    Safety Issues Affecting Function (Mobility) ability to follow commands;at risk behavior observed;awareness of need for assistance;friction/shear risk;impulsivity;insight into deficits/self-awareness;judgment;problem-solving;safety precaution awareness;safety precautions follow-through/compliance;sequencing abilities  -     Impairments Affecting Function (Mobility) cognition;strength;pain  -     Cognitive Impairments, Mobility Safety/Performance attention;awareness, need for assistance;impulsivity;insight into deficits/self-awareness;judgment;problem-solving/reasoning;safety precaution awareness;safety precaution follow-through;sequencing abilities  -               User Key  (r) = Recorded By, (t) = Taken By, (c) = Cosigned By      Initials Name Provider Type    Nalini Spears OTR/L Occupational Therapist                     Mobility/ADL's       Row Name 01/20/24 0730          Bed Mobility    Bed Mobility supine-sit  -     Supine-Sit [place] (Bed Mobility) moderate assist (50% patient effort);2 person assist;verbal cues;nonverbal cues (demo/gesture)  -     Assistive Device (Bed Mobility) bed rails;head of bed elevated;draw sheet  -       Row Name 01/20/24 0730          Transfers    Transfers sit-stand transfer  -       Row Name 01/20/24 0730          Sit-Stand Transfer    Sit-Stand [place] (Transfers) minimum assist (75% patient effort);2 person assist;verbal cues;nonverbal cues (demo/gesture)  -     Assistive Device (Sit-Stand Transfers) other (see comments)  HHA x2  -       Row Name 01/20/24 0730          Functional Mobility    Patient was able to Ambulate yes  -       Row Name 01/20/24 0730          Activities of Daily Living    BADL Assessment/Intervention upper body dressing;lower body  dressing;toileting  -       Row Name 01/20/24 0730          Mobility    Extremity Weight-bearing Status left lower extremity  -LS     Left Lower Extremity (Weight-bearing Status) weight-bearing as tolerated (WBAT)  -       Row Name 01/20/24 0730          Upper Body Dressing Assessment/Training    [place] Level (Upper Body Dressing) upper body dressing skills;minimum assist (75% patient effort)  -LS     Position (Upper Body Dressing) edge of bed sitting  -       Row Name 01/20/24 0730          Lower Body Dressing Assessment/Training    [place] Level (Lower Body Dressing) lower body dressing skills;maximum assist (25% patient effort)  -LS     Position (Lower Body Dressing) supported standing;supported sitting  -       Row Name 01/20/24 0730          Toileting Assessment/Training    [place] Level (Toileting) toileting skills;maximum assist (25% patient effort)  -LS     Position (Toileting) supported sitting;supported standing  -               User Key  (r) = Recorded By, (t) = Taken By, (c) = Cosigned By      Initials Name Provider Type     Nalini Barkley OTR/L Occupational Therapist                   Obj/Interventions       Row Name 01/20/24 0730          Range of Motion Comprehensive    General Range of Motion bilateral upper extremity ROM WFL  -       Row Name 01/20/24 0730          Strength Comprehensive (MMT)    Comment, General Manual Muscle Testing (MMT) Assessment Strength testing deferred due to impaired cognition/limited ability to follow commands  -       Row Name 01/20/24 0730          Balance    Balance Assessment sitting static balance;sitting dynamic balance;standing static balance;standing dynamic balance  -     Static Sitting Balance contact guard  -LS     Dynamic Sitting Balance contact guard;minimal assist  -LS     Position, Sitting Balance supported;sitting edge of bed  -     Static Standing Balance minimal assist  -LS     Dynamic Standing Balance minimal assist   -LS     Position/Device Used, Standing Balance supported;other (see comments)  HHA x2  -LS               User Key  (r) = Recorded By, (t) = Taken By, (c) = Cosigned By      Initials Name Provider Type    LS Nalini Barkley OTR/L Occupational Therapist                   Goals/Plan       Row Name 01/20/24 0730          Transfer Goal 1 (OT)    Activity/Assistive Device (Transfer Goal 1, OT) commode, bedside without drop arms  -LS     [place] Level/Cues Needed (Transfer Goal 1, OT) contact guard required  -LS     Time Frame (Transfer Goal 1, OT) long term goal (LTG);10 days  -LS     Progress/Outcome (Transfer Goal 1, OT) new goal  -LS       Row Name 01/20/24 0730          Dressing Goal 1 (OT)    Activity/Device (Dressing Goal 1, OT) lower body dressing  -LS     [place]/Cues Needed (Dressing Goal 1, OT) moderate assist (50-74% patient effort)  -LS     Time Frame (Dressing Goal 1, OT) long term goal (LTG);10 days  -LS     Progress/Outcome (Dressing Goal 1, OT) new goal  -LS       Row Name 01/20/24 0730          Toileting Goal 1 (OT)    Activity/Device (Toileting Goal 1, OT) toileting skills, all  -LS     [place] Level/Cues Needed (Toileting Goal 1, OT) moderate assist (50-74% patient effort)  -LS     Time Frame (Toileting Goal 1, OT) long term goal (LTG);10 days  -LS     Progress/Outcome (Toileting Goal 1, OT) new goal  -LS       Row Name 01/20/24 0730          Therapy Assessment/Plan (OT)    Planned Therapy Interventions (OT) activity tolerance training;functional balance retraining;occupation/activity based interventions;ROM/therapeutic exercise;strengthening exercise;transfer/mobility retraining;patient/caregiver education/training;adaptive equipment training;BADL retraining  -LS               User Key  (r) = Recorded By, (t) = Taken By, (c) = Cosigned By      Initials Name Provider Type    Nalini Spears OTR/L Occupational Therapist                   Clinical Impression       Row Name 01/20/24 0730           Pain Assessment    Pretreatment Pain Rating 0/10 - no pain  -       Row Name 01/20/24 0730          Pain Scale: FACES Pre/Post-Treatment    Posttreatment Pain Rating 4-->hurts little more  -       Row Name 01/20/24 0730          Plan of Care Review    Plan of Care Reviewed With patient;daughter  -     Progress no change  -     Outcome Evaluation OT eval completed. Pt in fowlers upon therapist arrival; A&O to person only; No c/o pain at rest; Pt's daughter also present. Pt's daughter reports that the Pt was performing most BADLs with supervision-Mod I at PLOF. Today, Pt performed supine>sit utilizing bedrail with HOB elevated with Mod A x2 and constant verbal/visual/tactile cues for sequencing and body mechanics. Pt dependent for donning B socks. Pt performed sit>stand and took a few steps toward chair requiring Min A with HHA x2. Skilled OT intervention indicated in order to address deficits in fxl mobility, fxl activity tolerance, balance, strength, and use of adaptive techniques/equipment during performance of BADLs. Recommend SNF at discharge.  -       Row Name 01/20/24 0730          Therapy Assessment/Plan (OT)    Rehab Potential (OT) good, to achieve stated therapy goals  -     Criteria for Skilled Therapeutic Interventions Met (OT) yes;skilled treatment is necessary  -     Therapy Frequency (OT) 5 times/wk  -       Row Name 01/20/24 0730          Therapy Plan Review/Discharge Plan (OT)    Anticipated Discharge Disposition (OT) skilled nursing facility  -       Row Name 01/20/24 0730          Positioning and Restraints    Pre-Treatment Position in bed  -     Post Treatment Position chair  -LS     In Chair reclined;call light within reach;encouraged to call for assist;exit alarm on;legs elevated;with family/caregiver;notified [facility] [place]  -               User Key  (r) = Recorded By, (t) = Taken By, (c) = Cosigned By      Initials Name Provider Type    Nalini Spears OTR/L Occupational  Therapist                   Outcome Measures       Row Name 01/20/24 0730          How much help from another is currently needed...    Putting on and taking off regular lower body clothing? 2  -LS     Bathing (including washing, rinsing, and drying) 2  -LS     Toileting (which includes using toilet bed pan or urinal) 2  -LS     Putting on and taking off regular upper body clothing 3  -LS     Taking care of personal grooming (such as brushing teeth) 3  -LS     Eating meals 4  -LS     AM-PAC 6 Clicks Score (OT) 16  -LS       Row Name 01/20/24 0942          How much help from another person do you currently need...    Turning from your back to your side while in flat bed without using bedrails? 2  -MS     Moving from lying on back to sitting on the side of a flat bed without bedrails? 2  -MS     Moving to and from a bed to a chair (including a wheelchair)? 2  -MS     Standing up from a chair using your arms (e.g., wheelchair, bedside chair)? 2  -MS     Climbing 3-5 steps with a railing? 1  -MS     To walk in hospital room? 1  -MS     AM-PAC 6 Clicks Score (PT) 10  -MS     Highest Level of Mobility Goal 4 --> Transfer to chair/commode  -MS       Row Name 01/20/24 0942 01/20/24 0730       Functional Assessment    Outcome Measure Options AM-PAC 6 Clicks Basic Mobility (PT)  -MS AM-PAC 6 Clicks Daily Activity (OT)  -LS              User Key  (r) = Recorded By, (t) = Taken By, (c) = Cosigned By      Initials Name Provider Type    Shivani Hines, PT, DPT, NCS Physical Therapist    Nalini Spears, OTR/L Occupational Therapist                    Occupational Therapy Education       Title: PT OT SLP Therapies (In Progress)       Topic: Occupational Therapy (In Progress)       Point: ADL training (Done)       Description:   Instruct learner(s) on proper safety adaptation and remediation techniques during self care or transfers.   Instruct in proper use of assistive devices.                  Learning Progress Summary              Patient Acceptance, E, VU,NR by  at 1/20/2024 1103                         Point: Home exercise program (Not Started)       Description:   Instruct learner(s) on appropriate technique for monitoring, assisting and/or progressing therapeutic exercises/activities.                  Learner Progress:  Not documented in this visit.              Point: Precautions (Done)       Description:   Instruct learner(s) on prescribed precautions during self-care and functional transfers.                  Learning Progress Summary             Patient Acceptance, E, VU,NR by  at 1/20/2024 1103                         Point: Body mechanics (Done)       Description:   Instruct learner(s) on proper positioning and spine alignment during self-care, functional mobility activities and/or exercises.                  Learning Progress Summary             Patient Acceptance, E, VU,NR by  at 1/20/2024 1103                                         User Key       Initials Effective Dates Name Provider Type Discipline     06/20/22 -  Nalini Barkley, OTR/L Occupational Therapist OT                  OT Recommendation and Plan  Planned Therapy Interventions (OT): activity tolerance training, functional balance retraining, occupation/activity based interventions, ROM/therapeutic exercise, strengthening exercise, transfer/mobility retraining, patient/caregiver education/training, adaptive equipment training, BADL retraining  Therapy Frequency (OT): 5 times/wk  Plan of Care Review  Plan of Care Reviewed With: patient, daughter  Progress: no change  Outcome Evaluation: OT eval completed. Pt in fowlers upon therapist arrival; A&O to person only; No c/o pain at rest; Pt's daughter also present. Pt's daughter reports that the Pt was performing most BADLs with supervision-Mod I at PLOF. Today, Pt performed supine>sit utilizing bedrail with HOB elevated with Mod A x2 and constant verbal/visual/tactile cues for sequencing and body mechanics.  Pt dependent for donning B socks. Pt performed sit>stand and took a few steps toward chair requiring Min A with HHA x2. Skilled OT intervention indicated in order to address deficits in fxl mobility, fxl activity tolerance, balance, strength, and use of adaptive techniques/equipment during performance of BADLs. Recommend SNF at discharge.     Time Calculation:         Time Calculation- OT       Row Name 01/20/24 0730             Time Calculation- OT    OT Start Time 0940  +15 minutes chart review  -      OT Stop Time 1006  -      OT Time Calculation (min) 26 min  -LS      OT Non-Billable Time (min) 41 min  -LS      OT Received On 01/20/24  -      OT Goal Re-Cert Due Date 01/30/24  -                User Key  (r) = Recorded By, (t) = Taken By, (c) = Cosigned By      Initials Name Provider Type    Nalini Spears OTR/L Occupational Therapist                  Therapy Charges for Today       Code Description Service Date Service Provider Modifiers Qty    96392274477 HC OT EVAL MOD COMPLEXITY 3 1/20/2024 Nalini Barkley OTR/L GO 1                 Nalini Barkley OTR/L  1/20/2024    Electronically signed by Nalini Barkley OTR/L at 01/20/24 1104       Nalini Barkley OTR/L at 01/20/24 1103          Goal Outcome Evaluation:  Plan of Care Reviewed With: patient, daughter        Progress: no change  Outcome Evaluation: OT eval completed. Pt in fowlers upon therapist arrival; A&O to person only; No c/o pain at rest; Pt's daughter also present. Pt's daughter reports that the Pt was performing most BADLs with supervision-Mod I at OF. Today, Pt performed supine>sit utilizing bedrail with HOB elevated with Mod A x2 and constant verbal/visual/tactile cues for sequencing and body mechanics. Pt dependent for donning B socks. Pt performed sit>stand and took a few steps toward chair requiring Min A with HHA x2. Skilled OT intervention indicated in order to address deficits in fxl mobility, fxl activity tolerance, balance,  strength, and use of adaptive techniques/equipment during performance of BADLs. Recommend SNF at discharge.      Anticipated Discharge Disposition (OT): skilled nursing facility                          Electronically signed by Nalini Barkley OTR/L at 01/20/24 4537

## 2024-01-22 NOTE — THERAPY TREATMENT NOTE
Acute Care - Physical Therapy Treatment Note  [facility]     Patient Name: Corinne Rivera  : 1931  MRN: [id number]  Today's Date: 2024      Visit Dx:     ICD-10-CM ICD-9-CM   1. Ground-level fall  W18.30XA E888.9   2. Closed fracture of left pubis, unspecified portion of pubis, initial encounter  S32.502A 808.2   3. Ambulatory dysfunction  R26.2 719.7   4. Impaired mobility [Z74.09]  Z74.09 799.89     Patient Active Problem List   Diagnosis    Asymptomatic postmenopausal status    Hyponatremia    Hypertension    Acute metabolic encephalopathy    Fever    Thrombocytopenia    Elevated liver enzymes    Pelvic fracture     Past Medical History:   Diagnosis Date    Breast cancer     right     Hx of radiation therapy     Hypertension     Vitamin D deficiency      Past Surgical History:   Procedure Laterality Date    APPENDECTOMY      BREAST BIOPSY      BREAST LUMPECTOMY Right 1995    BREAST LUMPECTOMY Right     HYSTERECTOMY       PT Assessment (last 12 hours)       PT Evaluation and Treatment       Row Name 24 1150          Physical Therapy Time and Intention    Subjective Information complains of;pain  with mobilizing, no pn at rest  -KJ     Document Type therapy note (daily note)  -KJ     Mode of Treatment physical therapy  -KJ     Patient Effort good  -KJ       Row Name 24 1150          General Information    Existing Precautions/Restrictions fall  WBAT LLE  -KJ       Row Name 24 1150          Pain    Pretreatment Pain Rating 0/10 - no pain  -KJ     Posttreatment Pain Rating 7/10  -KJ     Pain Location - Side/Orientation Left  -KJ     Pain Location - groin  -KJ     Pre/Posttreatment Pain Comment pn only with moving/mobilizing  -KJ       Row Name 24 1150          Mobility    Extremity Weight-bearing Status left upper extremity  -KJ     Left Lower Extremity (Weight-bearing Status) weight-bearing as tolerated (WBAT)  -KJ       Row Name 24 1150          Bed Mobility     Supine-Sit [place] (Bed Mobility) verbal cues;minimum assist (75% patient effort);contact guard  pt does best if you let her do most of the transfer  -KJ       Row Name 01/22/24 1150          Sit-Stand Transfer    Sit-Stand [place] (Transfers) verbal cues;minimum assist (75% patient effort)  -KJ       Row Name 01/22/24 1150          Gait/Stairs (Locomotion)    [place] Level (Gait) verbal cues;minimum assist (75% patient effort)  -KJ     Assistive Device (Gait) --  pt held to my forearms for steps to chair  -KJ     Distance in Feet (Gait) 6-7 steps foward  -KJ     Pattern (Gait) step-to  -KJ     Deviations/Abnormal Patterns (Gait) antalgic;gait speed decreased;stride length decreased  -KJ       Row Name 01/22/24 1150          Aerobic Exercise    Comment, Aerobic Exercise (Therapeutic Exercise) A/AAROM BLE  -KJ       Row Name 01/22/24 1150          Positioning and Restraints    Pre-Treatment Position in bed  -KJ     Post Treatment Position chair  -KJ     In Bed call light within reach  -KJ               User Key  (r) = Recorded By, (t) = Taken By, (c) = Cosigned By      Initials Name Provider Type    Desire Burgos, PTA Physical Therapist Assistant                    Physical Therapy Education       Title: PT OT SLP Therapies (In Progress)       Topic: Physical Therapy (In Progress)       Point: Mobility training (In Progress)       Learning Progress Summary             Patient Acceptance, E, NL by MS at 1/20/2024 0945    Comment: role of PT in her care                         Point: Home exercise program (Not Started)       Learner Progress:  Not documented in this visit.              Point: Body mechanics (Not Started)       Learner Progress:  Not documented in this visit.              Point: Precautions (In Progress)       Learning Progress Summary             Patient Acceptance, E, NL by MS at 1/20/2024 0945    Comment: role of PT in her care                                         User Key        Initials Effective Dates Name Provider Type Discipline    MS 07/11/23 -  Shivani Santos R, PT, DPT, NCS Physical Therapist PT                  PT Recommendation and Plan     Plan of Care Reviewed With: patient  Progress: improving  Outcome Evaluation: PT tx completed. Pt sleeping, daughter present. Pt has no pn at rest. Increased L groin pn when mobilizing. Minimal assistance bed mobility, Thiago sit<>stand, pt holding to therapist forearms for foward ambulation. Few steps to recliner. Does not tolerate walking long distance due to pain. Due to dementia utilization of a walker was difficult for her to understand/manuever. Up in chair. Recommend inpatient rehab/SNF.   Outcome Measures       Row Name 01/22/24 1300 01/21/24 1100          How much help from another person do you currently need...    Turning from your back to your side while in flat bed without using bedrails? 3  -KJ 3  -KJ     Moving from lying on back to sitting on the side of a flat bed without bedrails? 3  -KJ 3  -KJ     Moving to and from a bed to a chair (including a wheelchair)? 3  -KJ 3  -KJ     Standing up from a chair using your arms (e.g., wheelchair, bedside chair)? 3  -KJ 2  -KJ     Climbing 3-5 steps with a railing? 2  -KJ 1  -KJ     To walk in hospital room? 2  -KJ 2  -KJ     AM-PAC 6 Clicks Score (PT) 16  -KJ 14  -KJ     Highest Level of Mobility Goal 5 --> Static standing  -KJ 4 --> Transfer to chair/commode  -KJ        Functional Assessment    Outcome Measure Options AM-PAC 6 Clicks Basic Mobility (PT)  -KJ AM-PAC 6 Clicks Basic Mobility (PT)  -KJ               User Key  (r) = Recorded By, (t) = Taken By, (c) = Cosigned By      Initials Name Provider Type    KJ Desire Diaz, PTA Physical Therapist Assistant                     Time Calculation:    PT Charges       Row Name 01/22/24 1312             Time Calculation    Start Time 1150  -KJ      Stop Time 1207  -KJ      Time Calculation (min) 17 min  -KJ      PT Received On 01/22/24   -KJ      PT Goal Re-Cert Due Date 01/30/24  -KJ         Time Calculation- PT    Total Timed Code Minutes- PT 17 minute(s)  -KJ                User Key  (r) = Recorded By, (t) = Taken By, (c) = Cosigned By      Initials Name Provider Type    Desire Burgos, ELYSSA Physical Therapist Assistant                  Therapy Charges for Today       Code Description Service Date Service Provider Modifiers Qty    81109075793 HC PT THERAPEUTIC ACT EA 15 MIN 1/21/2024 Desire Diaz, PTA GP 1    86179073534 HC GAIT TRAINING EA 15 MIN 1/21/2024 Desire Diaz, PTA GP 1    97784641616 HC PT THER PROC EA 15 MIN 1/21/2024 Desire Diaz, PTA GP 1    76432511397 HC PT THERAPEUTIC ACT EA 15 MIN 1/22/2024 Desire Diaz, PTA GP 1            PT G-Codes  Outcome Measure Options: AM-PAC 6 Clicks Basic Mobility (PT)  AM-PAC 6 Clicks Score (PT): 16  AM-PAC 6 Clicks Score (OT): 16    Desire Diaz PTA  1/22/2024

## 2024-01-22 NOTE — PROGRESS NOTES
Chief Complaint: Hip pain      Interval History:     Patient is a 89-year-old female with a past medical history of dementia who fell at home.  Her daughter is at the bedside with her and states that she fell onto her left side.  She states that she did not hit her head or lose consciousness.  She states that as long as she is lying in bed or sitting in a chair she is not having any pain.  It is only whenever she tries to put any weight on that leg that she starts to complain of pain.  She has not been requiring any pain medication.  CT of the pelvis in the ER showed a left pubic symphysis and left pubic ramus fracture and questionable nondisplaced left sacral ala fracture. She has been attempting to participate with physical therapy.  She states she has been able to get up some but only with assistance.    She was found to be hyponatremic.  Will recheck those labs this morning.  She does have a history of this.    Review of Systems:   General ROS: negative for - chills or fever  Respiratory ROS: no cough, shortness of breath, or wheezing  Cardiovascular ROS: no chest pain or dyspnea on exertion  Gastrointestinal ROS: negative for - abdominal pain, diarrhea or nausea/vomiting       Vital Signs  Temp:  [97.5 °F (36.4 °C)-98.9 °F (37.2 °C)] 97.8 °F (36.6 °C)  Heart Rate:  [] 78  Resp:  [16-18] 18  BP: (128-170)/() 153/80    Intake/Output Summary (Last 24 hours) at 1/22/2024 0742  Last data filed at 1/22/2024 0422  Gross per 24 hour   Intake 600 ml   Output 550 ml   Net 50 ml       Physical Exam:     General Appearance:    Alert, cooperative, in no acute distress   Head:    N/A   Throat:   N/A   Neck:   N/A   Lungs:     Clear to auscultation,respirations regular, even and                  unlabored    Heart:    Regular rhythm and normal rate, normal S1 and S2, no            murmur, no gallop, no rub   Abdomen:     Normal bowel sounds, no masses, no organomegaly, soft        non-tender, non-distended, no  "guarding, no rebound                tenderness   Extremities:   No edema, no cyanosis, no clubbing   Pulses:   N/A   Skin:   N/A   Lymph nodes:   N/A   Neurologic:   N/A          Lab Review:       Lab Results (last 24 hours)       ** No results found for the last 24 hours. **          Cultures:    No results found for: \"BLOODCX\", \"URINECX\", \"WOUNDCX\", \"MRSACX\", \"RESPCX\", \"STOOLCX\"    Radiology Review:  Imaging Results (Last 24 Hours)       ** No results found for the last 24 hours. **                     ASSESSMENT:      Pelvic fracture    Hyponatremia    Hypertension      PLAN:    1.  Continue with physical therapy.  Pain medication as needed.  2.  Plan for placement in an acute inpatient rehab.  Social work following.  3.  Recheck labs this morning along with serum osmolality, urine osmolality, urinary sodium.    Further orders per Dr. Scott.      Azael Zhao, APRN  01/22/24  07:42 CST        Part of this note may be an electronic transcription/translation of spoken language to printed text using the Dragon Dictation System.  "

## 2024-01-22 NOTE — CASE MANAGEMENT/SOCIAL WORK
Continued Stay Note  [facility]     Patient Name: Corinne Rivera  MRN: [id number]  Today's Date: 1/22/2024    Admit Date: 1/19/2024    Plan: Acute Rehab   Discharge Plan       Row Name 01/22/24 4117       Plan    Plan Acute Rehab    Plan Comments [facility] has declined admit.  BARRY spoke with patient's daughter regarding referrals to other acute rehabs.  Daughter resides in [place], [place], and prefers referrals be made to [place] and [place].  Will proceed with referrals and await response.  Daughter has advised patient going home with 24 hour care is not an option for them.  SNF placement not an option due to observation status.      Row Name 01/22/24 8290       Plan    Plan Referral to [facility]    Patient/Family in Agreement with Plan yes    Provided Post Acute Provider List? Yes    Post Acute Provider List Inpatient Rehab    Provided Post Acute Provider Quality & Resource List? Yes    Post Acute Provider Quality and Resource List Inpatient Rehab    Delivered To Support Person    Support Person Daughter - Wendy    Method of Delivery Telephone    Plan Comments BARRY spoke with patient's daughter regarding the option of acute rehab.  SNF placement will not be covered by Medicare at this time due to patient being admitted as observation.  Referral sent to [facility], will advise of their decision.                   Discharge Codes    No documentation.                       KELSEY Bhandari

## 2024-01-22 NOTE — THERAPY TREATMENT NOTE
Patient Name: Corinne Rivera  : 1931    MRN: [id number]                              Today's Date: 2024       Admit Date: 2024    Visit Dx: Therapist utilized gait belt, applied non-slipped socks, provided fall risk education/prevention, & facilitated muscle strengthening PRN to reduce patient falls risk during this session.      ICD-10-CM ICD-9-CM   1. Ground-level fall  W18.30XA E888.9   2. Closed fracture of left pubis, unspecified portion of pubis, initial encounter  S32.502A 808.2   3. Ambulatory dysfunction  R26.2 719.7   4. Impaired mobility [Z74.09]  Z74.09 799.89     Patient Active Problem List   Diagnosis    Asymptomatic postmenopausal status    Hyponatremia    Hypertension    Acute metabolic encephalopathy    Fever    Thrombocytopenia    Elevated liver enzymes    Pelvic fracture     Past Medical History:   Diagnosis Date    Breast cancer     right     Hx of radiation therapy     Hypertension     Vitamin D deficiency      Past Surgical History:   Procedure Laterality Date    APPENDECTOMY      BREAST BIOPSY      BREAST LUMPECTOMY Right 1995    BREAST LUMPECTOMY Right     HYSTERECTOMY        General Information       Row Name 24 1420          OT Time and Intention    Document Type therapy note (daily note)  -MT     Mode of Treatment occupational therapy  -MT       Row Name 24 1420          General Information    Patient Profile Reviewed yes  -MT     Existing Precautions/Restrictions fall  WBAT LLE  -MT       Row Name 24 1420          Safety Issues, Functional Mobility    Impairments Affecting Function (Mobility) cognition;pain  -MT               User Key  (r) = Recorded By, (t) = Taken By, (c) = Cosigned By      Initials Name Provider Type    MT Maricarmen Castillo COTA Occupational Therapist Assistant                     Mobility/ADL's       Row Name 24 1420          Transfers    Comment, (Transfers) in chair, pt requested to stay in chair d/t comfort. Notified  family member in room to encourage frequent repositioning to decrease risk of skin breakdown. Notified NSG, Chair alarm and call light with pt  -MT       Row Name 01/22/24 1420          Sit-Stand Transfer    Sit-Stand [place] (Transfers) verbal cues;contact guard  -MT     Assistive Device (Sit-Stand Transfers) walker, front-wheeled  -MT     Comment, (Sit-Stand Transfer) pt pulls from RW. Decreased cognition and processing to understand sequencing of task  -[facility] Name 01/22/24 1420          Mobility    Extremity Weight-bearing Status left lower extremity  -MT     Left Lower Extremity (Weight-bearing Status) weight-bearing as tolerated (WBAT)  -MT               User Key  (r) = Recorded By, (t) = Taken By, (c) = Cosigned By      Initials Name Provider Type    MT Maricarmen Castillo COTA Occupational Therapist Assistant                   Obj/Interventions    No documentation.                  Goals/Plan    No documentation.                  Clinical Impression       [facility] Name 01/22/24 1420          Pain Assessment    Additional Documentation Pain Scale: FACES Pre/Post-Treatment (Group)  -MT       Row Name 01/22/24 1420          Pain Scale: FACES Pre/Post-Treatment    Pain: FACES Scale, Pretreatment 0-->no hurt  -MT     Posttreatment Pain Rating 4-->hurts little more  -MT     Pre/Posttreatment Pain Comment with standing  -[facility] Name 01/22/24 1420          Therapy Plan Review/Discharge Plan (OT)    Anticipated Discharge Disposition (OT) skilled nursing facility  -[facility] Name 01/22/24 1420          Positioning and Restraints    Pre-Treatment Position sitting in chair/recliner  -MT     Post Treatment Position chair  -MT     In Chair notified nsg;sitting;call light within reach;encouraged to call for assist;exit alarm on;with family/caregiver  -MT               User Key  (r) = Recorded By, (t) = Taken By, (c) = Cosigned By      Initials Name Provider Type    Maricarmen Thapa COTA Occupational  Therapist Assistant                   Outcome Measures       Row Name 01/22/24 1420          How much help from another is currently needed...    Putting on and taking off regular lower body clothing? 2  -MT     Bathing (including washing, rinsing, and drying) 2  -MT     Toileting (which includes using toilet bed pan or urinal) 2  -MT     Putting on and taking off regular upper body clothing 3  -MT     Taking care of personal grooming (such as brushing teeth) 3  -MT     Eating meals 4  -MT     AM-PAC 6 Clicks Score (OT) 16  -MT       Row Name 01/22/24 1300 01/22/24 0805       How much help from another person do you currently need...    Turning from your back to your side while in flat bed without using bedrails? 3  -KJ 3  -CK    Moving from lying on back to sitting on the side of a flat bed without bedrails? 3  -KJ 3  -CK    Moving to and from a bed to a chair (including a wheelchair)? 3  -KJ 3  -CK    Standing up from a chair using your arms (e.g., wheelchair, bedside chair)? 3  -KJ 2  -CK    Climbing 3-5 steps with a railing? 2  -KJ 1  -CK    To walk in hospital room? 2  -KJ 2  -CK    AM-PAC 6 Clicks Score (PT) 16  -KJ 14  -CK    Highest Level of Mobility Goal 5 --> Static standing  -KJ 4 --> Transfer to chair/commode  -CK      Row Name 01/22/24 1300          Functional Assessment    Outcome Measure Options AM-PAC 6 Clicks Basic Mobility (PT)  -KJ               User Key  (r) = Recorded By, (t) = Taken By, (c) = Cosigned By      Initials Name Provider Type    Desire Burgos PTA Physical Therapist Assistant    Maricarmen Thapa COTA Occupational Therapist Assistant    CK Abner Hoffmann, RN Registered Nurse                    Occupational Therapy Education       Title: PT OT SLP Therapies (In Progress)       Topic: Occupational Therapy (In Progress)       Point: ADL training (Done)       Description:   Instruct learner(s) on proper safety adaptation and remediation techniques during self care or transfers.    Instruct in proper use of assistive devices.                  Learning Progress Summary             Patient Acceptance, E, VU,NR by  at 1/20/2024 1103                         Point: Home exercise program (Not Started)       Description:   Instruct learner(s) on appropriate technique for monitoring, assisting and/or progressing therapeutic exercises/activities.                  Learner Progress:  Not documented in this visit.              Point: Precautions (Done)       Description:   Instruct learner(s) on prescribed precautions during self-care and functional transfers.                  Learning Progress Summary             Patient Acceptance, E, VU,NR by  at 1/20/2024 1103                         Point: Body mechanics (Done)       Description:   Instruct learner(s) on proper positioning and spine alignment during self-care, functional mobility activities and/or exercises.                  Learning Progress Summary             Patient Acceptance, E, VU,NR by  at 1/20/2024 1103                                         User Key       Initials Effective Dates Name Provider Type Discipline     06/20/22 -  Nalini Barkley, OTR/L Occupational Therapist OT                  OT Recommendation and Plan     Plan of Care Review  Plan of Care Reviewed With: patient, family  Progress: improving  Outcome Evaluation: Pt upright in chair. Performed BUE ther ex x10 reps x 2 sets with 3# wand for increased fxl endurance for t/fs and mobility/use of RW. Pt required demo and initiation of ther ex d/t decreased sequencing of commands. Pt pulls from RW despite cues and correction. Sit<>stand x3 reps with CGA and increased pain noted. Pt c/o burnign with urination, NSG notified. Recommend continued OT POC. Pt requests to stay in chair, family member educated on frequent position changes to decrease risk of skin breakdown, chair alarm in place and NSG notified. Recommend continued rehab     Time Calculation:         Time Calculation-  OT       Row Name 01/22/24 1420             Time Calculation- OT    OT Start Time 1420  -MT      OT Stop Time 1445  -MT      OT Time Calculation (min) 25 min  -MT      Total Timed Code Minutes- OT 25 minute(s)  -MT      OT Received On 01/22/24  -MT         Timed Charges    74300 - OT Therapeutic Exercise Minutes 15  -MT      29753 - OT Therapeutic Activity Minutes 10  -MT         Total Minutes    Timed Charges Total Minutes 25  -MT       Total Minutes 25  -MT                User Key  (r) = Recorded By, (t) = Taken By, (c) = Cosigned By      Initials Name Provider Type    MT Maricarmen Castillo COTA Occupational Therapist Assistant                  Therapy Charges for Today       Code Description Service Date Service Provider Modifiers Qty    01666767260 HC OT THER PROC EA 15 MIN 1/22/2024 Maricarmen Castillo COTA GO 1    79442391354 HC OT THERAPEUTIC ACT EA 15 MIN 1/22/2024 Maricarmen Castillo COTA GO 1                 ARIANA Mead  1/22/2024

## 2024-01-22 NOTE — DISCHARGE PLACEMENT REQUEST
"To: [facility]    From: Ines JOHNSON [phone]        Fatimah Rivera (89 y.o. Female)       Date of Birth   06/20/1931    Social Security Number       Address   [address] [place] [address]    Home Phone   [phone]    MRN   [id number]       Cheondoism   Quaker    Marital Status                               Admission Date   1/19/24    Admission Type   Emergency    Admitting Provider   Gasper Scott MD    Attending Provider   Gasper Scott MD    Department, Room/Bed   [facility] 3A, 354/1       Discharge Date       Discharge Disposition       Discharge Destination                                 Attending Provider: Gasper Scott MD    Allergies: Penicillins    Isolation: None   Infection: None   Code Status: CPR    Ht: 154.9 cm (61\")   Wt: 47.9 kg (105 lb 8 oz)    Admission Cmt: None   Principal Problem: Pelvic fracture [S32.9XXA]                   Active Insurance as of 1/19/2024       Primary Coverage       Payor Plan Insurance Group Employer/Plan Group    MEDICARE MEDICARE A & B        Payor Plan Address Payor Plan Phone Number Payor Plan Fax Number Effective Dates    [address] [phone]  6/1/1996 - None Entered    [facility] [id number]         Subscriber Name Subscriber Birth Date Member ID       FATIMAH RIVERA 6/20/1931 [id number]               Secondary Coverage       Payor Plan Insurance Group Employer/Plan Group    [id number]       Payor Plan Address Payor Plan Phone Number Payor Plan Fax Number Effective Dates    [address] [address] [phone]  11/1/2015 - None Entered    [facility] [id number]         Subscriber Name Subscriber Birth Date Member ID       FATIMAH RIVERA 6/20/1931 [id number]                     Emergency Contacts        (Rel.) Home Phone Work Phone Mobile Phone    Wendy Solares (Daughter) [phone] -- --              Insurance Information                  MEDICARE/MEDICARE A & B Phone: [phone]    " Subscriber: Corinne Rivera Subscriber#: [id number]    Group#: -- Precert#: --        [facility] Phone: [phone]    Subscriber: Corinne Rivera Subscriber#: [id number]    Group#: [id number] Precert#: --

## 2024-01-22 NOTE — PLAN OF CARE
Goal Outcome Evaluation:  Plan of Care Reviewed With: patient        Progress: no change   Pt oriented to self. Pivot to chair and BSC today x1. Last BM today. Voiding-inct per pw. Pt c/o pain with urination-UA ordered by MD. LYNCH. PPP.

## 2024-01-22 NOTE — PLAN OF CARE
Goal Outcome Evaluation:  Plan of Care Reviewed With: patient        Progress: improving  Outcome Evaluation: PT tx completed. Pt sleeping, daughter present. Pt has no pn at rest. Increased L groin pn when mobilizing. Minimal assistance bed mobility, Thiago sit<>stand, pt holding to therapist forearms for foward ambulation. Few steps to recliner. Does not tolerate walking long distance due to pain. Due to dementia utilization of a walker was difficult for her to understand/manuever. Up in chair. Recommend inpatient rehab/SNF.   Pm treatment: Pt did much better utilizing r wx for walking. She ambulated about 10' Thiago, with cues for walker placement and gait mechanics. Sit up in chair a few hours and tolerates well.

## 2024-01-22 NOTE — PLAN OF CARE
Goal Outcome Evaluation:  Plan of Care Reviewed With: patient        Progress: no change  Outcome Evaluation: She is alert and oriented to name and  only. She is unable to answer any other questions correctly. Daughter at bedside. Saline lock intact to left forearm. Pure wick in use. She only c/o pain when moving her. She does not remember why she is here and is unable to reorient. She has rested well this shift. Call bell within reach. Safety maintained. Will continue to monitor.

## 2024-01-22 NOTE — CASE MANAGEMENT/SOCIAL WORK
Continued Stay Note   [place]     Patient Name: Corinne Rivera  MRN: [id number]  Today's Date: 1/22/2024    Admit Date: 1/19/2024    Plan: Referral to [facility] acute Rehab   Discharge Plan       Row Name 01/22/24 1349       Plan    Plan Referral to [facility] acute Rehab    Patient/Family in Agreement with Plan yes    Provided Post Acute Provider List? Yes    Post Acute Provider List Inpatient Rehab    Provided Post Acute Provider Quality & Resource List? Yes    Post Acute Provider Quality and Resource List Inpatient Rehab    Delivered To Support Person    Support Person Daughter - Wendy    Method of Delivery Telephone    Plan Comments SW spoke with patient's daughter regarding the option of acute rehab.  SNF placement will not be covered by Medicare at this time due to patient being admitted as observation.  Referral sent to [facility], will advise of their decision.                   Discharge Codes    No documentation.                       DIANNE BhandariW

## 2024-01-22 NOTE — THERAPY TREATMENT NOTE
Acute Care - Physical Therapy Treatment Note  [facility]     Patient Name: Corinne Rivera  : 1931  MRN: [id number]  Today's Date: 2024      Visit Dx:     ICD-10-CM ICD-9-CM   1. Ground-level fall  W18.30XA E888.9   2. Closed fracture of left pubis, unspecified portion of pubis, initial encounter  S32.502A 808.2   3. Ambulatory dysfunction  R26.2 719.7   4. Impaired mobility [Z74.09]  Z74.09 799.89     Patient Active Problem List   Diagnosis    Asymptomatic postmenopausal status    Hyponatremia    Hypertension    Acute metabolic encephalopathy    Fever    Thrombocytopenia    Elevated liver enzymes    Pelvic fracture     Past Medical History:   Diagnosis Date    Breast cancer     right     Hx of radiation therapy     Hypertension     Vitamin D deficiency      Past Surgical History:   Procedure Laterality Date    APPENDECTOMY      BREAST BIOPSY      BREAST LUMPECTOMY Right 1995    BREAST LUMPECTOMY Right     HYSTERECTOMY       PT Assessment (last 12 hours)       PT Evaluation and Treatment       Row Name 24 1315 24 1150       Physical Therapy Time and Intention    Subjective Information no complaints  at rest; increases when mobilizing  -KJ complains of;pain  with mobilizing, no pn at rest  -KJ    Document Type therapy note (daily note)  -KJ therapy note (daily note)  -KJ    Mode of Treatment physical therapy  -KJ physical therapy  -KJ    Patient Effort good  -KJ good  -KJ      Row Name 24 1315 24 1150       General Information    Existing Precautions/Restrictions fall  WBAT LLE  -KJ fall  WBAT LLE  -KJ      Row Name 24 131 24 1150       Pain    Pretreatment Pain Rating 0/10 - no pain  -KJ 0/10 - no pain  -KJ    Posttreatment Pain Rating 7/10  grimace  -KJ 7/10  -KJ    Pain Location - Side/Orientation Left  -KJ Left  -KJ    Pain Location - groin  -KJ groin  -KJ    Pre/Posttreatment Pain Comment pn only with mobilizing  -KJ pn only with moving/mobilizing  -KJ      Row  Name 01/22/24 1315 01/22/24 1150       Mobility    Extremity Weight-bearing Status -- --  -KJ    Left Lower Extremity (Weight-bearing Status) weight-bearing as tolerated (WBAT)  -KJ weight-bearing as tolerated (WBAT)  -KJ      Row Name 01/22/24 1315 01/22/24 1150       Bed Mobility    Supine-Sit [place] (Bed Mobility) -- verbal cues;minimum assist (75% patient effort);contact guard  pt does best if you let her do most of the transfer  -KJ    Comment, (Bed Mobility) kept up in chair for OT  -KJ --      Row Name 01/22/24 1315 01/22/24 1150       Sit-Stand Transfer    Sit-Stand [place] (Transfers) verbal cues;contact guard  -KJ verbal cues;minimum assist (75% patient effort)  -KJ    Assistive Device (Sit-Stand Transfers) walker, front-wheeled  -KJ --      Row Name 01/22/24 1315 01/22/24 1150       Gait/Stairs (Locomotion)    [place] Level (Gait) verbal cues;minimum assist (75% patient effort)  -KJ verbal cues;minimum assist (75% patient effort)  -KJ    Assistive Device (Gait) -- --  pt held to my forearms for steps to chair  -KJ    Distance in Feet (Gait) amb from door to bed approx 10' utilizing r wx.  -KJ 6-7 steps foward  -KJ    Pattern (Gait) step-to  -KJ step-to  -KJ    Deviations/Abnormal Patterns (Gait) antalgic;huang decreased;gait speed decreased;stride length decreased  -KJ antalgic;gait speed decreased;stride length decreased  -KJ    Comment, (Gait/Stairs) followed with recliner during gait. Did better utilizing r wx this afternoon for walking. Would take 1-2 steps and stop due to pain. Mod/Max cues for wx placement  -KJ --      Row Name 01/22/24 1315          Safety Issues, Functional Mobility    Safety Issues Affecting Function (Mobility) ability to follow commands;insight into deficits/self-awareness;problem-solving  -KJ       Row Name 01/22/24 1315 01/22/24 1150       Aerobic Exercise    Comment, Aerobic Exercise (Therapeutic Exercise) AROM BLE  -KJ A/AAROM BLE  -KJ      Row Name  01/22/24 1315 01/22/24 1150       Positioning and Restraints    Pre-Treatment Position sitting in chair/recliner  -KJ in bed  -KJ    Post Treatment Position chair  -KJ chair  -KJ    In Bed call light within reach  -KJ call light within reach  -KJ              User Key  (r) = Recorded By, (t) = Taken By, (c) = Cosigned By      Initials Name Provider Type    Desire Burgos, PTA Physical Therapist Assistant                    Physical Therapy Education       Title: PT OT SLP Therapies (In Progress)       Topic: Physical Therapy (In Progress)       Point: Mobility training (In Progress)       Learning Progress Summary             Patient Acceptance, E, NL by MS at 1/20/2024 0945    Comment: role of PT in her care                         Point: Home exercise program (Not Started)       Learner Progress:  Not documented in this visit.              Point: Body mechanics (Not Started)       Learner Progress:  Not documented in this visit.              Point: Precautions (In Progress)       Learning Progress Summary             Patient Acceptance, E, NL by MS at 1/20/2024 0945    Comment: role of PT in her care                                         User Key       Initials Effective Dates Name Provider Type Discipline    MS 07/11/23 -  Shivani Santos, PT, DPT, NCS Physical Therapist PT                  PT Recommendation and Plan     Plan of Care Reviewed With: patient  Progress: improving  Outcome Evaluation: PT tx completed. Pt sleeping, daughter present. Pt has no pn at rest. Increased L groin pn when mobilizing. Minimal assistance bed mobility, [place] sit<>stand, pt holding to therapist forearms for foward ambulation. Few steps to recliner. Does not tolerate walking long distance due to pain. Due to dementia utilization of a walker was difficult for her to understand/manuever. Up in chair. Recommend inpatient rehab/SNF.   Outcome Measures       Row Name 01/22/24 1300 01/21/24 1100          How much help from another  person do you currently need...    Turning from your back to your side while in flat bed without using bedrails? 3  -KJ 3  -KJ     Moving from lying on back to sitting on the side of a flat bed without bedrails? 3  -KJ 3  -KJ     Moving to and from a bed to a chair (including a wheelchair)? 3  -KJ 3  -KJ     Standing up from a chair using your arms (e.g., wheelchair, bedside chair)? 3  -KJ 2  -KJ     Climbing 3-5 steps with a railing? 2  -KJ 1  -KJ     To walk in hospital room? 2  -KJ 2  -KJ     AM-PAC 6 Clicks Score (PT) 16  -KJ 14  -KJ     Highest Level of Mobility Goal 5 --> Static standing  -KJ 4 --> Transfer to chair/commode  -KJ        Functional Assessment    Outcome Measure Options AM-PAC 6 Clicks Basic Mobility (PT)  -KJ AM-PAC 6 Clicks Basic Mobility (PT)  -KJ               User Key  (r) = Recorded By, (t) = Taken By, (c) = Cosigned By      Initials Name Provider Type    Desire Burgos PTA Physical Therapist Assistant                     Time Calculation:    PT Charges       Row Name 01/22/24 1403 01/22/24 1312          Time Calculation    Start Time 1315  -KJ 1150  -KJ     Stop Time 1338  -KJ 1207  -KJ     Time Calculation (min) 23 min  -KJ 17 min  -KJ     PT Received On -- 01/22/24  -KJ     PT Goal Re-Cert Due Date -- 01/30/24  -KJ        Time Calculation- PT    Total Timed Code Minutes- PT 23 minute(s)  -KJ 17 minute(s)  -KJ               User Key  (r) = Recorded By, (t) = Taken By, (c) = Cosigned By      Initials Name Provider Type    Desire Burgos PTA Physical Therapist Assistant                  Therapy Charges for Today       Code Description Service Date Service Provider Modifiers Qty    36541237256 HC PT THERAPEUTIC ACT EA 15 MIN 1/21/2024 Desire Diaz, PTA GP 1    59549056753 HC GAIT TRAINING EA 15 MIN 1/21/2024 Desire Diaz, PTA GP 1    35583400199 HC PT THER PROC EA 15 MIN 1/21/2024 Desire Diaz, PTA GP 1    84823278767 HC PT THERAPEUTIC ACT EA 15 MIN 1/22/2024 Desire Diaz  SUN, PTA GP 1    28810664540 HC PT THER PROC EA 15 MIN 1/22/2024 Desire Diaz, PTA GP 1    78365289890 HC PT THERAPEUTIC ACT EA 15 MIN 1/22/2024 Desire Diaz, PTA GP 1            PT G-Codes  Outcome Measure Options: AM-PAC 6 Clicks Basic Mobility (PT)  AM-PAC 6 Clicks Score (PT): 16  AM-PAC 6 Clicks Score (OT): 16    Desire Diaz PTA  1/22/2024

## 2024-01-23 PROCEDURE — 97535 SELF CARE MNGMENT TRAINING: CPT

## 2024-01-23 PROCEDURE — 97116 GAIT TRAINING THERAPY: CPT

## 2024-01-23 PROCEDURE — 25010000002 CEFTRIAXONE PER 250 MG: Performed by: INTERNAL MEDICINE

## 2024-01-23 PROCEDURE — 97530 THERAPEUTIC ACTIVITIES: CPT

## 2024-01-23 RX ORDER — TRAZODONE HYDROCHLORIDE 50 MG/1
25 TABLET ORAL NIGHTLY
Status: DISCONTINUED | OUTPATIENT
Start: 2024-01-23 | End: 2024-01-24

## 2024-01-23 RX ADMIN — ACETAMINOPHEN 650 MG: 325 TABLET, FILM COATED ORAL at 08:27

## 2024-01-23 RX ADMIN — TRAZODONE HYDROCHLORIDE 25 MG: 50 TABLET ORAL at 20:51

## 2024-01-23 RX ADMIN — CEFTRIAXONE 1000 MG: 1 INJECTION, POWDER, FOR SOLUTION INTRAMUSCULAR; INTRAVENOUS at 20:50

## 2024-01-23 RX ADMIN — Medication 10 ML: at 08:24

## 2024-01-23 RX ADMIN — Medication 10 ML: at 20:50

## 2024-01-23 RX ADMIN — CALCIUM 1000 MG: 500 TABLET ORAL at 08:20

## 2024-01-23 NOTE — PROGRESS NOTES
Chief Complaint: Hip pain      Interval History:     Daughter states that she was very agitated last night and Wanting to get up and use the bathroom.  Requesting some medication tonight to help her rest.  Likely sundowning from her dementia.  He was able to do some therapy yesterday and get up and sit in the chair.  She has not been requiring any pain medication and is pain-free laying in the bed.  CT of the pelvis in the ER showed a left pubic symphysis and left pubic ramus fracture.    Review of Systems:   General ROS: negative for - chills or fever  Respiratory ROS: no cough, shortness of breath, or wheezing  Cardiovascular ROS: no chest pain or dyspnea on exertion  Gastrointestinal ROS: negative for - abdominal pain, diarrhea or nausea/vomiting       Vital Signs  Temp:  [97.6 °F (36.4 °C)-98.3 °F (36.8 °C)] 98.3 °F (36.8 °C)  Heart Rate:  [66-85] 85  Resp:  [14-18] 18  BP: (145-182)/(73-94) 152/80    Intake/Output Summary (Last 24 hours) at 1/23/2024 0744  Last data filed at 1/23/2024 0349  Gross per 24 hour   Intake 360 ml   Output 1000 ml   Net -640 ml       Physical Exam:     General Appearance:    Alert, cooperative, in no acute distress   Head:    N/A   Throat:   N/A   Neck:   N/A   Lungs:     Clear to auscultation,respirations regular, even and                  unlabored    Heart:    Regular rhythm and normal rate, normal S1 and S2, no            murmur, no gallop, no rub   Abdomen:     Normal bowel sounds, no masses, no organomegaly, soft        non-tender, non-distended, no guarding, no rebound                tenderness   Extremities:   No edema, no cyanosis, no clubbing   Pulses:   N/A   Skin:   N/A   Lymph nodes:   N/A   Neurologic:   N/A          Lab Review:       Lab Results (last 24 hours)       Procedure Component Value Units Date/Time    Urinalysis With Culture If Indicated - Urine, Clean Catch [[id number]]  (Abnormal) Collected: 01/22/24 7192    Specimen: Urine, Clean Catch Updated: 01/22/24  1710     Color, UA Yellow     Appearance, UA Cloudy     pH, UA 6.5     Specific Gravity, UA 1.012     Glucose, UA Negative     Ketones, UA Negative     Bilirubin, UA Negative     Blood, UA Small (1+)     Protein, UA Negative     Leuk Esterase, UA Large (3+)     Nitrite, UA Negative     Urobilinogen, UA 0.2 E.U./dL    Narrative:      In absence of clinical symptoms, the presence of pyuria, bacteria, and/or nitrites on the urinalysis result does not correlate with infection.    Urinalysis, Microscopic Only - Urine, Clean Catch [[id number]]  (Abnormal) Collected: 01/22/24 1649    Specimen: Urine, Clean Catch Updated: 01/22/24 1710     RBC, UA 3-5 /HPF      WBC, UA 11-20 /HPF      Bacteria, UA 3+ /HPF      Squamous Epithelial Cells, UA None Seen /HPF      Hyaline Casts, UA None Seen /LPF      Methodology Manual Light Microscopy    Urine Culture - Urine, Urine, Clean Catch [[id number]] Collected: 01/22/24 1649    Specimen: Urine, Clean Catch Updated: 01/22/24 1710    Osmolality, Urine - Urine, Clean Catch [[id number]]  (Normal) Collected: 01/22/24 0942    Specimen: Urine, Clean Catch Updated: 01/22/24 1121     Osmolality, Urine 917 mOsm/kg     Osmolality, Serum [[id number]]  (Normal) Collected: 01/22/24 0831    Specimen: Blood Updated: 01/22/24 1118     Osmolality 284 mOsm/kg     Comprehensive Metabolic Panel [[id number]]  (Abnormal) Collected: 01/22/24 0831    Specimen: Blood Updated: 01/22/24 1033     Glucose 95 mg/dL      BUN 16 mg/dL      Creatinine 0.49 mg/dL      Sodium 132 mmol/L      Potassium 4.3 mmol/L      Chloride 95 mmol/L      CO2 28.0 mmol/L      Calcium 8.5 mg/dL      Total Protein 6.9 g/dL      Albumin 3.3 g/dL      ALT (SGPT) 8 U/L      AST (SGOT) 15 U/L      Alkaline Phosphatase 63 U/L      Total Bilirubin 0.8 mg/dL      Globulin 3.6 gm/dL      A/G Ratio 0.9 g/dL      BUN/Creatinine Ratio 32.7     Anion Gap 9.0 mmol/L      eGFR 88.6 mL/min/1.73     Narrative:      GFR Normal >60  Chronic Kidney Disease  "<60  Kidney Failure <15    The GFR formula is only valid for adults with stable renal function between ages 18 and 70.    Sodium, Urine, Random - Urine, Clean Catch [[id number]] Collected: 01/22/24 0942    Specimen: Urine, Clean Catch Updated: 01/22/24 1013     Sodium, Urine 61 mmol/L     Narrative:      Reference intervals for random urine have not been established.  Clinical usage is dependent upon physician's interpretation in combination with other laboratory tests.       CBC & Differential [[id number]]  (Abnormal) Collected: 01/22/24 0831    Specimen: Blood Updated: 01/22/24 1002    Narrative:      The following orders were created for panel order CBC & Differential.  Procedure                               Abnormality         Status                     ---------                               -----------         ------                     CBC Auto Differential[[id number]]        Abnormal            Final result                 Please view results for these tests on the individual orders.    CBC Auto Differential [[id number]]  (Abnormal) Collected: 01/22/24 0831    Specimen: Blood Updated: 01/22/24 1002     WBC 6.80 10*3/mm3      RBC 4.66 10*6/mm3      Hemoglobin 14.2 g/dL      Hematocrit 42.7 %      MCV 91.6 fL      MCH 30.5 pg      MCHC 33.3 g/dL      RDW 13.9 %      RDW-SD 46.8 fl      MPV 10.3 fL      Platelets 239 10*3/mm3      Neutrophil % 67.5 %      Lymphocyte % 16.6 %      Monocyte % 12.4 %      Eosinophil % 2.5 %      Basophil % 0.7 %      Immature Grans % 0.3 %      Neutrophils, Absolute 4.59 10*3/mm3      Lymphocytes, Absolute 1.13 10*3/mm3      Monocytes, Absolute 0.84 10*3/mm3      Eosinophils, Absolute 0.17 10*3/mm3      Basophils, Absolute 0.05 10*3/mm3      Immature Grans, Absolute 0.02 10*3/mm3      nRBC 0.0 /100 WBC           Cultures:    No results found for: \"BLOODCX\", \"URINECX\", \"WOUNDCX\", \"MRSACX\", \"RESPCX\", \"STOOLCX\"    Radiology Review:  Imaging Results (Last 24 Hours)       ** No results " found for the last 24 hours. **                     ASSESSMENT:      Pelvic fracture    Hyponatremia    Hypertension      PLAN:    1.  Continue with physical therapy.  Pain medication as needed.  2.  Plan for placement in an acute patient rehab.  Social work following.  3.  Hyponatremia stable.  Continue to monitor.  4.  Urinalysis consistent with UTI.  Will treat with antibiotics.    Further orders per Dr. Scott.      Azael Zhao, APRN  01/23/24  07:44 CST        Part of this note may be an electronic transcription/translation of spoken language to printed text using the Dragon Dictation System.

## 2024-01-23 NOTE — PLAN OF CARE
Goal Outcome Evaluation:  Plan of Care Reviewed With: caregiver        Progress: no change  Outcome Evaluation: Ntn assessment completed. Oral intake 43% of four meals. Nursing reports pt is tolerating current diet texture. MIghty shakes BID. Con to follow for plan of care.

## 2024-01-23 NOTE — CASE MANAGEMENT/SOCIAL WORK
Continued Stay Note  [facility]     Patient Name: Corinne Rivera  MRN: [id number]  Today's Date: 1/23/2024    Admit Date: 1/19/2024    Plan: Pending   Discharge Plan       Row Name 01/23/24 1004       Plan    Plan Pending    Plan Comments Patient's status is now inpatient.  SW informed patient's daughter and she has requested a referral to [facility] in [place].  Referral made, awaiting response.      Row Name 01/23/24 0818       Plan    Plan Pending    Plan Comments [facility] has denied advising acute rehab is not the appropriate level of care.  However, patient is observation status and SNF is not an option.  [facility]                   Discharge Codes    No documentation.                       KELSEY Bhandari

## 2024-01-23 NOTE — PLAN OF CARE
Goal Outcome Evaluation:  Plan of Care Reviewed With: patient, daughter        Progress: no change     Oriented to self only. IV antibiotic.LYNCH.VSS. Safety maintained.

## 2024-01-23 NOTE — PLAN OF CARE
Goal Outcome Evaluation:  Pt is alert and oriented to self and daughter, no c/o pain unless trying to bear weight, vitals are stable, no s/s of distress, room air, IV is clear dry and intact, meds are to be crushed in applesauce, daughter remains at bedside           Progress: improving

## 2024-01-23 NOTE — THERAPY TREATMENT NOTE
Acute Care - Physical Therapy Treatment Note  [facility]     Patient Name: Corinne Rivera  : 1931  MRN: [id number]  Today's Date: 2024      Visit Dx:     ICD-10-CM ICD-9-CM   1. Ground-level fall  W18.30XA E888.9   2. Closed fracture of left pubis, unspecified portion of pubis, initial encounter  S32.502A 808.2   3. Ambulatory dysfunction  R26.2 719.7   4. Impaired mobility [Z74.09]  Z74.09 799.89     Patient Active Problem List   Diagnosis    Asymptomatic postmenopausal status    Hyponatremia    Hypertension    Acute metabolic encephalopathy    Fever    Thrombocytopenia    Elevated liver enzymes    Pelvic fracture     Past Medical History:   Diagnosis Date    Breast cancer     right     Hx of radiation therapy     Hypertension     Vitamin D deficiency      Past Surgical History:   Procedure Laterality Date    APPENDECTOMY      BREAST BIOPSY      BREAST LUMPECTOMY Right 1995    BREAST LUMPECTOMY Right     HYSTERECTOMY       PT Assessment (last 12 hours)       PT Evaluation and Treatment       Row Name 24 1024          Physical Therapy Time and Intention    Subjective Information complains of;pain  -AE     Document Type therapy note (daily note)  -AE     Mode of Treatment physical therapy  -AE       Row Name 24 1024          General Information    Existing Precautions/Restrictions fall  WBAT  -AE       Row Name 24 1024          Pain    Pretreatment Pain Rating 0/10 - no pain  -AE     Posttreatment Pain Rating 3/10  FACES  -AE     Pain Location - Side/Orientation Left  -AE     Pain Location lower  -AE     Pain Location - groin  -AE     Pain Intervention(s) Medication (See MAR)  TYLENOL  -AE       Row Name 24 1024          Bed Mobility    Supine-Sit [place] (Bed Mobility) contact guard;verbal cues  -AE     Assistive Device (Bed Mobility) head of bed elevated  -AE       Row Name 24 1024          Transfers    Transfers toilet transfer  bsc  -AE     Comment, (Transfers)  --  min x1 with RW to take steps to BSC  -AE       Row Name 01/23/24 1024          Sit-Stand Transfer    Sit-Stand [place] (Transfers) minimum assist (75% patient effort);verbal cues  -AE       Row Name 01/23/24 1024          Gait/Stairs (Locomotion)    [place] Level (Gait) contact guard  -AE     Assistive Device (Gait) walker, front-wheeled  -AE     Distance in Feet (Gait) 30  -AE     Deviations/Abnormal Patterns (Gait) antalgic;huang decreased  -AE       Row Name 01/23/24 1024          Positioning and Restraints    Pre-Treatment Position in bed  -AE     Post Treatment Position chair  -AE     In Chair sitting;call light within reach;exit alarm on;with family/caregiver  -AE               User Key  (r) = Recorded By, (t) = Taken By, (c) = Cosigned By      Initials Name Provider Type    AE Patti Pereira, PTA Physical Therapist Assistant                    Physical Therapy Education       Title: PT OT SLP Therapies (In Progress)       Topic: Physical Therapy (In Progress)       Point: Mobility training (In Progress)       Learning Progress Summary             Patient Acceptance, E, VU by AE at 1/23/2024 1102    Comment: t/f's    Acceptance, E, NR by  at 1/22/2024 2243    Acceptance, E, NL by MS at 1/20/2024 0945    Comment: role of PT in her care   Significant Other Acceptance, E, NR by  at 1/22/2024 2243                         Point: Home exercise program (In Progress)       Learning Progress Summary             Patient Acceptance, E, NR by MK at 1/22/2024 2243   Significant Other Acceptance, E, NR by  at 1/22/2024 2243                         Point: Body mechanics (In Progress)       Learning Progress Summary             Patient Acceptance, E, NR by  at 1/22/2024 2243   Significant Other Acceptance, E, NR by MK at 1/22/2024 2243                         Point: Precautions (In Progress)       Learning Progress Summary             Patient Acceptance, E, NR by  at 1/22/2024 2243    Acceptance, E,  NL by MS at 1/20/2024 0945    Comment: role of PT in her care   Significant Other Acceptance, E, NR by MK at 1/22/2024 0214                                         User Key       Initials Effective Dates Name Provider Type Discipline    AE 02/03/23 -  Patti Pereira, PTA Physical Therapist Assistant PT    MS 07/11/23 -  Shivani Santos, PT, DPT, NCS Physical Therapist PT     04/25/23 -  Shravan Larsen, RN Registered Nurse Nurse                  PT Recommendation and Plan     Plan of Care Reviewed With: patient  Progress: improving  Outcome Evaluation: Pt was cga for supine to sit and grimaced in pain with movement. Pt with vc's was min x1 to stand and take steps to BSC. Pt voided and completed hygiene Independently.Pt was min x1 to stand and was able to walk 30ft with RW to door with antalgic gait.Pt progressing and following commands well and would benefit from continued PT at inpatient facility.   Outcome Measures       Row Name 01/23/24 1100 01/22/24 1300 01/21/24 1100       How much help from another person do you currently need...    Turning from your back to your side while in flat bed without using bedrails? 3  -AE 3  -KJ 3  -KJ    Moving from lying on back to sitting on the side of a flat bed without bedrails? 3  -AE 3  -KJ 3  -KJ    Moving to and from a bed to a chair (including a wheelchair)? 3  -AE 3  -KJ 3  -KJ    Standing up from a chair using your arms (e.g., wheelchair, bedside chair)? 3  -AE 3  -KJ 2  -KJ    Climbing 3-5 steps with a railing? 2  -AE 2  -KJ 1  -KJ    To walk in hospital room? 2  -AE 2  -KJ 2  -KJ    AM-PAC 6 Clicks Score (PT) 16  -AE 16  -KJ 14  -KJ    Highest Level of Mobility Goal 5 --> Static standing  -AE 5 --> Static standing  -KJ 4 --> Transfer to chair/commode  -KJ       Functional Assessment    Outcome Measure Options AM-PAC 6 Clicks Basic Mobility (PT)  -AE AM-PAC 6 Clicks Basic Mobility (PT)  -KJ AM-PAC 6 Clicks Basic Mobility (PT)  -KJ              User Key  (r) = Recorded  By, (t) = Taken By, (c) = Cosigned By      Initials Name Provider Type    AE Patti Pereira PTA Physical Therapist Assistant    Desire Burgos, ELYSSA Physical Therapist Assistant                     Time Calculation:    PT Charges       Row Name 01/23/24 1102             Time Calculation    Start Time 1024  -AE      Stop Time 1052  -AE      Time Calculation (min) 28 min  -AE      PT Received On 01/23/24  -AE      PT Goal Re-Cert Due Date 01/30/24  -AE         Time Calculation- PT    Total Timed Code Minutes- PT 28 minute(s)  -AE         Timed Charges    02174 - Gait Training Minutes  20  -AE      23144 - PT Therapeutic Activity Minutes 8  -AE         Total Minutes    Timed Charges Total Minutes 28  -AE       Total Minutes 28  -AE                User Key  (r) = Recorded By, (t) = Taken By, (c) = Cosigned By      Initials Name Provider Type    AE Patti Pereira PTA Physical Therapist Assistant                  Therapy Charges for Today       Code Description Service Date Service Provider Modifiers Qty    31886806179 HC GAIT TRAINING EA 15 MIN 1/23/2024 Patti Pereira PTA GP 1    16996454301 HC PT THERAPEUTIC ACT EA 15 MIN 1/23/2024 Patti Pereira PTA GP 1            PT G-Codes  Outcome Measure Options: AM-PAC 6 Clicks Basic Mobility (PT)  AM-PAC 6 Clicks Score (PT): 16  AM-PAC 6 Clicks Score (OT): 16    Patti Pereira PTA  1/23/2024

## 2024-01-23 NOTE — DISCHARGE PLACEMENT REQUEST
"To: [facility]    From: Ines JOHNSON [phone]        Fatimah Rivera (89 y.o. Female)       Date of Birth   06/20/1931    Social Security Number       Address   [address] [place] [id number]    Home Phone   [phone]    MRN   [id number]       [place]   [employer]    Marital Status                               Admission Date   1/19/24    Admission Type   Emergency    Admitting Provider   Gasper Scott MD    Attending Provider   Gasper Scott MD    Department, Room/Bed   [facility] 3A, 354/1       Discharge Date       Discharge Disposition       Discharge Destination                                 Attending Provider: Gasper Scott MD    Allergies: Penicillins    Isolation: None   Infection: None   Code Status: CPR    Ht: 154.9 cm (61\")   Wt: 47.9 kg (105 lb 8 oz)    Admission Cmt: None   Principal Problem: Pelvic fracture [S32.9XXA]                   Active Insurance as of 1/19/2024       Primary Coverage       Payor Plan Insurance Group Employer/Plan Group    MEDICARE MEDICARE A & B        Payor Plan Address Payor Plan Phone Number Payor Plan Fax Number Effective Dates    [address] [phone]  6/1/1996 - None Entered    [facility] [id number]         Subscriber Name Subscriber Birth Date Member ID       FATIMAH RIVERA 6/20/1931 [id number]               Secondary Coverage       Payor Plan Insurance Group Employer/Plan Group    [facility] [id number]       Payor Plan Address Payor Plan Phone Number Payor Plan Fax Number Effective Dates    [address] [address] [phone]  11/1/2015 - None Entered    [facility] [id number]         Subscriber Name Subscriber Birth Date Member ID       FATIMAH RIVERA 6/20/1931 [id number]                     Emergency Contacts        (Rel.) Home Phone Work Phone Mobile Phone    Wendy Solares (Daughter) [phone] -- --                 History & Physical        [place] MD at 01/20/24 1141            "   Patient Care Team:  Gasper Scott MD as PCP - General  Gasper Scott MD as PCP - Family Medicine    Chief complaint pain in hip    Subjective    Patient is a 89 y.o. female presents with pain in hip 1 day after a fall at home. Onset of symptoms was abrupt starting 2 days ago.  Symptoms are associated with pain with ambulation.  Symptoms are aggravated by weightbearing.   Symptoms improve with nothing at home prior to arrival. Severity moderate to severe.  Context of pain related to fall at home.  Patient's daughter who was in the room with her witnessed the fall.  States she turned quickly removing something from the couch and felt on the left side.  Had pain moderately but was comfortable while seated in her recliner.  Did not wish to go to the ER at that time so was watched home overnight.  Following morning he had continued pain at which time daughter convinced her to go to the emergency room for x-rays.  [facility] pain more on the left side of hip.  Patient's daughter also mentions that she does get anxious at night and did so overnight or hospitalized.  Most of history gleaned from daughters information.    Review of Systems   Pertinent items are noted in HPI, all other systems reviewed and negative    History  Past Medical History:   Diagnosis Date    Breast cancer 1995    right     Hx of radiation therapy     Hypertension     Vitamin D deficiency      Past Surgical History:   Procedure Laterality Date    APPENDECTOMY      BREAST BIOPSY      BREAST LUMPECTOMY Right 1995    BREAST LUMPECTOMY Right     HYSTERECTOMY       Family History   Problem Relation Age of Onset    Breast cancer Mother     No Known Problems Father     No Known Problems Sister     No Known Problems Brother     No Known Problems Daughter     No Known Problems Son     No Known Problems Maternal Grandmother     No Known Problems Paternal Grandmother     No Known Problems Maternal Aunt     No Known Problems Paternal Aunt      No Known Problems Other     BRCA 1/2 Neg Hx     Colon cancer Neg Hx     Endometrial cancer Neg Hx     Ovarian cancer Neg Hx      Social History     Tobacco Use    Smoking status: Never    Smokeless tobacco: Never   Vaping Use    Vaping Use: Never used   Substance Use Topics    Alcohol use: No    Drug use: No     E-cigarette/Vaping    E-cigarette/Vaping Use Never User      E-cigarette/Vaping Substances     Medications Prior to Admission   Medication Sig Dispense Refill Last Dose    Calcium Carbonate 1500 (600 Ca) MG tablet    1/18/2024    vitamin D (ERGOCALCIFEROL) 03785 UNITS capsule capsule Take 1 capsule by mouth Every 14 (Fourteen) Days.   Past Week    lisinopril (PRINIVIL,ZESTRIL) 2.5 MG tablet Take 2.5 mg by mouth Daily. (Patient not taking: Reported on 1/19/2024)   Not Taking     Allergies:  Penicillins    Objective    Vital Signs  Temp:  [97.5 °F (36.4 °C)-98 °F (36.7 °C)] 97.5 °F (36.4 °C)  Heart Rate:  [] 99  Resp:  [16-18] 18  BP: (136-159)/(88-95) 136/88    Physical Exam:    General Appearance: alert, appears stated age, cooperative, and seated in recliner  Head: normocephalic, without obvious abnormality and atraumatic  Eyes: lids and lashes normal, conjunctivae and sclerae normal, and no icterus  Neck: supple and trachea midline  Lungs: clear to auscultation, respirations regular, respirations even, and respirations unlabored  Heart: regular rhythm & normal rate and normal S1, S2  Abdomen: normal bowel sounds and soft non-tender  Extremities: moves extremities well, no edema, no cyanosis, and no redness  Skin: turgor normal  Neurologic: Mental Status alertness awake    Results Review:    I reviewed the patient's new clinical results.    Assessment & Plan      Pelvic fracture    Hyponatremia    Hypertension      Mechanical fall at home resulting in left pubic rami and symphysis fracture as well as questionable left sacral alar fracture.  Generally speaking these are nonoperative stable fractures  that require healing with weightbearing as tolerated.  Will ask physical therapy to evaluate and see how she does as far as weightbearing.  Also social service consult per patient's daughter help with disposition planning.  She is asking for something as needed for anxiousness, particularly at night.  Will try something as nonbenzodiazepine at a very low dose to see if that gives her any relief.  Disposition plan to be determined    I discussed the patients findings and my recommendations with patient and family.     Brando Milner MD  01/20/24  11:41 CST    Time:  In excess of 40 minutes        Electronically signed by Brando Milner MD at 01/20/24 1147       Current Facility-Administered Medications   Medication Dose Route Frequency Provider Last Rate Last Admin    acetaminophen (TYLENOL) tablet 650 mg  650 mg Oral Q4H PRN Gasper Scott MD   650 mg at 01/23/24 0827    sennosides-docusate (PERICOLACE) 8.6-50 MG per tablet 2 tablet  2 tablet Oral BID Gasper Scott MD   2 tablet at 01/22/24 0803    And    polyethylene glycol (MIRALAX) packet 17 g  17 g Oral Daily PRN Gasper Scott MD        And    bisacodyl (DULCOLAX) EC tablet 5 mg  5 mg Oral Daily PRN Gasper Scott MD        And    bisacodyl (DULCOLAX) suppository 10 mg  10 mg Rectal Daily PRN Gasper Scott MD        calcium carbonate (oyster shell) tablet 1,000 mg  1,000 mg Oral Daily Brando Milner MD   1,000 mg at 01/23/24 0820    cefTRIAXone (ROCEPHIN) 1,000 mg in sodium chloride 0.9 % 100 mL IVPB  1,000 mg Intravenous Q24H Gasper Scott  mL/hr at 01/22/24 2130 1,000 mg at 01/22/24 2130    naloxone (NARCAN) injection 0.4 mg  0.4 mg Intravenous Q5 Min PRN Gasper Scott MD        ondansetron (ZOFRAN) injection 4 mg  4 mg Intravenous Once Doc Mari MD        ondansetron ODT (ZOFRAN-ODT) disintegrating tablet 4 mg  4 mg Oral Q6H PRN Gasper Scott, MD        Or     ondansetron (ZOFRAN) injection 4 mg  4 mg Intravenous Q6H PRN Gasper Scott MD        sodium chloride 0.9 % flush 10 mL  10 mL Intravenous PRN Philipp Finn APRN        sodium chloride 0.9 % flush 10 mL  10 mL Intravenous Q12H Gasper Scott MD   10 mL at 24 0824    sodium chloride 0.9 % flush 10 mL  10 mL Intravenous PRN Gasper Scott MD        sodium chloride 0.9 % infusion 40 mL  40 mL Intravenous PRN Gasper Scott MD            Physical Therapy Notes (most recent note)        Desire Diaz, PTA at 24 1404  Version 1 of 1         Acute Care - Physical Therapy Treatment Note   [place]     Patient Name: Corinne Rivera  : 1931  MRN: [id number]  Today's Date: 2024      Visit Dx:     ICD-10-CM ICD-9-CM   1. Ground-level fall  W18.30XA E888.9   2. Closed fracture of left pubis, unspecified portion of pubis, initial encounter  S32.502A 808.2   3. Ambulatory dysfunction  R26.2 719.7   4. Impaired mobility [Z74.09]  Z74.09 799.89     Patient Active Problem List   Diagnosis    Asymptomatic postmenopausal status    Hyponatremia    Hypertension    Acute metabolic encephalopathy    Fever    Thrombocytopenia    Elevated liver enzymes    Pelvic fracture     Past Medical History:   Diagnosis Date    Breast cancer     right     Hx of radiation therapy     Hypertension     Vitamin D deficiency      Past Surgical History:   Procedure Laterality Date    APPENDECTOMY      BREAST BIOPSY      BREAST LUMPECTOMY Right 1995    BREAST LUMPECTOMY Right     HYSTERECTOMY       PT Assessment (last 12 hours)       PT Evaluation and Treatment       Row Name 24 1315 24 1150       Physical Therapy Time and Intention    Subjective Information no complaints  at rest; increases when mobilizing  -KJ complains of;pain  with mobilizing, no pn at rest  -KJ    Document Type therapy note (daily note)  -KJ therapy note (daily note)  -KJ    Mode of Treatment physical therapy  -KJ  physical therapy  -KJ    Patient Effort good  -KJ good  -KJ      Row Name 01/22/24 1315 01/22/24 1150       General Information    Existing Precautions/Restrictions fall  WBAT LLE  -KJ fall  WBAT LLE  -KJ      Row Name 01/22/24 1315 01/22/24 1150       Pain    Pretreatment Pain Rating 0/10 - no pain  -KJ 0/10 - no pain  -KJ    Posttreatment Pain Rating 7/10  grimace  -KJ 7/10  -KJ    Pain Location - Side/Orientation Left  -KJ Left  -KJ    Pain Location - groin  -KJ groin  -KJ    Pre/Posttreatment Pain Comment pn only with mobilizing  -KJ pn only with moving/mobilizing  -KJ      Row Name 01/22/24 1315 01/22/24 1150       Mobility    Extremity Weight-bearing Status -- --  -KJ    Left Lower Extremity (Weight-bearing Status) weight-bearing as tolerated (WBAT)  -KJ weight-bearing as tolerated (WBAT)  -KJ      Row Name 01/22/24 1315 01/22/24 1150       Bed Mobility    Supine-Sit [place] (Bed Mobility) -- verbal cues;minimum assist (75% patient effort);contact guard  pt does best if you let her do most of the transfer  -KJ    Comment, (Bed Mobility) kept up in chair for OT  -KJ --      Row Name 01/22/24 1315 01/22/24 1150       Sit-Stand Transfer    Sit-Stand [place] (Transfers) verbal cues;contact guard  -KJ verbal cues;minimum assist (75% patient effort)  -KJ    Assistive Device (Sit-Stand Transfers) walker, front-wheeled  -KJ --      Row Name 01/22/24 1315 01/22/24 1150       Gait/Stairs (Locomotion)    [place] Level (Gait) verbal cues;minimum assist (75% patient effort)  -KJ verbal cues;minimum assist (75% patient effort)  -KJ    Assistive Device (Gait) -- --  pt held to my forearms for steps to chair  -KJ    Distance in Feet (Gait) amb from door to bed approx 10' utilizing r wx.  -KJ 6-7 steps foward  -KJ    Pattern (Gait) step-to  -KJ step-to  -KJ    Deviations/Abnormal Patterns (Gait) antalgic;huang decreased;gait speed decreased;stride length decreased  -KJ antalgic;gait speed decreased;stride  length decreased  -KJ    Comment, (Gait/Stairs) followed with recliner during gait. Did better utilizing r wx this afternoon for walking. Would take 1-2 steps and stop due to pain. Mod/Max cues for wx placement  -KJ --      Row Name 01/22/24 1315          Safety Issues, Functional Mobility    Safety Issues Affecting Function (Mobility) ability to follow commands;insight into deficits/self-awareness;problem-solving  -KJ       Row Name 01/22/24 1315 01/22/24 1150       Aerobic Exercise    Comment, Aerobic Exercise (Therapeutic Exercise) AROM BLE  -KJ A/AAROM BLE  -KJ      Row Name 01/22/24 1315 01/22/24 1150       Positioning and Restraints    Pre-Treatment Position sitting in chair/recliner  -KJ in bed  -KJ    Post Treatment Position chair  -KJ chair  -KJ    In Bed call light within reach  -KJ call light within reach  -KJ              User Key  (r) = Recorded By, (t) = Taken By, (c) = Cosigned By      Initials Name Provider Type    Desire Burgos, PTA Physical Therapist Assistant                    Physical Therapy Education       Title: PT OT SLP Therapies (In Progress)       Topic: Physical Therapy (In Progress)       Point: Mobility training (In Progress)       Learning Progress Summary             Patient Acceptance, E, NL by MS at 1/20/2024 0945    Comment: role of PT in her care                         Point: Home exercise program (Not Started)       Learner Progress:  Not documented in this visit.              Point: Body mechanics (Not Started)       Learner Progress:  Not documented in this visit.              Point: Precautions (In Progress)       Learning Progress Summary             Patient Acceptance, E, NL by MS at 1/20/2024 0945    Comment: role of PT in her care                                         User Key       Initials Effective Dates Name Provider Type Discipline    MS 07/11/23 -  Shivani Santos, PT, DPT, NCS Physical Therapist PT                  PT Recommendation and Plan     Plan of  Care Reviewed With: patient  Progress: improving  Outcome Evaluation: PT tx completed. Pt sleeping, daughter present. Pt has no pn at rest. Increased L groin pn when mobilizing. Minimal assistance bed mobility, [place] sit<>stand, pt holding to therapist forearms for foward ambulation. Few steps to recliner. Does not tolerate walking long distance due to pain. Due to dementia utilization of a walker was difficult for her to understand/manuever. Up in chair. Recommend inpatient rehab/SNF.   Outcome Measures       Row Name 01/22/24 1300 01/21/24 1100          How much help from another person do you currently need...    Turning from your back to your side while in flat bed without using bedrails? 3  -KJ 3  -KJ     Moving from lying on back to sitting on the side of a flat bed without bedrails? 3  -KJ 3  -KJ     Moving to and from a bed to a chair (including a wheelchair)? 3  -KJ 3  -KJ     Standing up from a chair using your arms (e.g., wheelchair, bedside chair)? 3  -KJ 2  -KJ     Climbing 3-5 steps with a railing? 2  -KJ 1  -KJ     To walk in hospital room? 2  -KJ 2  -KJ     AM-PAC 6 Clicks Score (PT) 16  -KJ 14  -KJ     Highest Level of Mobility Goal 5 --> Static standing  -KJ 4 --> Transfer to chair/commode  -KJ        Functional Assessment    Outcome Measure Options AM-PAC 6 Clicks Basic Mobility (PT)  -KJ AM-PAC 6 Clicks Basic Mobility (PT)  -KJ               User Key  (r) = Recorded By, (t) = Taken By, (c) = Cosigned By      Initials Name Provider Type    Desire Burgos, PTA Physical Therapist Assistant                     Time Calculation:    PT Charges       Row Name 01/22/24 1403 01/22/24 1312          Time Calculation    Start Time 1315  -KJ 1150  -KJ     Stop Time 1338  -KJ 1207  -KJ     Time Calculation (min) 23 min  -KJ 17 min  -KJ     PT Received On -- 01/22/24  -KJ     PT Goal Re-Cert Due Date -- 01/30/24  -KJ        Time Calculation- PT    Total Timed Code Minutes- PT 23 minute(s)  -KJ 17 minute(s)   -CRISTIANE               User Key  (r) = Recorded By, (t) = Taken By, (c) = Cosigned By      Initials Name Provider Type    Desire Burgos PTA Physical Therapist Assistant                  Therapy Charges for Today       Code Description Service Date Service Provider Modifiers Qty    77924985962 HC PT THERAPEUTIC ACT EA 15 MIN 2024 Desire Diaz, PTA GP 1    10320726501 HC GAIT TRAINING EA 15 MIN 2024 Desire Diaz, PTA GP 1    38859379476 HC PT THER PROC EA 15 MIN 2024 Desire Diaz, PTA GP 1    64065307357 HC PT THERAPEUTIC ACT EA 15 MIN 2024 Desire Diaz, PTA GP 1    90712682447 HC PT THER PROC EA 15 MIN 2024 Desire Diaz, PTA GP 1    93979306221 HC PT THERAPEUTIC ACT EA 15 MIN 2024 Desire Diaz, PTA GP 1            PT G-Codes  Outcome Measure Options: AM-PAC 6 Clicks Basic Mobility (PT)  AM-PAC 6 Clicks Score (PT): 16  AM-PAC 6 Clicks Score (OT): 16    Desire Diaz PTA  2024      Electronically signed by Desire Diaz PTA at 24 1404          Occupational Therapy Notes (most recent note)        Maricarmen Castillo COTA at 24 1420          Patient Name: Corinne Rivera  : 1931    MRN: [id number]                              Today's Date: 2024       Admit Date: 2024    Visit Dx: Therapist utilized gait belt, applied non-slipped socks, provided fall risk education/prevention, & facilitated muscle strengthening PRN to reduce patient falls risk during this session.      ICD-10-CM ICD-9-CM   1. Ground-level fall  W18.30XA E888.9   2. Closed fracture of left pubis, unspecified portion of pubis, initial encounter  S32.502A 808.2   3. Ambulatory dysfunction  R26.2 719.7   4. Impaired mobility [Z74.09]  Z74.09 799.89     Patient Active Problem List   Diagnosis    Asymptomatic postmenopausal status    Hyponatremia    Hypertension    Acute metabolic encephalopathy    Fever    Thrombocytopenia    Elevated liver enzymes    Pelvic fracture     Past  Medical History:   Diagnosis Date    Breast cancer 1995    right     Hx of radiation therapy     Hypertension     Vitamin D deficiency      Past Surgical History:   Procedure Laterality Date    APPENDECTOMY      BREAST BIOPSY      BREAST LUMPECTOMY Right 1995    BREAST LUMPECTOMY Right     HYSTERECTOMY        General Information       Row Name 01/22/24 1420          OT Time and Intention    Document Type therapy note (daily note)  -MT     Mode of Treatment occupational therapy  -MT       Row Name 01/22/24 1420          General Information    Patient Profile Reviewed yes  -MT     Existing Precautions/Restrictions fall  WBAT LLE  -MT       Row Name 01/22/24 1420          Safety Issues, Functional Mobility    Impairments Affecting Function (Mobility) cognition;pain  -MT               User Key  (r) = Recorded By, (t) = Taken By, (c) = Cosigned By      Initials Name Provider Type    Maricarmen Thapa COTA Occupational Therapist Assistant                     Mobility/ADL's       Row Name 01/22/24 1420          Transfers    Comment, (Transfers) in chair, pt requested to stay in chair d/t comfort. Notified family member in room to encourage frequent repositioning to decrease risk of skin breakdown. Notified NSG, Chair alarm and call light with pt  -MT       Row Name 01/22/24 1420          Sit-Stand Transfer    Sit-Stand [place] (Transfers) verbal cues;contact guard  -MT     Assistive Device (Sit-Stand Transfers) walker, front-wheeled  -MT     Comment, (Sit-Stand Transfer) pt pulls from RW. Decreased cognition and processing to understand sequencing of task  -MT       Row Name 01/22/24 1420          Mobility    Extremity Weight-bearing Status left lower extremity  -MT     Left Lower Extremity (Weight-bearing Status) weight-bearing as tolerated (WBAT)  -MT               User Key  (r) = Recorded By, (t) = Taken By, (c) = Cosigned By      Initials Name Provider Type    Maricarmen Thapa COTA Occupational Therapist  Assistant                   Obj/Interventions    No documentation.                  Goals/Plan    No documentation.                  Clinical Impression       [facility] Name 01/22/24 1420          Pain Assessment    Additional Documentation Pain Scale: FACES Pre/Post-Treatment (Group)  -MT       Row Name 01/22/24 1420          Pain Scale: FACES Pre/Post-Treatment    Pain: FACES Scale, Pretreatment 0-->no hurt  -MT     Posttreatment Pain Rating 4-->hurts little more  -MT     Pre/Posttreatment Pain Comment with standing  -MT       Row Name 01/22/24 1420          Therapy Plan Review/Discharge Plan (OT)    Anticipated Discharge Disposition (OT) skilled nursing facility  -MT       Row Name 01/22/24 1420          Positioning and Restraints    Pre-Treatment Position sitting in chair/recliner  -MT     Post Treatment Position chair  -MT     In Chair notified nsg;sitting;call light within reach;encouraged to call for assist;exit alarm on;with family/caregiver  -MT               User Key  (r) = Recorded By, (t) = Taken By, (c) = Cosigned By      Initials Name Provider Type    MT Maricarmen Castillo COTA Occupational Therapist Assistant                   Outcome Measures       Row Name 01/22/24 1420          How much help from another is currently needed...    Putting on and taking off regular lower body clothing? 2  -MT     Bathing (including washing, rinsing, and drying) 2  -MT     Toileting (which includes using toilet bed pan or urinal) 2  -MT     Putting on and taking off regular upper body clothing 3  -MT     Taking care of personal grooming (such as brushing teeth) 3  -MT     Eating meals 4  -MT     AM-PAC 6 Clicks Score (OT) 16  -MT       [facility] Name 01/22/24 1300 01/22/24 0805       How much help from another person do you currently need...    Turning from your back to your side while in flat bed without using bedrails? 3  -KJ 3  -CK    Moving from lying on back to sitting on the side of a flat bed without bedrails? 3  -KJ 3  -CK     Moving to and from a bed to a chair (including a wheelchair)? 3  -KJ 3  -CK    Standing up from a chair using your arms (e.g., wheelchair, bedside chair)? 3  -KJ 2  -CK    Climbing 3-5 steps with a railing? 2  -KJ 1  -CK    To walk in hospital room? 2  -KJ 2  -CK    AM-PAC 6 Clicks Score (PT) 16  -KJ 14  -CK    Highest Level of Mobility Goal 5 --> Static standing  -KJ 4 --> Transfer to chair/commode  -CK      Row Name 01/22/24 1300          Functional Assessment    Outcome Measure Options AM-PAC 6 Clicks Basic Mobility (PT)  -KJ               User Key  (r) = Recorded By, (t) = Taken By, (c) = Cosigned By      Initials Name Provider Type    Desire Burgos PTA Physical Therapist Assistant    Maricarmen Thapa COTA Occupational Therapist Assistant    CK Abner Hoffmann, RN Registered Nurse                    Occupational Therapy Education       Title: PT OT SLP Therapies (In Progress)       Topic: Occupational Therapy (In Progress)       Point: ADL training (Done)       Description:   Instruct learner(s) on proper safety adaptation and remediation techniques during self care or transfers.   Instruct in proper use of assistive devices.                  Learning Progress Summary             Patient Acceptance, E, VU,NR by LS at 1/20/2024 1103                         Point: Home exercise program (Not Started)       Description:   Instruct learner(s) on appropriate technique for monitoring, assisting and/or progressing therapeutic exercises/activities.                  Learner Progress:  Not documented in this visit.              Point: Precautions (Done)       Description:   Instruct learner(s) on prescribed precautions during self-care and functional transfers.                  Learning Progress Summary             Patient Acceptance, E, VU,NR by LS at 1/20/2024 1103                         Point: Body mechanics (Done)       Description:   Instruct learner(s) on proper positioning and spine alignment during  self-care, functional mobility activities and/or exercises.                  Learning Progress Summary             Patient Acceptance, E, VU,NR by  at 1/20/2024 1103                                         User Key       Initials Effective Dates Name Provider Type Discipline     06/20/22 -  Nalini Barkley, OTR/L Occupational Therapist OT                  OT Recommendation and Plan     Plan of Care Review  Plan of Care Reviewed With: patient, family  Progress: improving  Outcome Evaluation: Pt upright in chair. Performed BUE ther ex x10 reps x 2 sets with 3# wand for increased fxl endurance for t/fs and mobility/use of RW. Pt required demo and initiation of ther ex d/t decreased sequencing of commands. Pt pulls from RW despite cues and correction. Sit<>stand x3 reps with CGA and increased pain noted. Pt c/o burnign with urination, NSG notified. Recommend continued OT POC. Pt requests to stay in chair, family member educated on frequent position changes to decrease risk of skin breakdown, chair alarm in place and NSG notified. Recommend continued rehab     Time Calculation:         Time Calculation- OT       Row Name 01/22/24 1420             Time Calculation- OT    OT Start Time 1420  -MT      OT Stop Time 1445  -MT      OT Time Calculation (min) 25 min  -MT      Total Timed Code Minutes- OT 25 minute(s)  -MT      OT Received On 01/22/24  -MT         Timed Charges    95774 - OT Therapeutic Exercise Minutes 15  -MT      70353 - OT Therapeutic Activity Minutes 10  -MT         Total Minutes    Timed Charges Total Minutes 25  -MT       Total Minutes 25  -MT                User Key  (r) = Recorded By, (t) = Taken By, (c) = Cosigned By      Initials Name Provider Type    MT Maricarmen Castillo COTA Occupational Therapist Assistant                  Therapy Charges for Today       Code Description Service Date Service Provider Modifiers Qty    58620527270 HC OT THER PROC EA 15 MIN 1/22/2024 Maricarmen Castillo COTA GO 1     [id number]  OT THERAPEUTIC ACT EA 15 MIN 1/22/2024 Maricarmen Castillo COTA GO 1                 ARIANA Mead  1/22/2024    Electronically signed by Maricarmen Castillo COTA at 01/22/24 1604

## 2024-01-23 NOTE — DISCHARGE PLACEMENT REQUEST
"To: [facility]    From: Ines ELIZABETH [phone]            Fatimah Rivera (89 y.o. Female)       Date of Birth   06/20/1931    Social Security Number       Address   [address] [place] [address]    Home Phone   [phone]    MRN   [id number]       Muslim   Synagogue    Marital Status                               Admission Date   1/19/24    Admission Type   Emergency    Admitting Provider   Gasper Scott MD    Attending Provider   Gasper Scott MD    Department, Room/Bed   [facility] 3A, 354/1       Discharge Date       Discharge Disposition       Discharge Destination                                 Attending Provider: Gasper Scott MD    Allergies: Penicillins    Isolation: None   Infection: None   Code Status: CPR    Ht: 154.9 cm (61\")   Wt: 47.9 kg (105 lb 8 oz)    Admission Cmt: None   Principal Problem: Pelvic fracture [S32.9XXA]                   Active Insurance as of 1/19/2024       Primary Coverage       Payor Plan Insurance Group Employer/Plan Group    MEDICARE MEDICARE A & B        Payor Plan Address Payor Plan Phone Number Payor Plan Fax Number Effective Dates    [address] [phone]  6/1/1996 - None Entered    [facility] [address]         Subscriber Name Subscriber Birth Date Member ID       FATIMAH RIVERA 6/20/1931 [id number]               Secondary Coverage       Payor Plan Insurance Group Employer/Plan Group    [facility] [id number]       Payor Plan Address Payor Plan Phone Number Payor Plan Fax Number Effective Dates    [address] [address] [phone]  11/1/2015 - None Entered    [facility] [id number]         Subscriber Name Subscriber Birth Date Member ID       FATIMAH RIVERA 6/20/1931 [id number]                     Emergency Contacts        (Rel.) Home Phone Work Phone Mobile Phone    Wendy Solares (Daughter) [phone] -- --              Lab Results (last 24 hours)       Procedure Component Value Units Date/Time    Urinalysis " With Culture If Indicated - Urine, Clean Catch [[id number]]  (Abnormal) Collected: 01/22/24 1649    Specimen: Urine, Clean Catch Updated: 01/22/24 1710     Color, UA Yellow     Appearance, UA Cloudy     pH, UA 6.5     Specific Gravity, UA 1.012     Glucose, UA Negative     Ketones, UA Negative     Bilirubin, UA Negative     Blood, UA Small (1+)     Protein, UA Negative     Leuk Esterase, UA Large (3+)     Nitrite, UA Negative     Urobilinogen, UA 0.2 E.U./dL    Narrative:      In absence of clinical symptoms, the presence of pyuria, bacteria, and/or nitrites on the urinalysis result does not correlate with infection.    Urinalysis, Microscopic Only - Urine, Clean Catch [[id number]]  (Abnormal) Collected: 01/22/24 1649    Specimen: Urine, Clean Catch Updated: 01/22/24 1710     RBC, UA 3-5 /HPF      WBC, UA 11-20 /HPF      Bacteria, UA 3+ /HPF      Squamous Epithelial Cells, UA None Seen /HPF      Hyaline Casts, UA None Seen /LPF      Methodology Manual Light Microscopy    Urine Culture - Urine, Urine, Clean Catch [[id number]] Collected: 01/22/24 1649    Specimen: Urine, Clean Catch Updated: 01/22/24 1710    Osmolality, Urine - Urine, Clean Catch [[id number]]  (Normal) Collected: 01/22/24 0942    Specimen: Urine, Clean Catch Updated: 01/22/24 1121     Osmolality, Urine 917 mOsm/kg     Osmolality, Serum [[id number]]  (Normal) Collected: 01/22/24 0831    Specimen: Blood Updated: 01/22/24 1118     Osmolality 284 mOsm/kg     Comprehensive Metabolic Panel [[id number]]  (Abnormal) Collected: 01/22/24 0831    Specimen: Blood Updated: 01/22/24 1033     Glucose 95 mg/dL      BUN 16 mg/dL      Creatinine 0.49 mg/dL      Sodium 132 mmol/L      Potassium 4.3 mmol/L      Chloride 95 mmol/L      CO2 28.0 mmol/L      Calcium 8.5 mg/dL      Total Protein 6.9 g/dL      Albumin 3.3 g/dL      ALT (SGPT) 8 U/L      AST (SGOT) 15 U/L      Alkaline Phosphatase 63 U/L      Total Bilirubin 0.8 mg/dL      Globulin 3.6 gm/dL      A/G Ratio 0.9 g/dL       BUN/Creatinine Ratio 32.7     Anion Gap 9.0 mmol/L      eGFR 88.6 mL/min/1.73     Narrative:      GFR Normal >60  Chronic Kidney Disease <60  Kidney Failure <15    The GFR formula is only valid for adults with stable renal function between ages 18 and 70.    Sodium, Urine, Random - Urine, Clean Catch [[id number]] Collected: 01/22/24 0942    Specimen: Urine, Clean Catch Updated: 01/22/24 1013     Sodium, Urine 61 mmol/L     Narrative:      Reference intervals for random urine have not been established.  Clinical usage is dependent upon physician's interpretation in combination with other laboratory tests.       CBC & Differential [[id number]]  (Abnormal) Collected: 01/22/24 0831    Specimen: Blood Updated: 01/22/24 1002    Narrative:      The following orders were created for panel order CBC & Differential.  Procedure                               Abnormality         Status                     ---------                               -----------         ------                     CBC Auto Differential[[id number]]        Abnormal            Final result                 Please view results for these tests on the individual orders.    CBC Auto Differential [[id number]]  (Abnormal) Collected: 01/22/24 0831    Specimen: Blood Updated: 01/22/24 1002     WBC 6.80 10*3/mm3      RBC 4.66 10*6/mm3      Hemoglobin 14.2 g/dL      Hematocrit 42.7 %      MCV 91.6 fL      MCH 30.5 pg      MCHC 33.3 g/dL      RDW 13.9 %      RDW-SD 46.8 fl      MPV 10.3 fL      Platelets 239 10*3/mm3      Neutrophil % 67.5 %      Lymphocyte % 16.6 %      Monocyte % 12.4 %      Eosinophil % 2.5 %      Basophil % 0.7 %      Immature Grans % 0.3 %      Neutrophils, Absolute 4.59 10*3/mm3      Lymphocytes, Absolute 1.13 10*3/mm3      Monocytes, Absolute 0.84 10*3/mm3      Eosinophils, Absolute 0.17 10*3/mm3      Basophils, Absolute 0.05 10*3/mm3      Immature Grans, Absolute 0.02 10*3/mm3      nRBC 0.0 /100 WBC

## 2024-01-23 NOTE — PLAN OF CARE
Goal Outcome Evaluation:  Plan of Care Reviewed With: patient        Progress: improving  Outcome Evaluation: Pt was cga for supine to sit and grimaced in pain with movement. Pt with vc's was min x1 to stand and take steps to BSC. Pt voided and completed hygiene Independently.Pt was min x1 to stand and was able to walk 30ft with RW to door with antalgic gait.Pt progressing and following commands well and would benefit from continued PT at inpatient facility.                                arthritis

## 2024-01-23 NOTE — CASE MANAGEMENT/SOCIAL WORK
Continued Stay Note  [facility]     Patient Name: Corinne Rivera  MRN: [id number]  Today's Date: 1/23/2024    Admit Date: 1/19/2024    Plan: Pending   Discharge Plan       Row Name 01/23/24 0818       Plan    Plan Pending    Plan Comments [facility] has denied advising acute rehab is not the appropriate level of care.  However, patient is observation status and SNF is not an option.  Awaiting response from [facility] and [facility].                   Discharge Codes    No documentation.                       KELSEY Bhandari

## 2024-01-23 NOTE — PLAN OF CARE
Goal Outcome Evaluation:  Plan of Care Reviewed With: patient        Progress: improving  Outcome Evaluation: t/f <> shower chair with CGA-Lyubov d/t needing assist with RW management. pt also reaching outside of JES[facility]UEL attempting to straighten linens on bed. Cues needed for safety d/t poor balance. Seated shower Lyubov needed, pt also re washes areas and forgetful of tasks completed vs tasks needed to be completed. Demo's poor sequencing and awareness for needed ADL items during tasks. Pt dons button shirt and bra with maxA but doffs with Lyubov. Pt needed MaxA for socks. Only reports pain in standing or with fxl mobility. Recommend SNF at d/c for safest option      Anticipated Discharge Disposition (OT): skilled nursing facility

## 2024-01-23 NOTE — CASE MANAGEMENT/SOCIAL WORK
Continued Stay Note   [facility]     Patient Name: Corinne Rivera  MRN: [id number]  Today's Date: 1/23/2024    Admit Date: 1/19/2024    Plan: [facility]   Discharge Plan       Row Name 01/23/24 1456       Plan    Plan [facility]    Patient/Family in Agreement with Plan yes    Plan Comments Patient has received a bed offer from [facility].  Patient's daughter aware and has accepted bed offer.  Patient will need three night inpatient stay for Medicare to cover care at SNF, changed to inpatient status today, bed will be available at [facility] on Friday, Jan. 26.    Final Discharge Disposition Code 03 - skilled nursing facility (SNF)                   Discharge Codes    No documentation.                 Expected Discharge Date and Time       Expected Discharge Date Expected Discharge Time    Jan 25, 2024               KELSEY Bhandari

## 2024-01-23 NOTE — THERAPY TREATMENT NOTE
Patient Name: Corinne Rivera  : 1931    MRN: [id number]                              Today's Date: 2024       Admit Date: 2024    Visit Dx: Therapist utilized gait belt, applied non-slipped socks, provided fall risk education/prevention, & facilitated muscle strengthening PRN to reduce patient falls risk during this session.      ICD-10-CM ICD-9-CM   1. Ground-level fall  W18.30XA E888.9   2. Closed fracture of left pubis, unspecified portion of pubis, initial encounter  S32.502A 808.2   3. Ambulatory dysfunction  R26.2 719.7   4. Impaired mobility [Z74.09]  Z74.09 799.89     Patient Active Problem List   Diagnosis    Asymptomatic postmenopausal status    Hyponatremia    Hypertension    Acute metabolic encephalopathy    Fever    Thrombocytopenia    Elevated liver enzymes    Pelvic fracture     Past Medical History:   Diagnosis Date    Breast cancer     right     Hx of radiation therapy     Hypertension     Vitamin D deficiency      Past Surgical History:   Procedure Laterality Date    APPENDECTOMY      BREAST BIOPSY      BREAST LUMPECTOMY Right 1995    BREAST LUMPECTOMY Right     HYSTERECTOMY        General Information       Row Name 24 1320          OT Time and Intention    Document Type therapy note (daily note)  -MT     Mode of Treatment occupational therapy  -MT       Row Name 24 1320          General Information    Patient Profile Reviewed yes  -MT     Existing Precautions/Restrictions fall  WBAT LLE  -MT       Row Name 24 1320          Cognition    Orientation Status (Cognition) oriented to;person  -MT       Row Name 24 1320          Safety Issues, Functional Mobility    Impairments Affecting Function (Mobility) cognition;pain  -MT               User Key  (r) = Recorded By, (t) = Taken By, (c) = Cosigned By      Initials Name Provider Type    MT Maricarmen Castillo COTA Occupational Therapist Assistant                     Mobility/ADL's       Row Name 24 1324           Bed Mobility    Sit-Supine [place] (Bed Mobility) contact guard  -MT     Assistive Device (Bed Mobility) head of bed elevated  -MT       Row Name 01/23/24 1320          Transfers    Transfers sit-stand transfer;stand-sit transfer  -MT     Comment, (Transfers) t/f <> shower chair with CGA-Thiago d/t needing assist with RW management. pt also reaching outside of JES[facility]UEL attempting to straighten linens on bed. Cues needed for safety d/t poor balance  -MT       Row Name 01/23/24 1320          Sit-Stand Transfer    Sit-Stand [place] (Transfers) contact guard  -MT       Row Name 01/23/24 1320          Stand-Sit Transfer    Stand-Sit [place] (Transfers) contact guard  -[facility] Name 01/23/24 1320          Functional Mobility    Functional Mobility- Comment short distance to shower chair  -[facility] Name 01/23/24 1320          Mobility    Extremity Weight-bearing Status left lower extremity  -MT     Left Lower Extremity (Weight-bearing Status) weight-bearing as tolerated (WBAT)  -[facility] Name 01/23/24 1320          Upper Body Dressing Assessment/Training    [place] Level (Upper Body Dressing) upper body dressing skills;minimum assist (75% patient effort)  -[facility] Name 01/23/24 1320          Lower Body Dressing Assessment/Training    [place] Level (Lower Body Dressing) lower body dressing skills;maximum assist (25% patient effort)  -MT               User Key  (r) = Recorded By, (t) = Taken By, (c) = Cosigned By      Initials Name Provider Type    MT Maricarmen Castillo COTA Occupational Therapist Assistant                   Obj/Interventions    No documentation.                  Goals/Plan    No documentation.                  Clinical Impression       [facility] Name 01/23/24 1320          Pain Scale: FACES Pre/Post-Treatment    Pain: FACES Scale, Pretreatment 0-->no hurt  -MT     Posttreatment Pain Rating 4-->hurts little more  standing or mobility  -MT       Row Name 01/23/24 1320           Therapy Plan Review/Discharge Plan (OT)    Anticipated Discharge Disposition (OT) skilled nursing facility  -MT       Row Name 01/23/24 1320          Positioning and Restraints    Pre-Treatment Position sitting in chair/recliner  -MT     Post Treatment Position bed  -MT     In Bed notified nsg;fowlers;call light within reach;encouraged to call for assist;exit alarm on;side rails up x2;with family/caregiver  -MT               User Key  (r) = Recorded By, (t) = Taken By, (c) = Cosigned By      Initials Name Provider Type    MT Maricarmen Castillo COTA Occupational Therapist Assistant                   Outcome Measures       Row Name 01/23/24 1320          How much help from another is currently needed...    Putting on and taking off regular lower body clothing? 2  -MT     Bathing (including washing, rinsing, and drying) 3  -MT     Toileting (which includes using toilet bed pan or urinal) 3  -MT     Putting on and taking off regular upper body clothing 3  -MT     Taking care of personal grooming (such as brushing teeth) 3  -MT     Eating meals 4  -MT     AM-PAC 6 Clicks Score (OT) 18  -MT       Row Name 01/23/24 1100 01/23/24 0800       How much help from another person do you currently need...    Turning from your back to your side while in flat bed without using bedrails? 3  -AE 3  -SB    Moving from lying on back to sitting on the side of a flat bed without bedrails? 3  -AE 3  -SB    Moving to and from a bed to a chair (including a wheelchair)? 3  -AE 3  -SB    Standing up from a chair using your arms (e.g., wheelchair, bedside chair)? 3  -AE 3  -SB    Climbing 3-5 steps with a railing? 2  -AE 2  -SB    To walk in hospital room? 2  -AE 2  -SB    AM-PAC 6 Clicks Score (PT) 16  -AE 16  -SB    Highest Level of Mobility Goal 5 --> Static standing  -AE 5 --> Static standing  -SB      Row Name 01/23/24 1100          Functional Assessment    Outcome Measure Options AM-PAC 6 Clicks Basic Mobility (PT)  -AE               User  Key  (r) = Recorded By, (t) = Taken By, (c) = Cosigned By      Initials Name Provider Type    AE Patti Pereira, ELYSSA Physical Therapist Assistant    Maricarmen Thapa COTA Occupational Therapist Assistant    Kiarra Mason, RN Registered Nurse                    Occupational Therapy Education       Title: PT OT SLP Therapies (In Progress)       Topic: Occupational Therapy (In Progress)       Point: ADL training (In Progress)       Description:   Instruct learner(s) on proper safety adaptation and remediation techniques during self care or transfers.   Instruct in proper use of assistive devices.                  Learning Progress Summary             Patient Acceptance, E, NR by  at 1/22/2024 2243    Acceptance, E, VU,NR by  at 1/20/2024 1103   Significant Other Acceptance, E, NR by  at 1/22/2024 2243                         Point: Home exercise program (In Progress)       Description:   Instruct learner(s) on appropriate technique for monitoring, assisting and/or progressing therapeutic exercises/activities.                  Learning Progress Summary             Patient Acceptance, E, NR by  at 1/22/2024 2243   Significant Other Acceptance, E, NR by  at 1/22/2024 2243                         Point: Precautions (In Progress)       Description:   Instruct learner(s) on prescribed precautions during self-care and functional transfers.                  Learning Progress Summary             Patient Acceptance, E, NR by  at 1/22/2024 2243    Acceptance, E, VU,NR by LS at 1/20/2024 1103   Significant Other Acceptance, E, NR by  at 1/22/2024 2243                         Point: Body mechanics (In Progress)       Description:   Instruct learner(s) on proper positioning and spine alignment during self-care, functional mobility activities and/or exercises.                  Learning Progress Summary             Patient Acceptance, E, NR by  at 1/22/2024 2243    Acceptance, E, VU,NR by  at 1/20/2024 1103    Significant Other Acceptance, E, NR by MOUSTAPHA at 1/22/2024 1733                                         User Key       Initials Effective Dates Name Provider Type Discipline     06/20/22 -  Nalini Barkley, OTR/L Occupational Therapist OT    MOUSTAPHA 04/25/23 -  Shravan Larsen, RN Registered Nurse Nurse                  OT Recommendation and Plan     Plan of Care Review  Plan of Care Reviewed With: patient  Progress: improving  Outcome Evaluation: t/f <> shower chair with CGA-Lyubov d/t needing assist with RW management. pt also reaching outside of [facility] attempting to straighten linens on bed. Cues needed for safety d/t poor balance. Seated shower Lyubov needed, pt also re washes areas and forgetful of tasks completed vs tasks needed to be completed. Demo's poor sequencing and awareness for needed ADL items during tasks. Pt dons button shirt and bra with maxA but doffs with Lyubov. Pt needed MaxA for socks. Only reports pain in standing or with fxl mobility. Recommend SNF at d/c for safest option     Time Calculation:         Time Calculation- OT       Row Name 01/23/24 1320 01/23/24 1102          Time Calculation- OT    OT Start Time 1320  -MT --     OT Stop Time 1430  -MT --     OT Time Calculation (min) 70 min  -MT --     Total Timed Code Minutes- OT 70 minute(s)  -MT --     OT Received On 01/23/24  -MT --        Timed Charges    57559 - Gait Training Minutes  -- 20  -AE     09299 - OT Self Care/Mgmt Minutes 70  -MT --        Total Minutes    Timed Charges Total Minutes 70  -MT 20  -AE      Total Minutes 70  -MT 20  -AE               User Key  (r) = Recorded By, (t) = Taken By, (c) = Cosigned By      Initials Name Provider Type    AE Patti Pereira, ELYSSA Physical Therapist Assistant    Maricarmen Thapa COTA Occupational Therapist Assistant                  Therapy Charges for Today       Code Description Service Date Service Provider Modifiers Qty    65100540514 HC OT THER PROC EA 15 MIN 1/22/2024 Maricarmen Castillo COTA GO 1     08850933641 HC OT THERAPEUTIC ACT EA 15 MIN 1/22/2024 Maricarmen Castillo COTA GO 1    67668161219 HC OT SELF CARE/MGMT/TRAIN EA 15 MIN 1/23/2024 Maricarmen Castillo COTA GO 5                 ARIANA Mead  1/23/2024

## 2024-01-24 LAB
ALBUMIN SERPL-MCNC: 2.8 G/DL (ref 3.5–5.2)
ALBUMIN/GLOB SERPL: 0.8 G/DL
ALP SERPL-CCNC: 58 U/L (ref 39–117)
ALT SERPL W P-5'-P-CCNC: 6 U/L (ref 1–33)
ANION GAP SERPL CALCULATED.3IONS-SCNC: 8 MMOL/L (ref 5–15)
AST SERPL-CCNC: 12 U/L (ref 1–32)
BASOPHILS # BLD AUTO: 0.05 10*3/MM3 (ref 0–0.2)
BASOPHILS NFR BLD AUTO: 0.8 % (ref 0–1.5)
BILIRUB SERPL-MCNC: 0.6 MG/DL (ref 0–1.2)
BUN SERPL-MCNC: 12 MG/DL (ref 8–23)
BUN/CREAT SERPL: 25.5 (ref 7–25)
CALCIUM SPEC-SCNC: 8.2 MG/DL (ref 8.2–9.6)
CHLORIDE SERPL-SCNC: 98 MMOL/L (ref 98–107)
CO2 SERPL-SCNC: 27 MMOL/L (ref 22–29)
CREAT SERPL-MCNC: 0.47 MG/DL (ref 0.57–1)
DEPRECATED RDW RBC AUTO: 45.3 FL (ref 37–54)
EGFRCR SERPLBLD CKD-EPI 2021: 89.4 ML/MIN/1.73
EOSINOPHIL # BLD AUTO: 0.17 10*3/MM3 (ref 0–0.4)
EOSINOPHIL NFR BLD AUTO: 2.7 % (ref 0.3–6.2)
ERYTHROCYTE [DISTWIDTH] IN BLOOD BY AUTOMATED COUNT: 13.5 % (ref 12.3–15.4)
GLOBULIN UR ELPH-MCNC: 3.5 GM/DL
GLUCOSE SERPL-MCNC: 94 MG/DL (ref 65–99)
HCT VFR BLD AUTO: 39.1 % (ref 34–46.6)
HGB BLD-MCNC: 13.1 G/DL (ref 12–15.9)
IMM GRANULOCYTES # BLD AUTO: 0.02 10*3/MM3 (ref 0–0.05)
IMM GRANULOCYTES NFR BLD AUTO: 0.3 % (ref 0–0.5)
LYMPHOCYTES # BLD AUTO: 1.32 10*3/MM3 (ref 0.7–3.1)
LYMPHOCYTES NFR BLD AUTO: 21.3 % (ref 19.6–45.3)
MCH RBC QN AUTO: 30.3 PG (ref 26.6–33)
MCHC RBC AUTO-ENTMCNC: 33.5 G/DL (ref 31.5–35.7)
MCV RBC AUTO: 90.3 FL (ref 79–97)
MONOCYTES # BLD AUTO: 0.81 10*3/MM3 (ref 0.1–0.9)
MONOCYTES NFR BLD AUTO: 13 % (ref 5–12)
NEUTROPHILS NFR BLD AUTO: 3.84 10*3/MM3 (ref 1.7–7)
NEUTROPHILS NFR BLD AUTO: 61.9 % (ref 42.7–76)
NRBC BLD AUTO-RTO: 0 /100 WBC (ref 0–0.2)
PLATELET # BLD AUTO: 242 10*3/MM3 (ref 140–450)
PMV BLD AUTO: 9.6 FL (ref 6–12)
POTASSIUM SERPL-SCNC: 4.2 MMOL/L (ref 3.5–5.2)
PROT SERPL-MCNC: 6.3 G/DL (ref 6–8.5)
RBC # BLD AUTO: 4.33 10*6/MM3 (ref 3.77–5.28)
SODIUM SERPL-SCNC: 133 MMOL/L (ref 136–145)
WBC NRBC COR # BLD AUTO: 6.21 10*3/MM3 (ref 3.4–10.8)

## 2024-01-24 PROCEDURE — 85025 COMPLETE CBC W/AUTO DIFF WBC: CPT | Performed by: INTERNAL MEDICINE

## 2024-01-24 PROCEDURE — 97535 SELF CARE MNGMENT TRAINING: CPT

## 2024-01-24 PROCEDURE — 97116 GAIT TRAINING THERAPY: CPT

## 2024-01-24 PROCEDURE — 25010000002 CEFTRIAXONE PER 250 MG: Performed by: INTERNAL MEDICINE

## 2024-01-24 PROCEDURE — 97530 THERAPEUTIC ACTIVITIES: CPT

## 2024-01-24 PROCEDURE — 80053 COMPREHEN METABOLIC PANEL: CPT | Performed by: INTERNAL MEDICINE

## 2024-01-24 RX ORDER — TRAZODONE HYDROCHLORIDE 50 MG/1
25 TABLET ORAL NIGHTLY
Status: DISCONTINUED | OUTPATIENT
Start: 2024-01-24 | End: 2024-01-26 | Stop reason: HOSPADM

## 2024-01-24 RX ADMIN — Medication 10 ML: at 21:40

## 2024-01-24 RX ADMIN — CALCIUM 1000 MG: 500 TABLET ORAL at 09:28

## 2024-01-24 RX ADMIN — Medication 10 ML: at 09:28

## 2024-01-24 RX ADMIN — ACETAMINOPHEN 650 MG: 325 TABLET, FILM COATED ORAL at 16:12

## 2024-01-24 RX ADMIN — CEFTRIAXONE 1000 MG: 1 INJECTION, POWDER, FOR SOLUTION INTRAMUSCULAR; INTRAVENOUS at 21:39

## 2024-01-24 RX ADMIN — TRAZODONE HYDROCHLORIDE 25 MG: 50 TABLET ORAL at 21:39

## 2024-01-24 NOTE — THERAPY TREATMENT NOTE
Acute Care - Physical Therapy Treatment Note  [facility]     Patient Name: Corinne Rivera  : 1931  MRN: [id number]  Today's Date: 2024      Visit Dx:     ICD-10-CM ICD-9-CM   1. Ground-level fall  W18.30XA E888.9   2. Closed fracture of left pubis, unspecified portion of pubis, initial encounter  S32.502A 808.2   3. Ambulatory dysfunction  R26.2 719.7   4. Impaired mobility [Z74.09]  Z74.09 799.89     Patient Active Problem List   Diagnosis    Asymptomatic postmenopausal status    Hyponatremia    Hypertension    Acute metabolic encephalopathy    Fever    Thrombocytopenia    Elevated liver enzymes    Pelvic fracture     Past Medical History:   Diagnosis Date    Breast cancer     right     Hx of radiation therapy     Hypertension     Vitamin D deficiency      Past Surgical History:   Procedure Laterality Date    APPENDECTOMY      BREAST BIOPSY      BREAST LUMPECTOMY Right 1995    BREAST LUMPECTOMY Right     HYSTERECTOMY       PT Assessment (last 12 hours)       PT Evaluation and Treatment       Row Name 24 1308          Physical Therapy Time and Intention    Subjective Information no complaints  -KJ     Document Type therapy note (daily note)  -KJ     Mode of Treatment physical therapy  -KJ     Patient Effort good  -KJ       Row Name 24 1308          General Information    Existing Precautions/Restrictions fall  WBAT LLE  -KJ       Row Name 24 1308          Pain    Pretreatment Pain Rating 7/10  -KJ     Posttreatment Pain Rating 7/10  -KJ     Pain Location - Side/Orientation Left  -KJ     Pain Location lower  -KJ     Pain Location - groin  -KJ       Row Name 24 1308          Bed Mobility    Sit-Supine [place] (Bed Mobility) minimum assist (75% patient effort)  -KJ       Row Name 24 1308          Sit-Stand Transfer    Sit-Stand [place] (Transfers) verbal cues;contact guard;minimum assist (75% patient effort)  -KJ     Assistive Device (Sit-Stand Transfers) walker  front-wheeled  -KJ       Row Name 01/24/24 1308          Stand-Sit Transfer    Stand-Sit [place] (Transfers) verbal cues;contact guard  -KJ       Row Name 01/24/24 1308          Gait/Stairs (Locomotion)    [place] Level (Gait) verbal cues;minimum assist (75% patient effort)  -KJ     Assistive Device (Gait) walker, front-wheeled  -KJ     Distance in Feet (Gait) 25' x 2  -KJ     Deviations/Abnormal Patterns (Gait) antalgic  -KJ       Row Name 01/24/24 1308          Positioning and Restraints    Pre-Treatment Position sitting in chair/recliner  -KJ     Post Treatment Position bed  -KJ     In Bed exit alarm on;call light within reach;with family/caregiver  -KJ               User Key  (r) = Recorded By, (t) = Taken By, (c) = Cosigned By      Initials Name Provider Type    Desire Burgos, PTA Physical Therapist Assistant                    Physical Therapy Education       Title: PT OT SLP Therapies (In Progress)       Topic: Physical Therapy (In Progress)       Point: Mobility training (In Progress)       Learning Progress Summary             Patient Acceptance, E, NR by  at 1/24/2024 0113    Acceptance, E, VU by AE at 1/23/2024 1102    Comment: t/f's    Acceptance, E, NR by  at 1/22/2024 2243    Acceptance, E, NL by MS at 1/20/2024 0945    Comment: role of PT in her care   Significant Other Acceptance, E, NR by  at 1/24/2024 0113    Acceptance, E, NR by  at 1/22/2024 2243                         Point: Home exercise program (In Progress)       Learning Progress Summary             Patient Acceptance, E, NR by  at 1/24/2024 0113    Acceptance, E, NR by  at 1/22/2024 2243   Significant Other Acceptance, E, NR by  at 1/24/2024 0113    Acceptance, E, NR by  at 1/22/2024 2243                         Point: Body mechanics (In Progress)       Learning Progress Summary             Patient Acceptance, E, NR by  at 1/24/2024 0113    Acceptance, E, NR by  at 1/22/2024 2243   Significant Other  Acceptance, E, NR by  at 1/24/2024 0113    Acceptance, E, NR by  at 1/22/2024 2243                         Point: Precautions (In Progress)       Learning Progress Summary             Patient Acceptance, E, NR by  at 1/24/2024 0113    Acceptance, E, NR by  at 1/22/2024 2243    Acceptance, E, NL by MS at 1/20/2024 0945    Comment: role of PT in her care   Significant Other Acceptance, E, NR by  at 1/24/2024 0113    Acceptance, E, NR by  at 1/22/2024 2243                                         User Key       Initials Effective Dates Name Provider Type Discipline    AE 02/03/23 -  Patti Pereira, PTA Physical Therapist Assistant PT    MS 07/11/23 -  Shivani Santos, PT, DPT, NCS Physical Therapist PT     04/25/23 -  Shravan Lasren, RN Registered Nurse Nurse                  PT Recommendation and Plan     Plan of Care Reviewed With: patient  Progress: improving  Outcome Evaluation: PT tx completed. Pt denies pn at rest, but grimaces and hold L groin with getting up on feet. Sit<>stand transfers Cg/Thiago, amb 25' x 2 with r wx Thiago.   Outcome Measures       Row Name 01/24/24 1300 01/23/24 1100 01/22/24 1300       How much help from another person do you currently need...    Turning from your back to your side while in flat bed without using bedrails? 3  -KJ 3  -AE 3  -KJ    Moving from lying on back to sitting on the side of a flat bed without bedrails? 3  -KJ 3  -AE 3  -KJ    Moving to and from a bed to a chair (including a wheelchair)? 3  -KJ 3  -AE 3  -KJ    Standing up from a chair using your arms (e.g., wheelchair, bedside chair)? 3  -KJ 3  -AE 3  -KJ    Climbing 3-5 steps with a railing? 3  -KJ 2  -AE 2  -KJ    To walk in hospital room? 3  -KJ 2  -AE 2  -KJ    AM-PAC 6 Clicks Score (PT) 18  -KJ 16  -AE 16  -KJ    Highest Level of Mobility Goal 6 --> Walk 10 steps or more  -KJ 5 --> Static standing  -AE 5 --> Static standing  -KJ       Functional Assessment    Outcome Measure Options AM-PAC 6 Clicks  Basic Mobility (PT)  -KJ AM-PAC 6 Clicks Basic Mobility (PT)  -AE AM-PAC 6 Clicks Basic Mobility (PT)  -KJ              User Key  (r) = Recorded By, (t) = Taken By, (c) = Cosigned By      Initials Name Provider Type    Patti Treadwell PTA Physical Therapist Assistant    Desire Burgos PTA Physical Therapist Assistant                     Time Calculation:    PT Charges       Row Name 01/24/24 1336             Time Calculation    Start Time 1308  -KJ      Stop Time 1332  -KJ      Time Calculation (min) 24 min  -KJ      PT Received On 01/24/24  -KJ      PT Goal Re-Cert Due Date 01/30/24  -KJ         Time Calculation- PT    Total Timed Code Minutes- PT 24 minute(s)  -KJ                User Key  (r) = Recorded By, (t) = Taken By, (c) = Cosigned By      Initials Name Provider Type    Desire Burgos PTA Physical Therapist Assistant                  Therapy Charges for Today       Code Description Service Date Service Provider Modifiers Qty    22552863104 HC GAIT TRAINING EA 15 MIN 1/24/2024 Desire Diaz, ELYSSA GP 1    94464513506 HC PT THERAPEUTIC ACT EA 15 MIN 1/24/2024 Desire Diaz, ELYSSA GP 1            PT G-Codes  Outcome Measure Options: AM-PAC 6 Clicks Basic Mobility (PT)  AM-PAC 6 Clicks Score (PT): 18  AM-PAC 6 Clicks Score (OT): 18    Desire Diaz PTA  1/24/2024

## 2024-01-24 NOTE — PLAN OF CARE
Goal Outcome Evaluation:  Plan of Care Reviewed With: patient, daughter        Progress: no change     Oriented to self only. VSS. Safety maintained.

## 2024-01-24 NOTE — PROGRESS NOTES
Chief Complaint: Hip pain      Interval History:     Daughter states that she was able to sleep after 2 AM last night.  States that trazodone did work but it took a while.  She states she is still very fidgety and confused and trying to get up out of bed at times.  She was able to get up and do well with therapy yesterday.  Daughter states that she feel like she is getting stronger.  She has not been requiring any pain medication and is pain-free lying in bed.  Is continue to Rocephin for UTI will continue for 1 more day.  She has been accepted at a rehab facility and plans to go on Friday.  Blood pressure has been little bit high today.  Likely due to lack of sleep and agitation.  Will just monitor for now but may need antihypertensive at some point in the future.    Review of Systems:   General ROS: negative for - chills or fever  Respiratory ROS: no cough, shortness of breath, or wheezing  Cardiovascular ROS: no chest pain or dyspnea on exertion  Gastrointestinal ROS: negative for - abdominal pain, diarrhea or nausea/vomiting       Vital Signs  Temp:  [97.4 °F (36.3 °C)-98.2 °F (36.8 °C)] 97.4 °F (36.3 °C)  Heart Rate:  [75-98] 79  Resp:  [14-18] 18  BP: (125-154)/(75-90) 154/82    Intake/Output Summary (Last 24 hours) at 1/24/2024 0736  Last data filed at 1/23/2024 0800  Gross per 24 hour   Intake 240 ml   Output --   Net 240 ml       Physical Exam:     General Appearance:    Alert, cooperative, in no acute distress   Head:    N/A   Throat:   N/A   Neck:   N/A   Lungs:     Clear to auscultation,respirations regular, even and                  unlabored    Heart:    Regular rhythm and normal rate, normal S1 and S2, no            murmur, no gallop, no rub   Abdomen:     Normal bowel sounds, no masses, no organomegaly, soft        non-tender, non-distended, no guarding, no rebound                tenderness   Extremities:   No edema, no cyanosis, no clubbing   Pulses:   N/A   Skin:   N/A   Lymph nodes:   N/A    Neurologic:   N/A          Lab Review:       Lab Results (last 24 hours)       Procedure Component Value Units Date/Time    Comprehensive Metabolic Panel [[id number]]  (Abnormal) Collected: 01/24/24 0556    Specimen: Blood Updated: 01/24/24 0635     Glucose 94 mg/dL      BUN 12 mg/dL      Creatinine 0.47 mg/dL      Sodium 133 mmol/L      Potassium 4.2 mmol/L      Chloride 98 mmol/L      CO2 27.0 mmol/L      Calcium 8.2 mg/dL      Total Protein 6.3 g/dL      Albumin 2.8 g/dL      ALT (SGPT) 6 U/L      AST (SGOT) 12 U/L      Alkaline Phosphatase 58 U/L      Total Bilirubin 0.6 mg/dL      Globulin 3.5 gm/dL      A/G Ratio 0.8 g/dL      BUN/Creatinine Ratio 25.5     Anion Gap 8.0 mmol/L      eGFR 89.4 mL/min/1.73     Narrative:      GFR Normal >60  Chronic Kidney Disease <60  Kidney Failure <15    The GFR formula is only valid for adults with stable renal function between ages 18 and 70.    CBC & Differential [[id number]]  (Abnormal) Collected: 01/24/24 0556    Specimen: Blood Updated: 01/24/24 0606    Narrative:      The following orders were created for panel order CBC & Differential.  Procedure                               Abnormality         Status                     ---------                               -----------         ------                     CBC Auto Differential[[id number]]        Abnormal            Final result                 Please view results for these tests on the individual orders.    CBC Auto Differential [[id number]]  (Abnormal) Collected: 01/24/24 0556    Specimen: Blood Updated: 01/24/24 0606     WBC 6.21 10*3/mm3      RBC 4.33 10*6/mm3      Hemoglobin 13.1 g/dL      Hematocrit 39.1 %      MCV 90.3 fL      MCH 30.3 pg      MCHC 33.5 g/dL      RDW 13.5 %      RDW-SD 45.3 fl      MPV 9.6 fL      Platelets 242 10*3/mm3      Neutrophil % 61.9 %      Lymphocyte % 21.3 %      Monocyte % 13.0 %      Eosinophil % 2.7 %      Basophil % 0.8 %      Immature Grans % 0.3 %      Neutrophils, Absolute 3.84  10*3/mm3      Lymphocytes, Absolute 1.32 10*3/mm3      Monocytes, Absolute 0.81 10*3/mm3      Eosinophils, Absolute 0.17 10*3/mm3      Basophils, Absolute 0.05 10*3/mm3      Immature Grans, Absolute 0.02 10*3/mm3      nRBC 0.0 /100 WBC     Urine Culture - Urine, Urine, Clean Catch [[id number]]  (Abnormal) Collected: 01/22/24 1649    Specimen: Urine, Clean Catch Updated: 01/23/24 0929     Urine Culture >100,000 CFU/mL Gram Negative Bacilli    Narrative:      Colonization of the urinary tract without infection is common. Treatment is discouraged unless the patient is symptomatic, pregnant, or undergoing an invasive urologic procedure.          Cultures:    Urine Culture   Date Value Ref Range Status   01/22/2024 >100,000 CFU/mL Gram Negative Bacilli (A)  Preliminary       Radiology Review:  Imaging Results (Last 24 Hours)       ** No results found for the last 24 hours. **                     ASSESSMENT:      Pelvic fracture    Hyponatremia    Hypertension      PLAN:    1.  Plans to go to inpatient rehab facility on Friday.  2.  Continue with PT/OT.  3.  Labs are stable.  Hyponatremia stable.  Will continue to monitor.  4.  Continue with IV antibiotics for UTI.  5.  Change timing of trazodone to 7 PM tonight.  6.  Monitor blood pressure.    Further orders per Dr. Scott.      Azael Zhao, APRN  01/24/24  07:36 CST        Part of this note may be an electronic transcription/translation of spoken language to printed text using the Dragon Dictation System.

## 2024-01-24 NOTE — PLAN OF CARE
Goal Outcome Evaluation:  Plan of Care Reviewed With: patient        Progress: improving  Outcome Evaluation: PT tx completed. Pt denies pn at rest, but grimaces and hold L groin with getting up on feet. Sit<>stand transfers Cg/Thiago, amb 25' x 2 with r wx Thiago.

## 2024-01-24 NOTE — PLAN OF CARE
Goal Outcome Evaluation:      Pt. Alert to self, up in chair, worked well with therapy, daughter at bedside, Plans are set to go to inpatient rehab facility on Friday, safety maintained plan of care ongoing.

## 2024-01-24 NOTE — THERAPY TREATMENT NOTE
Patient Name: Corinne Rivera  : 1931    MRN: [id number]                              Today's Date: 2024       Admit Date: 2024    Visit Dx:     ICD-10-CM ICD-9-CM   1. Ground-level fall  W18.30XA E888.9   2. Closed fracture of left pubis, unspecified portion of pubis, initial encounter  S32.502A 808.2   3. Ambulatory dysfunction  R26.2 719.7   4. Impaired mobility [Z74.09]  Z74.09 799.89     Patient Active Problem List   Diagnosis    Asymptomatic postmenopausal status    Hyponatremia    Hypertension    Acute metabolic encephalopathy    Fever    Thrombocytopenia    Elevated liver enzymes    Pelvic fracture     Past Medical History:   Diagnosis Date    Breast cancer     right     Hx of radiation therapy     Hypertension     Vitamin D deficiency      Past Surgical History:   Procedure Laterality Date    APPENDECTOMY      BREAST BIOPSY      BREAST LUMPECTOMY Right 1995    BREAST LUMPECTOMY Right     HYSTERECTOMY        General Information       Row Name 24 0959          OT Time and Intention    Document Type therapy note (daily note)  -LS     Mode of Treatment occupational therapy  -LS       Row Name 24 0959          General Information    Patient Profile Reviewed yes  -LS     Existing Precautions/Restrictions fall  WBAT LLE  -LS       Row Name 24 0959          Cognition    Orientation Status (Cognition) oriented to;person  -LS       Row Name 24 0959          Safety Issues, Functional Mobility    Safety Issues Affecting Function (Mobility) ability to follow commands;insight into deficits/self-awareness;problem-solving;positioning of assistive device;judgment;safety precaution awareness;safety precautions follow-through/compliance  -LS     Impairments Affecting Function (Mobility) cognition;strength;pain  -LS     Cognitive Impairments, Mobility Safety/Performance attention;awareness, need for assistance;insight into  deficits/self-awareness;judgment;problem-solving/reasoning;safety precaution awareness;safety precaution follow-through;sequencing abilities  -               User Key  (r) = Recorded By, (t) = Taken By, (c) = Cosigned By      Initials Name Provider Type    Nalini Spears OTR/L Occupational Therapist                     Mobility/ADL's       Row Name 01/24/24 0959          Transfers    Transfers sit-stand transfer;toilet transfer  -       Row Name 01/24/24 0959          Sit-Stand Transfer    Sit-Stand [place] (Transfers) contact guard  -     Assistive Device (Sit-Stand Transfers) walker, front-wheeled  -       Row Name 01/24/24 0959          Toilet Transfer    Type (Toilet Transfer) sit-stand;stand-sit  -     [place] Level (Toilet Transfer) minimum assist (75% patient effort);verbal cues  -     Assistive Device (Toilet Transfer) grab bars/safety frame  -       Row Name 01/24/24 0959          Functional Mobility    Patient was able to Ambulate yes  -       Row Name 01/24/24 0959          Activities of Daily Living    BADL Assessment/Intervention toileting  -       Row Name 01/24/24 0959          Toileting Assessment/Training    [place] Level (Toileting) toileting skills;adjust/manage clothing;minimum assist (75% patient effort);perform perineal hygiene;standby assist  -     Position (Toileting) unsupported sitting;supported standing  -               User Key  (r) = Recorded By, (t) = Taken By, (c) = Cosigned By      Initials Name Provider Type    Nalini Spears OTR/L Occupational Therapist                   Obj/Interventions    No documentation.                  Goals/Plan    No documentation.                  Clinical Impression       Row Name 01/24/24 0959          Pain Assessment    Pretreatment Pain Rating 0/10 - no pain  -LS     Posttreatment Pain Rating 0/10 - no pain  -LS     Pre/Posttreatment Pain Comment Pt does verbalize having pain in LLE when mobilizing  -LS      Pain Intervention(s) Repositioned;Ambulation/increased activity  -       Row Name 01/24/24 0959          Plan of Care Review    Plan of Care Reviewed With patient;daughter  -     Progress improving  -     Outcome Evaluation OT tx completed. Pt seated in recliner upon therapist arrival; A&O to person; No c/o pain at rest. Pt performed sit>stand utilizing rwx with CGA. Pt ambulated from bed<>BR utilziing rwx with CGA and frequent verbal cues for positioning of AD and sequencing. Pt performed sit<>stand toilet transfer utilizing grab bar and rwx with Min A and verbal cues. Pt performed clothing management in standing with Min A and shara hygiene while seated with SBA and intermittent verbal cues for sequencing. Pt continues to benefit from skilled OT intervention in order to address remaining deficits in fxl mobility, fxl activity tolerance, balance, strength, and use of adaptive techniques/equipment during performance of BADLs. Recommend SNF at discharge.  -       Row Name 01/24/24 0959          Therapy Plan Review/Discharge Plan (OT)    Anticipated Discharge Disposition (OT) skilled nursing facility  -       Row Name 01/24/24 0959          Positioning and Restraints    Pre-Treatment Position sitting in chair/recliner  -     Post Treatment Position chair  -LS     In Chair reclined;call light within reach;encouraged to call for assist;exit alarm on;legs elevated;with family/caregiver  -               User Key  (r) = Recorded By, (t) = Taken By, (c) = Cosigned By      Initials Name Provider Type    Nalini Spears, OTR/L Occupational Therapist                   Outcome Measures       Row Name 01/24/24 0959          How much help from another is currently needed...    Putting on and taking off regular lower body clothing? 2  -LS     Bathing (including washing, rinsing, and drying) 3  -LS     Toileting (which includes using toilet bed pan or urinal) 3  -LS     Putting on and taking off regular upper body  clothing 3  -LS     Taking care of personal grooming (such as brushing teeth) 3  -LS     Eating meals 4  -LS     AM-PAC 6 Clicks Score (OT) 18  -LS       Row Name 01/24/24 0959          Functional Assessment    Outcome Measure Options AM-PAC 6 Clicks Daily Activity (OT)  -LS               User Key  (r) = Recorded By, (t) = Taken By, (c) = Cosigned By      Initials Name Provider Type    LS Nalini Barkley OTR/L Occupational Therapist                    Occupational Therapy Education       Title: PT OT SLP Therapies (In Progress)       Topic: Occupational Therapy (In Progress)       Point: ADL training (In Progress)       Description:   Instruct learner(s) on proper safety adaptation and remediation techniques during self care or transfers.   Instruct in proper use of assistive devices.                  Learning Progress Summary             Patient Acceptance, E, VU,NR by  at 1/24/2024 1300    Acceptance, E, NR by  at 1/24/2024 0113    Acceptance, E, NR by  at 1/22/2024 2243    Acceptance, E, VU,NR by LS at 1/20/2024 1103   Significant Other Acceptance, E, NR by  at 1/24/2024 0113    Acceptance, E, NR by MK at 1/22/2024 2243                         Point: Home exercise program (In Progress)       Description:   Instruct learner(s) on appropriate technique for monitoring, assisting and/or progressing therapeutic exercises/activities.                  Learning Progress Summary             Patient Acceptance, E, NR by  at 1/24/2024 0113    Acceptance, E, NR by  at 1/22/2024 2243   Significant Other Acceptance, E, NR by MK at 1/24/2024 0113    Acceptance, E, NR by MK at 1/22/2024 2243                         Point: Precautions (In Progress)       Description:   Instruct learner(s) on prescribed precautions during self-care and functional transfers.                  Learning Progress Summary             Patient Acceptance, E, VU,NR by LS at 1/24/2024 1300    Acceptance, E, NR by  at 1/24/2024 0113     Acceptance, E, NR by  at 1/22/2024 2243    Acceptance, E, VU,NR by  at 1/20/2024 1103   Significant Other Acceptance, E, NR by  at 1/24/2024 0113    Acceptance, E, NR by  at 1/22/2024 2243                         Point: Body mechanics (In Progress)       Description:   Instruct learner(s) on proper positioning and spine alignment during self-care, functional mobility activities and/or exercises.                  Learning Progress Summary             Patient Acceptance, E, VU,NR by  at 1/24/2024 1300    Acceptance, E, NR by  at 1/24/2024 0113    Acceptance, E, NR by  at 1/22/2024 2243    Acceptance, E, VU,NR by  at 1/20/2024 1103   Significant Other Acceptance, E, NR by  at 1/24/2024 0113    Acceptance, E, NR by  at 1/22/2024 2243                                         User Key       Initials Effective Dates Name Provider Type Discipline     06/20/22 -  Nalini Barkley OTR/L Occupational Therapist OT     04/25/23 -  Shravan Larsen RN Registered Nurse Nurse                  OT Recommendation and Plan  Planned Therapy Interventions (OT): activity tolerance training, functional balance retraining, occupation/activity based interventions, ROM/therapeutic exercise, strengthening exercise, transfer/mobility retraining, patient/caregiver education/training, adaptive equipment training, BADL retraining  Therapy Frequency (OT): 5 times/wk  Plan of Care Review  Plan of Care Reviewed With: patient, daughter  Progress: improving  Outcome Evaluation: OT tx completed. Pt seated in recliner upon therapist arrival; A&O to person; No c/o pain at rest. Pt performed sit>stand utilizing rwx with CGA. Pt ambulated from bed<>BR utilziing rwx with CGA and frequent verbal cues for positioning of AD and sequencing. Pt performed sit<>stand toilet transfer utilizing grab bar and rwx with Min A and verbal cues. Pt performed clothing management in standing with Min A and shara hygiene while seated with SBA and intermittent  verbal cues for sequencing. Pt continues to benefit from skilled OT intervention in order to address remaining deficits in fxl mobility, fxl activity tolerance, balance, strength, and use of adaptive techniques/equipment during performance of BADLs. Recommend SNF at discharge.     Time Calculation:         Time Calculation- OT       Row Name 01/24/24 0959             Time Calculation- OT    OT Start Time 0959  -LS      OT Stop Time 1028  -LS      OT Time Calculation (min) 29 min  -LS      Total Timed Code Minutes- OT 29 minute(s)  -LS      OT Received On 01/24/24  -                User Key  (r) = Recorded By, (t) = Taken By, (c) = Cosigned By      Initials Name Provider Type    LS Nalini Barkley OTR/L Occupational Therapist                  Therapy Charges for Today       Code Description Service Date Service Provider Modifiers Qty    19912770904 HC OT SELF CARE/MGMT/TRAIN EA 15 MIN 1/24/2024 Nalini Barkley OTR/L GO 2                 Nalini Barkley OTR/SUN  1/24/2024

## 2024-01-24 NOTE — PLAN OF CARE
Goal Outcome Evaluation:  Plan of Care Reviewed With: patient, daughter        Progress: improving  Outcome Evaluation: OT tx completed. Pt seated in recliner upon therapist arrival; A&O to person; No c/o pain at rest. Pt performed sit>stand utilizing rwx with CGA. Pt ambulated from bed<>BR utilziing rwx with CGA and frequent verbal cues for positioning of AD and sequencing. Pt performed sit<>stand toilet transfer utilizing grab bar and rwx with Min A and verbal cues. Pt performed clothing management in standing with Min A and shara hygiene while seated with SBA and intermittent verbal cues for sequencing. Pt continues to benefit from skilled OT intervention in order to address remaining deficits in fxl mobility, fxl activity tolerance, balance, strength, and use of adaptive techniques/equipment during performance of BADLs. Recommend SNF at discharge.      Anticipated Discharge Disposition (OT): skilled nursing facility

## 2024-01-25 LAB — BACTERIA SPEC AEROBE CULT: ABNORMAL

## 2024-01-25 PROCEDURE — 97535 SELF CARE MNGMENT TRAINING: CPT

## 2024-01-25 PROCEDURE — 97116 GAIT TRAINING THERAPY: CPT

## 2024-01-25 PROCEDURE — 97110 THERAPEUTIC EXERCISES: CPT

## 2024-01-25 RX ORDER — DOCUSATE SODIUM 100 MG/1
100 CAPSULE, LIQUID FILLED ORAL 2 TIMES DAILY
Status: DISCONTINUED | OUTPATIENT
Start: 2024-01-25 | End: 2024-01-26 | Stop reason: HOSPADM

## 2024-01-25 RX ADMIN — TRAZODONE HYDROCHLORIDE 25 MG: 50 TABLET ORAL at 20:00

## 2024-01-25 RX ADMIN — Medication 10 ML: at 20:00

## 2024-01-25 RX ADMIN — ACETAMINOPHEN 650 MG: 325 TABLET, FILM COATED ORAL at 15:58

## 2024-01-25 RX ADMIN — DOCUSATE SODIUM 100 MG: 100 CAPSULE, LIQUID FILLED ORAL at 20:00

## 2024-01-25 RX ADMIN — CALCIUM 1000 MG: 500 TABLET ORAL at 08:46

## 2024-01-25 NOTE — THERAPY TREATMENT NOTE
Acute Care - Physical Therapy Treatment Note  [facility]     Patient Name: Corinne Rivera  : 1931  MRN: [id number]  Today's Date: 2024      Visit Dx:     ICD-10-CM ICD-9-CM   1. Ground-level fall  W18.30XA E888.9   2. Closed fracture of left pubis, unspecified portion of pubis, initial encounter  S32.502A 808.2   3. Ambulatory dysfunction  R26.2 719.7   4. Impaired mobility [Z74.09]  Z74.09 799.89     Patient Active Problem List   Diagnosis    Asymptomatic postmenopausal status    Hyponatremia    Hypertension    Acute metabolic encephalopathy    Fever    Thrombocytopenia    Elevated liver enzymes    Pelvic fracture     Past Medical History:   Diagnosis Date    Breast cancer     right     Hx of radiation therapy     Hypertension     Vitamin D deficiency      Past Surgical History:   Procedure Laterality Date    APPENDECTOMY      BREAST BIOPSY      BREAST LUMPECTOMY Right 1995    BREAST LUMPECTOMY Right     HYSTERECTOMY       PT Assessment (last 12 hours)       PT Evaluation and Treatment       Row Name 24 1000          Physical Therapy Time and Intention    Subjective Information no complaints  -KJ     Document Type therapy note (daily note)  -KJ     Mode of Treatment physical therapy  -KJ     Patient Effort good  -KJ     Comment baseline dementia  -KJ       Row Name 24 1000          General Information    Existing Precautions/Restrictions fall  WBAT LLE  -KJ       Row Name 24 1000          Pain Scale: FACES Pre/Post-Treatment    Pain: FACES Scale, Pretreatment 0-->no hurt  -KJ     Posttreatment Pain Rating 4-->hurts little more  -KJ     Pre/Posttreatment Pain Comment L groin  -KJ       Row Name 24 1000          Bed Mobility    Comment, (Bed Mobility) sitting on BSC  -KJ       Row Name 24 1000          Sit-Stand Transfer    Sit-Stand [place] (Transfers) verbal cues;contact guard;minimum assist (75% patient effort)  -KJ     Assistive Device (Sit-Stand Transfers)  walker, front-wheeled  -KJ       Row Name 01/25/24 1000          Stand-Sit Transfer    Stand-Sit [place] (Transfers) contact guard  -KJ       Row Name 01/25/24 1000          Gait/Stairs (Locomotion)    [place] Level (Gait) supervision;contact guard;minimum assist (75% patient effort)  -KJ     Assistive Device (Gait) walker, front-wheeled  -KJ     Distance in Feet (Gait) 30' x 2  -KJ     Deviations/Abnormal Patterns (Gait) antalgic  -KJ       Row Name 01/25/24 1000          Aerobic Exercise    Comment, Aerobic Exercise (Therapeutic Exercise) AROM BLE  -KJ       Row Name 01/25/24 1000          Positioning and Restraints    Pre-Treatment Position bedside commode  -KJ     In Chair reclined;call light within reach;exit alarm on;with family/caregiver  -KJ               User Key  (r) = Recorded By, (t) = Taken By, (c) = Cosigned By      Initials Name Provider Type    Desire Burgos, PTA Physical Therapist Assistant                    Physical Therapy Education       Title: PT OT SLP Therapies (In Progress)       Topic: Physical Therapy (In Progress)       Point: Mobility training (In Progress)       Learning Progress Summary             Patient Acceptance, E, VU by KS at 1/24/2024 1507    Acceptance, E, NR by  at 1/24/2024 0113    Acceptance, E, VU by AE at 1/23/2024 1102    Comment: t/f's    Acceptance, E, NR by  at 1/22/2024 2243    Acceptance, E, NL by MS at 1/20/2024 0945    Comment: role of PT in her care   Family Acceptance, E, VU by KS at 1/24/2024 1507   Significant Other Acceptance, E, NR by  at 1/24/2024 0113    Acceptance, E, NR by  at 1/22/2024 2243                         Point: Home exercise program (In Progress)       Learning Progress Summary             Patient Acceptance, E, VU by KS at 1/24/2024 1507    Acceptance, E, NR by  at 1/24/2024 0113    Acceptance, E, NR by  at 1/22/2024 2243   Family Acceptance, E, VU by KS at 1/24/2024 1507   Significant Other Acceptance, E, NR by   at 1/24/2024 0113    Acceptance, E, NR by MK at 1/22/2024 2243                         Point: Body mechanics (In Progress)       Learning Progress Summary             Patient Acceptance, E, VU by KS at 1/24/2024 1507    Acceptance, E, NR by MK at 1/24/2024 0113    Acceptance, E, NR by MK at 1/22/2024 2243   Family Acceptance, E, VU by KS at 1/24/2024 1507   Significant Other Acceptance, E, NR by MK at 1/24/2024 0113    Acceptance, E, NR by MK at 1/22/2024 2243                         Point: Precautions (In Progress)       Learning Progress Summary             Patient Acceptance, E, VU by KS at 1/24/2024 1507    Acceptance, E, NR by MK at 1/24/2024 0113    Acceptance, E, NR by MK at 1/22/2024 2243    Acceptance, E, NL by MS at 1/20/2024 0945    Comment: role of PT in her care   Family Acceptance, E, VU by KS at 1/24/2024 1507   Significant Other Acceptance, E, NR by MK at 1/24/2024 0113    Acceptance, E, NR by MK at 1/22/2024 2243                                         User Key       Initials Effective Dates Name Provider Type Discipline    AE 02/03/23 -  Patti Pereira, PTA Physical Therapist Assistant PT    MS 07/11/23 -  Shivani Santos, PT, DPT, NCS Physical Therapist PT    KS 02/27/23 -  Kathleen Nicole RN Registered Nurse Nurse     04/25/23 -  Shravan Larsen RN Registered Nurse Nurse                  PT Recommendation and Plan     Plan of Care Reviewed With: patient  Progress: improving  Outcome Evaluation: PT tx completed. Pt sitting on BSC, c/o minimal pn during ambulation L groin. Follows all direction with cues and time. Sit<>stand Cg/Thiago, amb short distances with r wx Thiago at times for balance due to posterior lean. Cues for safety and gait mechanics and use of r wx. Recommend cont PT   Outcome Measures       Row Name 01/25/24 1000 01/24/24 1300 01/23/24 1100       How much help from another person do you currently need...    Turning from your back to your side while in flat bed without using  bedrails? 3  -KJ 3  -KJ 3  -AE    Moving from lying on back to sitting on the side of a flat bed without bedrails? 3  -KJ 3  -KJ 3  -AE    Moving to and from a bed to a chair (including a wheelchair)? 3  -KJ 3  -KJ 3  -AE    Standing up from a chair using your arms (e.g., wheelchair, bedside chair)? 3  -KJ 3  -KJ 3  -AE    Climbing 3-5 steps with a railing? 3  -KJ 3  -KJ 2  -AE    To walk in hospital room? 3  -KJ 3  -KJ 2  -AE    AM-PAC 6 Clicks Score (PT) 18  -KJ 18  -KJ 16  -AE    Highest Level of Mobility Goal 6 --> Walk 10 steps or more  -KJ 6 --> Walk 10 steps or more  -KJ 5 --> Static standing  -AE       Functional Assessment    Outcome Measure Options AM-PAC 6 Clicks Basic Mobility (PT)  -KJ AM-PAC 6 Clicks Basic Mobility (PT)  -KJ AM-PAC 6 Clicks Basic Mobility (PT)  -AE      Row Name 01/22/24 1300             How much help from another person do you currently need...    Turning from your back to your side while in flat bed without using bedrails? 3  -KJ      Moving from lying on back to sitting on the side of a flat bed without bedrails? 3  -KJ      Moving to and from a bed to a chair (including a wheelchair)? 3  -KJ      Standing up from a chair using your arms (e.g., wheelchair, bedside chair)? 3  -KJ      Climbing 3-5 steps with a railing? 2  -KJ      To walk in hospital room? 2  -KJ      AM-PAC 6 Clicks Score (PT) 16  -KJ      Highest Level of Mobility Goal 5 --> Static standing  -KJ         Functional Assessment    Outcome Measure Options AM-PAC 6 Clicks Basic Mobility (PT)  -KJ                User Key  (r) = Recorded By, (t) = Taken By, (c) = Cosigned By      Initials Name Provider Type    AE Patti Pereira, PTA Physical Therapist Assistant    Desire Burgos, ELYSSA Physical Therapist Assistant                     Time Calculation:    PT Charges       Row Name 01/25/24 1019             Time Calculation    Start Time 1000  -KJ      Stop Time 1023  -KJ      Time Calculation (min) 23 min  -KJ      PT  Received On 01/25/24  -KJ      PT Goal Re-Cert Due Date 01/30/24  -KJ         Time Calculation- PT    Total Timed Code Minutes- PT 23 minute(s)  -KJ                User Key  (r) = Recorded By, (t) = Taken By, (c) = Cosigned By      Initials Name Provider Type    Desire Burgos, ELYSSA Physical Therapist Assistant                  Therapy Charges for Today       Code Description Service Date Service Provider Modifiers Qty    16236048240 HC GAIT TRAINING EA 15 MIN 1/24/2024 Desire Diaz, PTA GP 1    22450381466 HC PT THERAPEUTIC ACT EA 15 MIN 1/24/2024 Desire Diaz, PTA GP 1    01219533686 HC GAIT TRAINING EA 15 MIN 1/25/2024 Desire Daiz, PTA GP 1    08079642458 HC PT THER PROC EA 15 MIN 1/25/2024 Desire Diaz, PTA GP 1            PT G-Codes  Outcome Measure Options: AM-PAC 6 Clicks Basic Mobility (PT)  AM-PAC 6 Clicks Score (PT): 18  AM-PAC 6 Clicks Score (OT): 18    Desire Diaz PTA  1/25/2024

## 2024-01-25 NOTE — THERAPY TREATMENT NOTE
"Patient Name: Corinne Rivera  : 1931    MRN: [id number]                              Today's Date: 2024       Admit Date: 2024    Visit Dx: Therapist utilized gait belt, applied non-slipped socks, provided fall risk education/prevention, & facilitated muscle strengthening PRN to reduce patient falls risk during this session.      ICD-10-CM ICD-9-CM   1. Ground-level fall  W18.30XA E888.9   2. Closed fracture of left pubis, unspecified portion of pubis, initial encounter  S32.502A 808.2   3. Ambulatory dysfunction  R26.2 719.7   4. Impaired mobility [Z74.09]  Z74.09 799.89     Patient Active Problem List   Diagnosis    Asymptomatic postmenopausal status    Hyponatremia    Hypertension    Acute metabolic encephalopathy    Fever    Thrombocytopenia    Elevated liver enzymes    Pelvic fracture     Past Medical History:   Diagnosis Date    Breast cancer     right     Hx of radiation therapy     Hypertension     Vitamin D deficiency      Past Surgical History:   Procedure Laterality Date    APPENDECTOMY      BREAST BIOPSY      BREAST LUMPECTOMY Right 1995    BREAST LUMPECTOMY Right     HYSTERECTOMY        General Information       Row Name 24 1130          OT Time and Intention    Document Type therapy note (daily note)  -MT     Mode of Treatment occupational therapy  -MT       Row Name 24 1130          General Information    Patient Profile Reviewed yes  -MT     Existing Precautions/Restrictions fall  WBAT LLE  -MT       Row Name 24 1130          Cognition    Orientation Status (Cognition) oriented to;person;situation  of why she hurts \"I fell and broke something\"  -MT       Row Name 24 1130          Safety Issues, Functional Mobility    Impairments Affecting Function (Mobility) cognition;strength;pain  -MT               User Key  (r) = Recorded By, (t) = Taken By, (c) = Cosigned By      Initials Name Provider Type    MT Maricarmen Castillo COTA Occupational Therapist Assistant " "                    Mobility/ADL's       Row Name 01/25/24 1130          Bed Mobility    Comment, (Bed Mobility) in recliner  -MT       Row Name 01/25/24 1130          Transfers    Transfers sit-stand transfer;stand-sit transfer  -MT       Row Name 01/25/24 1130          Sit-Stand Transfer    Sit-Stand [place] (Transfers) verbal cues;contact guard  -MT     Assistive Device (Sit-Stand Transfers) walker, front-wheeled  -MT     Comment, (Sit-Stand Transfer) Needed cues to push from chair, with this pt CGA  -MT       Row Name 01/25/24 1130          Stand-Sit Transfer    Stand-Sit [place] (Transfers) contact guard  -MT       Row Name 01/25/24 1130          Toilet Transfer    Type (Toilet Transfer) sit-stand;stand-sit  -MT     [place] Level (Toilet Transfer) contact guard  -MT     Assistive Device (Toilet Transfer) grab bars/safety frame;commode  -MT       Row Name 01/25/24 1130          Functional Mobility    Functional Mobility- Ind. Level contact guard assist  -MT     Functional Mobility- Comment <>BR Thiago via RW  -MT       Row Name 01/25/24 1130          Activities of Daily Living    BADL Assessment/Intervention toileting  -MT       Row Name 01/25/24 1130          Mobility    Extremity Weight-bearing Status left lower extremity  -MT     Left Lower Extremity (Weight-bearing Status) weight-bearing as tolerated (WBAT)  -MT       Row Name 01/25/24 1130          Toileting Assessment/Training    Comment, (Toileting) increased time and s/u or toilet paper  -MT               User Key  (r) = Recorded By, (t) = Taken By, (c) = Cosigned By      Initials Name Provider Type    MT Maricarmen Castillo COTA Occupational Therapist Assistant                   Obj/Interventions    No documentation.                  Goals/Plan    No documentation.                  Clinical Impression       Row Name 01/25/24 1130          Pain Assessment    Pre/Posttreatment Pain Comment did not state level, just \"ohh\" and facial grimace 4 " with mobility  -MT       Row Name 01/25/24 1130          Therapy Plan Review/Discharge Plan (OT)    Anticipated Discharge Disposition (OT) skilled nursing facility  -MT       Row Name 01/25/24 1130          Positioning and Restraints    Pre-Treatment Position sitting in chair/recliner  -MT     Post Treatment Position chair  -MT     In Chair sitting;call light within reach;encouraged to call for assist;exit alarm on;legs elevated  -MT               User Key  (r) = Recorded By, (t) = Taken By, (c) = Cosigned By      Initials Name Provider Type    MT Maricarmen Castillo COTA Occupational Therapist Assistant                   Outcome Measures       Row Name 01/25/24 1130          How much help from another is currently needed...    Putting on and taking off regular lower body clothing? 2  -MT     Bathing (including washing, rinsing, and drying) 3  -MT     Toileting (which includes using toilet bed pan or urinal) 3  -MT     Putting on and taking off regular upper body clothing 3  -MT     Taking care of personal grooming (such as brushing teeth) 3  -MT     Eating meals 4  -MT     AM-PAC 6 Clicks Score (OT) 18  -MT       Row Name 01/25/24 1000 01/25/24 0811       How much help from another person do you currently need...    Turning from your back to your side while in flat bed without using bedrails? 3  -KJ 3  -MARK    Moving from lying on back to sitting on the side of a flat bed without bedrails? 3  -KJ 3  -MARK    Moving to and from a bed to a chair (including a wheelchair)? 3  -KJ 3  -MARK    Standing up from a chair using your arms (e.g., wheelchair, bedside chair)? 3  -KJ 3  -MARK    Climbing 3-5 steps with a railing? 3  -KJ 3  -MARK    To walk in hospital room? 3  -KJ 2  -MARK    AM-PAC 6 Clicks Score (PT) 18  -KJ 17  -MARK    Highest Level of Mobility Goal 6 --> Walk 10 steps or more  -KJ 5 --> Static standing  -MARK      Row Name 01/25/24 1000          Functional Assessment    Outcome Measure Options AM-PAC 6 Clicks Basic Mobility  (PT)  -CRISTIANE               User Key  (r) = Recorded By, (t) = Taken By, (c) = Cosigned By      Initials Name Provider Type    Desire Burgos PTA Physical Therapist Assistant    Maricarmen Thapa COTA Occupational Therapist Assistant    Effie Gomez, RN Registered Nurse                    Occupational Therapy Education       Title: PT OT SLP Therapies (In Progress)       Topic: Occupational Therapy (In Progress)       Point: ADL training (In Progress)       Description:   Instruct learner(s) on proper safety adaptation and remediation techniques during self care or transfers.   Instruct in proper use of assistive devices.                  Learning Progress Summary             Patient Acceptance, E, VU by KS at 1/24/2024 1507    Acceptance, E, VU,NR by  at 1/24/2024 1300    Acceptance, E, NR by  at 1/24/2024 0113    Acceptance, E, NR by  at 1/22/2024 2243    Acceptance, E, VU,NR by LS at 1/20/2024 1103   Family Acceptance, E, VU by KS at 1/24/2024 1507   Significant Other Acceptance, E, NR by  at 1/24/2024 0113    Acceptance, E, NR by  at 1/22/2024 2243                         Point: Home exercise program (In Progress)       Description:   Instruct learner(s) on appropriate technique for monitoring, assisting and/or progressing therapeutic exercises/activities.                  Learning Progress Summary             Patient Acceptance, E, VU by KS at 1/24/2024 1507    Acceptance, E, NR by  at 1/24/2024 0113    Acceptance, E, NR by  at 1/22/2024 2243   Family Acceptance, E, VU by KS at 1/24/2024 1507   Significant Other Acceptance, E, NR by  at 1/24/2024 0113    Acceptance, E, NR by  at 1/22/2024 2243                         Point: Precautions (In Progress)       Description:   Instruct learner(s) on prescribed precautions during self-care and functional transfers.                  Learning Progress Summary             Patient Acceptance, E, VU by KS at 1/24/2024 1507    Acceptance, E,  VU,NR by LS at 1/24/2024 1300    Acceptance, E, NR by MK at 1/24/2024 0113    Acceptance, E, NR by MK at 1/22/2024 2243    Acceptance, E, VU,NR by LS at 1/20/2024 1103   Family Acceptance, E, VU by KS at 1/24/2024 1507   Significant Other Acceptance, E, NR by MK at 1/24/2024 0113    Acceptance, E, NR by MK at 1/22/2024 2243                         Point: Body mechanics (In Progress)       Description:   Instruct learner(s) on proper positioning and spine alignment during self-care, functional mobility activities and/or exercises.                  Learning Progress Summary             Patient Acceptance, E, VU by KS at 1/24/2024 1507    Acceptance, E, VU,NR by LS at 1/24/2024 1300    Acceptance, E, NR by MK at 1/24/2024 0113    Acceptance, E, NR by MK at 1/22/2024 2243    Acceptance, E, VU,NR by LS at 1/20/2024 1103   Family Acceptance, E, VU by KS at 1/24/2024 1507   Significant Other Acceptance, E, NR by MK at 1/24/2024 0113    Acceptance, E, NR by MK at 1/22/2024 2243                                         User Key       Initials Effective Dates Name Provider Type Discipline    KS 02/27/23 -  Kathleen Nicole, RN Registered Nurse Nurse     06/20/22 -  Nalini Barkley OTR/L Occupational Therapist OT     04/25/23 -  Shravan Larsen, ELIZABETH Registered Nurse Nurse                  OT Recommendation and Plan     Plan of Care Review  Plan of Care Reviewed With: patient  Progress: improving  Outcome Evaluation: Needed cues to push from chair, with this pt CGA to stand. Fxl mobility in room <> BR with increased time and cues for RW use and navigation ultimately needing Thiago for safety. Also required cues to keep RW closer but pt is forgetful. Needs cues for sequencing each step of ADL task during toileting, grooming and clothing management. Pt would benefit from continued rehab, most limited by pain level     Time Calculation:         Time Calculation- OT       Row Name 01/25/24 2583             Time Calculation- OT    OT  Start Time 1130  -MT      OT Stop Time 1154  -MT      OT Time Calculation (min) 24 min  -MT      Total Timed Code Minutes- OT 24 minute(s)  -MT      OT Received On 01/25/24  -MT         Timed Charges    69103 - OT Self Care/Mgmt Minutes 24  -MT         Total Minutes    Timed Charges Total Minutes 24  -MT       Total Minutes 24  -MT                User Key  (r) = Recorded By, (t) = Taken By, (c) = Cosigned By      Initials Name Provider Type    MT Maricarmen Castillo COTA Occupational Therapist Assistant                  Therapy Charges for Today       Code Description Service Date Service Provider Modifiers Qty    53407984595 HC OT SELF CARE/MGMT/TRAIN EA 15 MIN 1/25/2024 Maricarmen Castillo COTA GO 2                 ARIANA Mead  1/25/2024

## 2024-01-25 NOTE — PLAN OF CARE
Goal Outcome Evaluation:  Plan of Care Reviewed With: patient        Progress: improving  Outcome Evaluation: PT tx completed. Pt sitting on BSC, c/o minimal pn during ambulation L groin. Follows all direction with cues and time. Sit<>stand Cg/Thiago, amb short distances with r wx Thiago at times for balance due to posterior lean. Cues for safety and gait mechanics and use of r wx. Recommend cont PT

## 2024-01-25 NOTE — PLAN OF CARE
Goal Outcome Evaluation:  Plan of Care Reviewed With: patient        Progress: improving  Outcome Evaluation: Needed cues to push from chair, with this pt CGA to stand. Fxl mobility in room <> BR with increased time and cues for RW use and navigation ultimately needing Thiago for safety. Also required cues to keep RW closer but pt is forgetful. Needs cues for sequencing each step of ADL task during toileting, grooming and clothing management. Pt would benefit from continued rehab, most limited by pain level      Anticipated Discharge Disposition (OT): skilled nursing facility

## 2024-01-25 NOTE — PLAN OF CARE
Problem: Adult Inpatient Plan of Care  Goal: Plan of Care Review  Outcome: Ongoing, Progressing  Goal: Patient-Specific Goal (Individualized)  Outcome: Ongoing, Progressing  Goal: Absence of Hospital-Acquired Illness or Injury  Outcome: Ongoing, Progressing  Intervention: Prevent Skin Injury  Recent Flowsheet Documentation  Taken 1/25/2024 1600 by Marilin Diaz RN  Body Position: position changed independently  Taken 1/25/2024 1200 by Marilin Diaz RN  Body Position: position changed independently  Intervention: Prevent and Manage VTE (Venous Thromboembolism) Risk  Recent Flowsheet Documentation  Taken 1/25/2024 1600 by Marilin Diaz RN  Activity Management: up in chair  Taken 1/25/2024 1400 by Marilin Diaz RN  Activity Management: up in chair  Taken 1/25/2024 1200 by Marilin Diaz RN  Activity Management: activity encouraged  Goal: Optimal Comfort and Wellbeing  Outcome: Ongoing, Progressing  Goal: Readiness for Transition of Care  Outcome: Ongoing, Progressing     Problem: Skin Injury Risk Increased  Goal: Skin Health and Integrity  Outcome: Ongoing, Progressing  Intervention: Optimize Skin Protection  Recent Flowsheet Documentation  Taken 1/25/2024 1600 by Marilin Diaz RN  Head of Bed (HOB) Positioning: HOB elevated  Taken 1/25/2024 1200 by Marilin Diaz RN  Head of Bed (HOB) Positioning: HOB at 30-45 degrees     Problem: Fall Injury Risk  Goal: Absence of Fall and Fall-Related Injury  Outcome: Ongoing, Progressing   Goal Outcome Evaluation:

## 2024-01-25 NOTE — PLAN OF CARE
Goal Outcome Evaluation:           Progress: no change  Outcome Evaluation: Patient oriented to person only with VSS. Pivoting with assistance to BSC. Rest has been decent during the night. No c/o pain but grimaces with transfering. No concerns expressed and no major changes during the night.

## 2024-01-25 NOTE — PROGRESS NOTES
Chief Complaint: Hip pain      Interval History:     She rested much better last night.  Daughter has remained at her bedside.  She has been able to get up and go to the bathroom.  She received 1 dose of Tylenol after getting up last night.  She denies any pain right now.  Blood pressure has stabilized.  Labs are stable.    Review of Systems:   General ROS: negative for - chills or fever  Respiratory ROS: no cough, shortness of breath, or wheezing  Cardiovascular ROS: no chest pain or dyspnea on exertion  Gastrointestinal ROS: negative for - abdominal pain, diarrhea or nausea/vomiting       Vital Signs  Temp:  [97.5 °F (36.4 °C)-98.1 °F (36.7 °C)] 98.1 °F (36.7 °C)  Heart Rate:  [82-88] 88  Resp:  [16-18] 16  BP: (113-159)/(70-80) 137/80    Intake/Output Summary (Last 24 hours) at 1/25/2024 0743  Last data filed at 1/24/2024 1300  Gross per 24 hour   Intake 720 ml   Output --   Net 720 ml       Physical Exam:     General Appearance:    Alert, cooperative, in no acute distress   Head:    N/A   Throat:   N/A   Neck:   N/A   Lungs:     Clear to auscultation,respirations regular, even and                  unlabored    Heart:    Regular rhythm and normal rate, normal S1 and S2, no            murmur, no gallop, no rub   Abdomen:     Normal bowel sounds, no masses, no organomegaly, soft        non-tender, non-distended, no guarding, no rebound                tenderness   Extremities:   No edema, no cyanosis, no clubbing   Pulses:   N/A   Skin:   N/A   Lymph nodes:   N/A   Neurologic:   N/A          Lab Review:       Lab Results (last 24 hours)       Procedure Component Value Units Date/Time    Urine Culture - Urine, Urine, Clean Catch [[id number]]  (Abnormal) Collected: 01/22/24 1649    Specimen: Urine, Clean Catch Updated: 01/24/24 1131     Urine Culture >100,000 CFU/mL Klebsiella pneumoniae ssp pneumoniae    Narrative:      Colonization of the urinary tract without infection is common. Treatment is discouraged unless  the patient is symptomatic, pregnant, or undergoing an invasive urologic procedure.          Cultures:    Urine Culture   Date Value Ref Range Status   01/22/2024 (A)  Preliminary    >100,000 CFU/mL Klebsiella pneumoniae ssp pneumoniae       Radiology Review:  Imaging Results (Last 24 Hours)       ** No results found for the last 24 hours. **                     ASSESSMENT:      Pelvic fracture    Hyponatremia    Hypertension      PLAN:    1.  Continue with nightly trazodone.  2.  Continue Rocephin for UTI.  3.  Continue with PT/OT.  4.  Plan for discharge to skilled nursing facility tomorrow.    Further orders per Dr. Scott.      Azael Zhao, APRN  01/25/24  07:43 CST        Part of this note may be an electronic transcription/translation of spoken language to printed text using the Dragon Dictation System.

## 2024-01-26 VITALS
BODY MASS INDEX: 19.92 KG/M2 | OXYGEN SATURATION: 94 % | WEIGHT: 105.5 LBS | TEMPERATURE: 97.5 F | HEART RATE: 86 BPM | RESPIRATION RATE: 18 BRPM | DIASTOLIC BLOOD PRESSURE: 78 MMHG | HEIGHT: 61 IN | SYSTOLIC BLOOD PRESSURE: 146 MMHG

## 2024-01-26 PROCEDURE — 97535 SELF CARE MNGMENT TRAINING: CPT

## 2024-01-26 PROCEDURE — 97116 GAIT TRAINING THERAPY: CPT

## 2024-01-26 RX ORDER — TRAZODONE HYDROCHLORIDE 50 MG/1
25 TABLET ORAL NIGHTLY
Start: 2024-01-26

## 2024-01-26 RX ORDER — ACETAMINOPHEN 325 MG/1
650 TABLET ORAL EVERY 4 HOURS PRN
Start: 2024-01-26

## 2024-01-26 RX ORDER — PSEUDOEPHEDRINE HCL 30 MG
100 TABLET ORAL 2 TIMES DAILY
Qty: 60 CAPSULE | Refills: 0 | Status: SHIPPED | OUTPATIENT
Start: 2024-01-26

## 2024-01-26 RX ADMIN — DOCUSATE SODIUM 100 MG: 100 CAPSULE, LIQUID FILLED ORAL at 09:12

## 2024-01-26 RX ADMIN — Medication 10 ML: at 09:12

## 2024-01-26 RX ADMIN — CALCIUM 1000 MG: 500 TABLET ORAL at 09:11

## 2024-01-26 NOTE — CASE MANAGEMENT/SOCIAL WORK
Continued Stay Note  [facility]     Patient Name: Corinne Rivera  MRN: [id number]  Today's Date: 1/26/2024    Admit Date: 1/19/2024    Plan: [facility]   Discharge Plan       Row Name 01/26/24 1121       Plan    Plan [facility]    Patient/Family in Agreement with Plan yes    Final Discharge Disposition Code 03 - skilled nursing facility (SNF)    Final Note Pt is going to [facility] [phone] today. Faxed the d/c summary to [phone].                   Discharge Codes    No documentation.                 Expected Discharge Date and Time       Expected Discharge Date Expected Discharge Time    Jan 26, 2024               CRYSTAL Lucero

## 2024-01-26 NOTE — PLAN OF CARE
Goal Outcome Evaluation:  Plan of Care Reviewed With: patient        Progress: no change     Pt alert and oriented x4. VSS. No c/o pain. LYNCH. PPP. SCDs for VTE. , room air. Tolerating prescribed diet. Skin intact. Voiding via BSC. Up x 1 with walker, weight bearing as tolerated. Turn and reposition as needed. Last BM 1/24. Plans to d/c to [facility]. Call light within reach. Safety maintained. Plan of care ongoing. No neurovascular changes this shift.

## 2024-01-26 NOTE — THERAPY TREATMENT NOTE
Acute Care - Physical Therapy Treatment Note  [facility]     Patient Name: Corinne Rivera  : 1931  MRN: [id number]  Today's Date: 2024      Visit Dx:     ICD-10-CM ICD-9-CM   1. Ground-level fall  W18.30XA E888.9   2. Closed fracture of left pubis, unspecified portion of pubis, initial encounter  S32.502A 808.2   3. Ambulatory dysfunction  R26.2 719.7   4. Impaired mobility [Z74.09]  Z74.09 799.89     Patient Active Problem List   Diagnosis    Asymptomatic postmenopausal status    Hyponatremia    Hypertension    Acute metabolic encephalopathy    Fever    Thrombocytopenia    Elevated liver enzymes    Pelvic fracture     Past Medical History:   Diagnosis Date    Breast cancer     right     Hx of radiation therapy     Hypertension     Vitamin D deficiency      Past Surgical History:   Procedure Laterality Date    APPENDECTOMY      BREAST BIOPSY      BREAST LUMPECTOMY Right 1995    BREAST LUMPECTOMY Right     HYSTERECTOMY       PT Assessment (last 12 hours)       PT Evaluation and Treatment       [facility] Name 24 08          Physical Therapy Time and Intention    Subjective Information complains of;pain;weakness;fatigue  -     Document Type therapy note (daily note)  -     Mode of Treatment physical therapy  -[facility] Name 24 0806          General Information    Existing Precautions/Restrictions fall  WBAT LLE  -       Row Name 24 0806          Pain    Additional Documentation Pain Scale: Word Pre/Post-Treatment (Group)  -[facility] Name 24          Pain Scale: Word Pre/Post-Treatment    Pain: Word Scale, Pretreatment 0 - no pain  at rest  -     Posttreatment Pain Rating 6 - moderate-severe pain  with walking  -     Pain Location - Side/Orientation Left  -     Pain Location - groin  -       Row Name 24 08          Bed Mobility    Supine-Sit [place] (Bed Mobility) minimum assist (75% patient effort);verbal cues  -     Sit-Supine [place]  (Bed Mobility) --  chair  -       Row Name 01/26/24 0806          Sit-Stand Transfer    Sit-Stand [place] (Transfers) verbal cues;minimum assist (75% patient effort)  -     Assistive Device (Sit-Stand Transfers) walker, front-wheeled  -       Row Name 01/26/24 0806          Stand-Sit Transfer    Stand-Sit [place] (Transfers) contact guard  -       Row Name 01/26/24 0806          Toilet Transfer    [place] Level (Toilet Transfer) contact guard;minimum assist (75% patient effort);verbal cues  -       Row Name 01/26/24 0806          Gait/Stairs (Locomotion)    [place] Level (Gait) contact guard;minimum assist (75% patient effort);verbal cues  -     Assistive Device (Gait) walker, front-wheeled  -     Distance in Feet (Gait) 15,25  -     Deviations/Abnormal Patterns (Gait) antalgic  -       Row Name 01/26/24 0806          Positioning and Restraints    Pre-Treatment Position in bed  -     Post Treatment Position chair  -     In Chair notified nsg;reclined;call light within reach;encouraged to call for assist;with family/caregiver  -               User Key  (r) = Recorded By, (t) = Taken By, (c) = Cosigned By      Initials Name Provider Type     Patricia Schneider, PTA Physical Therapist Assistant                    Physical Therapy Education       Title: PT OT SLP Therapies (In Progress)       Topic: Physical Therapy (In Progress)       Point: Mobility training (In Progress)       Learning Progress Summary             Patient Acceptance, E, VU by KS at 1/24/2024 1507    Acceptance, E, NR by  at 1/24/2024 0113    Acceptance, E, VU by AE at 1/23/2024 1102    Comment: t/f's    Acceptance, E, NR by MOUSTAPHA at 1/22/2024 2243    Acceptance, E, NL by MS at 1/20/2024 0945    Comment: role of PT in her care   Family Acceptance, E, VU by KS at 1/24/2024 1507   Significant Other Acceptance, E, NR by  at 1/24/2024 0113    Acceptance, E, NR by  at 1/22/2024 2243                          Point: Home exercise program (In Progress)       Learning Progress Summary             Patient Acceptance, E, VU by KS at 1/24/2024 1507    Acceptance, E, NR by MK at 1/24/2024 0113    Acceptance, E, NR by MK at 1/22/2024 2243   Family Acceptance, E, VU by KS at 1/24/2024 1507   Significant Other Acceptance, E, NR by MK at 1/24/2024 0113    Acceptance, E, NR by MK at 1/22/2024 2243                         Point: Body mechanics (In Progress)       Learning Progress Summary             Patient Acceptance, E, VU by KS at 1/24/2024 1507    Acceptance, E, NR by MK at 1/24/2024 0113    Acceptance, E, NR by MK at 1/22/2024 2243   Family Acceptance, E, VU by KS at 1/24/2024 1507   Significant Other Acceptance, E, NR by MK at 1/24/2024 0113    Acceptance, E, NR by MK at 1/22/2024 2243                         Point: Precautions (In Progress)       Learning Progress Summary             Patient Acceptance, E, VU by KS at 1/24/2024 1507    Acceptance, E, NR by MK at 1/24/2024 0113    Acceptance, E, NR by  at 1/22/2024 2243    Acceptance, E, NL by MS at 1/20/2024 0945    Comment: role of PT in her care   Family Acceptance, E, VU by KS at 1/24/2024 1507   Significant Other Acceptance, E, NR by  at 1/24/2024 0113    Acceptance, E, NR by  at 1/22/2024 2243                                         User Key       Initials Effective Dates Name Provider Type Discipline    AE 02/03/23 -  Patti Pereira, PTA Physical Therapist Assistant PT    MS 07/11/23 -  Shivani Santos, PT, DPT, NCS Physical Therapist PT    KS 02/27/23 -  Kathleen Nicole, RN Registered Nurse Nurse     04/25/23 -  Shravan Larsen, ELIZABETH Registered Nurse Nurse                  PT Recommendation and Plan         Outcome Measures       Row Name 01/26/24 0800 01/25/24 1000 01/24/24 1300       How much help from another person do you currently need...    Turning from your back to your side while in flat bed without using bedrails? 3  -AH 3  -KJ 3  -KJ    Moving from  lying on back to sitting on the side of a flat bed without bedrails? 3  -AH 3  -KJ 3  -KJ    Moving to and from a bed to a chair (including a wheelchair)? 3  -AH 3  -KJ 3  -KJ    Standing up from a chair using your arms (e.g., wheelchair, bedside chair)? 3  -AH 3  -KJ 3  -KJ    Climbing 3-5 steps with a railing? 2  -AH 3  -KJ 3  -KJ    To walk in hospital room? 3  -AH 3  -KJ 3  -KJ    AM-PAC 6 Clicks Score (PT) 17  -AH 18  -KJ 18  -KJ    Highest Level of Mobility Goal 5 --> Static standing  -AH 6 --> Walk 10 steps or more  -KJ 6 --> Walk 10 steps or more  -KJ       Functional Assessment    Outcome Measure Options AM-PAC 6 Clicks Basic Mobility (PT)  -AH AM-PAC 6 Clicks Basic Mobility (PT)  -KJ AM-PAC 6 Clicks Basic Mobility (PT)  -KJ      Row Name 01/23/24 1100             How much help from another person do you currently need...    Turning from your back to your side while in flat bed without using bedrails? 3  -AE      Moving from lying on back to sitting on the side of a flat bed without bedrails? 3  -AE      Moving to and from a bed to a chair (including a wheelchair)? 3  -AE      Standing up from a chair using your arms (e.g., wheelchair, bedside chair)? 3  -AE      Climbing 3-5 steps with a railing? 2  -AE      To walk in hospital room? 2  -AE      AM-PAC 6 Clicks Score (PT) 16  -AE      Highest Level of Mobility Goal 5 --> Static standing  -AE         Functional Assessment    Outcome Measure Options AM-PAC 6 Clicks Basic Mobility (PT)  -AE                User Key  (r) = Recorded By, (t) = Taken By, (c) = Cosigned By      Initials Name Provider Type    AE Patti Pereira, PTA Physical Therapist Assistant    Patricia Donovan, PTA Physical Therapist Assistant    Desire Burgos, PTA Physical Therapist Assistant                     Time Calculation:    PT Charges       Row Name 01/26/24 0830             Time Calculation    Start Time 0806  -      Stop Time 0829  -      Time Calculation (min) 23 min  [facility]       PT Received On 01/26/24  -         Time Calculation- PT    Total Timed Code Minutes- PT 23 minute(s)  -AH         Timed Charges    34963 - Gait Training Minutes  23  -AH         Total Minutes    Timed Charges Total Minutes 23  -AH       Total Minutes 23  -AH                User Key  (r) = Recorded By, (t) = Taken By, (c) = Cosigned By      Initials Name Provider Type     Patricia Schneider PTA Physical Therapist Assistant                  Therapy Charges for Today       Code Description Service Date Service Provider Modifiers Qty    25678448904 HC GAIT TRAINING EA 15 MIN 1/26/2024 Patricia Schneider PTA GP 2            PT G-Codes  Outcome Measure Options: AM-PAC 6 Clicks Basic Mobility (PT)  AM-PAC 6 Clicks Score (PT): 17  AM-PAC 6 Clicks Score (OT): 18    Patricia Schneider PTA  1/26/2024

## 2024-01-26 NOTE — THERAPY DISCHARGE NOTE
Acute Care - Physical Therapy Discharge Summary  [facility]       Patient Name: Corinne Rivera  : 1931  MRN: [id number]    Today's Date: 2024                 Admit Date: 2024      PT Recommendation and Plan    Visit Dx:    ICD-10-CM ICD-9-CM   1. Ground-level fall  W18.30XA E888.9   2. Closed fracture of left pubis, unspecified portion of pubis, initial encounter  S32.502A 808.2   3. Ambulatory dysfunction  R26.2 719.7   4. Impaired mobility [Z74.09]  Z74.09 799.89        Outcome Measures       Row Name 24 0800 24 1000 24 1300       How much help from another person do you currently need...    Turning from your back to your side while in flat bed without using bedrails? 3  - 3  -KJ 3  -KJ    Moving from lying on back to sitting on the side of a flat bed without bedrails? 3  - 3  -KJ 3  -KJ    Moving to and from a bed to a chair (including a wheelchair)? 3  - 3  -KJ 3  -KJ    Standing up from a chair using your arms (e.g., wheelchair, bedside chair)? 3  - 3  -KJ 3  -KJ    Climbing 3-5 steps with a railing? 2  - 3  -KJ 3  -KJ    To walk in hospital room? 3  - 3  -KJ 3  -KJ    AM-PAC 6 Clicks Score (PT) 17  -AH 18  -KJ 18  -KJ    Highest Level of Mobility Goal 5 --> Static standing  - 6 --> Walk 10 steps or more  -KJ 6 --> Walk 10 steps or more  -KJ       Functional Assessment    Outcome Measure Options AM-PAC 6 Clicks Basic Mobility (PT)  -AH AM-PAC 6 Clicks Basic Mobility (PT)  -KJ AM-PAC 6 Clicks Basic Mobility (PT)  -KJ              User Key  (r) = Recorded By, (t) = Taken By, (c) = Cosigned By      Initials Name Provider Type     Patricia Schneider, PTA Physical Therapist Assistant    Desire Burgos, ELYSSA Physical Therapist Assistant                     PT Charges       Row Name 24 0830             Time Calculation    Start Time 806  -      Stop Time 829  -      Time Calculation (min) 23 min  -      PT Received On 24  -         Time  Calculation- PT    Total Timed Code Minutes- PT 23 minute(s)  -AH         Timed Charges    16054 - Gait Training Minutes  23  -AH         Total Minutes    Timed Charges Total Minutes 23  -AH       Total Minutes 23  -AH                User Key  (r) = Recorded By, (t) = Taken By, (c) = Cosigned By      Initials Name Provider Type     Patricia Schneider PTA Physical Therapist Assistant                     PT Rehab Goals       Row Name 01/26/24 1500             Bed Mobility Goal 1 (PT)    Activity/Assistive Device (Bed Mobility Goal 1, PT) bed mobility activities, all  -AB      [place] Level/Cues Needed (Bed Mobility Goal 1, PT) contact guard required;tactile cues required;verbal cues required  -AB      Time Frame (Bed Mobility Goal 1, PT) long term goal (LTG);by discharge  -AB      Progress/Outcomes (Bed Mobility Goal 1, PT) goal not met  -AB         Transfer Goal 1 (PT)    Activity/Assistive Device (Transfer Goal 1, PT) sit-to-stand/stand-to-sit;bed-to-chair/chair-to-bed  -AB      [place] Level/Cues Needed (Transfer Goal 1, PT) contact guard required  -AB      Time Frame (Transfer Goal 1, PT) long term goal (LTG);by discharge  -AB      Progress/Outcome (Transfer Goal 1, PT) goal not met  -AB         Gait Training Goal 1 (PT)    Activity/Assistive Device (Gait Training Goal 1, PT) gait (walking locomotion);decrease fall risk;diminish gait deviation;increase endurance/gait distance;improve balance and speed  -AB      [place] Level (Gait Training Goal 1, PT) contact guard required  -AB      Distance (Gait Training Goal 1, PT) 50ft  -AB      Time Frame (Gait Training Goal 1, PT) long term goal (LTG);by discharge  -AB      Progress/Outcome (Gait Training Goal 1, PT) goal not met  -AB                User Key  (r) = Recorded By, (t) = Taken By, (c) = Cosigned By      Initials Name Provider Type Discipline    Rhea Anand PTA Physical Therapist Assistant PT                        PT Discharge  Summary  Anticipated Discharge Disposition (PT): skilled nursing facility  Reason for Discharge: Discharge from facility  Outcomes Achieved: Refer to plan of care for updates on goals achieved  Discharge Destination: SNF      Rhea Jacobson, PTA   1/26/2024

## 2024-01-26 NOTE — PLAN OF CARE
Goal Outcome Evaluation:  Plan of Care Reviewed With: patient, daughter        Progress: improving  Outcome Evaluation: OT tx completed. Pt seated in recliner upon therapist arrival; A&O to person; No c/o pain at rest; Pt's daughter also present. Pt performed LB dressing while sitting/standing as appropriate with Min A and verbal cues for sequencing. Pt performed sit<>stand with CGA and verbal cues for sequencing. Pt ambulated from chair<>BR utilizing rwx with CGA and intemittent verbal cues for positioning of AD. Pt performed sit<>stand toilet transfer and all toileting tasks with CGA and verbal cues for sequencing and safety awareness. Pt discharging to SNF for continued rehab on this date.      Anticipated Discharge Disposition (OT): skilled nursing facility

## 2024-01-26 NOTE — DISCHARGE SUMMARY
DISCHARGE SUMMARY       Date of Admission: 1/19/2024  Date of Discharge:  1/26/2024  Primary Care Physician: Gasper Scott MD    Discharge Diagnoses:    Pelvic fracture    Hyponatremia    Hypertension  Urinary tract infection which was present on mission      Presenting Problem/History of Present Illness  Pelvic fracture [S32.9XXA]       Hospital Course  Patient is a 89-year-old female who suffered a fall at home and had a pelvic fracture.  Initially significant pain with weightbearing.  She was also found evidence of urinary tract infection which was treated.  She had mild hyponatremia which improved slightly with IV fluids.  She has been weightbearing as tolerated and up with the help of a walker and physical therapy.  She is progressing her ambulation.  Appetite has been good.  She had some issues with agitation and sleep mostly related to needing to get up to urinate.  We started her on low-dose trazodone every evening and this has helped with her nighttime agitation and sleep.  At this point she is medically stable but needs further rehabilitation and has been accepted to skilled nursing facility    Procedures Performed         Consults:   Consults       No orders found from 12/21/2023 to 1/20/2024.            Pertinent Test Results:  Lab Results (most recent)       Procedure Component Value Units Date/Time    Urine Culture - Urine, Urine, Clean Catch [[id number]]  (Abnormal)  (Susceptibility) Collected: 01/22/24 1649    Specimen: Urine, Clean Catch Updated: 01/25/24 1010     Urine Culture >100,000 CFU/mL Klebsiella pneumoniae ssp pneumoniae    Narrative:      Colonization of the urinary tract without infection is common. Treatment is discouraged unless the patient is symptomatic, pregnant, or undergoing an invasive urologic procedure.    Susceptibility        Klebsiella pneumoniae ssp pneumoniae      MANUEL      Amoxicillin + Clavulanate Susceptible      Ampicillin Resistant      Ampicillin +  Sulbactam Susceptible      Cefazolin Susceptible      Cefepime Susceptible      Ceftazidime Susceptible      Ceftriaxone Susceptible      Gentamicin Susceptible      Levofloxacin Susceptible      Meropenem Intermediate      Nitrofurantoin Intermediate      Trimethoprim + Sulfamethoxazole Susceptible                           Comprehensive Metabolic Panel [[id number]]  (Abnormal) Collected: 01/24/24 0556    Specimen: Blood Updated: 01/24/24 0635     Glucose 94 mg/dL      BUN 12 mg/dL      Creatinine 0.47 mg/dL      Sodium 133 mmol/L      Potassium 4.2 mmol/L      Chloride 98 mmol/L      CO2 27.0 mmol/L      Calcium 8.2 mg/dL      Total Protein 6.3 g/dL      Albumin 2.8 g/dL      ALT (SGPT) 6 U/L      AST (SGOT) 12 U/L      Alkaline Phosphatase 58 U/L      Total Bilirubin 0.6 mg/dL      Globulin 3.5 gm/dL      A/G Ratio 0.8 g/dL      BUN/Creatinine Ratio 25.5     Anion Gap 8.0 mmol/L      eGFR 89.4 mL/min/1.73     Narrative:      GFR Normal >60  Chronic Kidney Disease <60  Kidney Failure <15    The GFR formula is only valid for adults with stable renal function between ages 18 and 70.    CBC & Differential [[id number]]  (Abnormal) Collected: 01/24/24 0556    Specimen: Blood Updated: 01/24/24 0606    Narrative:      The following orders were created for panel order CBC & Differential.  Procedure                               Abnormality         Status                     ---------                               -----------         ------                     CBC Auto Differential[[id number]]        Abnormal            Final result                 Please view results for these tests on the individual orders.    CBC Auto Differential [[id number]]  (Abnormal) Collected: 01/24/24 0556    Specimen: Blood Updated: 01/24/24 0606     WBC 6.21 10*3/mm3      RBC 4.33 10*6/mm3      Hemoglobin 13.1 g/dL      Hematocrit 39.1 %      MCV 90.3 fL      MCH 30.3 pg      MCHC 33.5 g/dL      RDW 13.5 %      RDW-SD 45.3 fl      MPV 9.6 fL       Platelets 242 10*3/mm3      Neutrophil % 61.9 %      Lymphocyte % 21.3 %      Monocyte % 13.0 %      Eosinophil % 2.7 %      Basophil % 0.8 %      Immature Grans % 0.3 %      Neutrophils, Absolute 3.84 10*3/mm3      Lymphocytes, Absolute 1.32 10*3/mm3      Monocytes, Absolute 0.81 10*3/mm3      Eosinophils, Absolute 0.17 10*3/mm3      Basophils, Absolute 0.05 10*3/mm3      Immature Grans, Absolute 0.02 10*3/mm3      nRBC 0.0 /100 WBC     Urinalysis With Culture If Indicated - Urine, Clean Catch [[id number]]  (Abnormal) Collected: 01/22/24 1649    Specimen: Urine, Clean Catch Updated: 01/22/24 1710     Color, UA Yellow     Appearance, UA Cloudy     pH, UA 6.5     Specific Gravity, UA 1.012     Glucose, UA Negative     Ketones, UA Negative     Bilirubin, UA Negative     Blood, UA Small (1+)     Protein, UA Negative     Leuk Esterase, UA Large (3+)     Nitrite, UA Negative     Urobilinogen, UA 0.2 E.U./dL    Narrative:      In absence of clinical symptoms, the presence of pyuria, bacteria, and/or nitrites on the urinalysis result does not correlate with infection.    Urinalysis, Microscopic Only - Urine, Clean Catch [[id number]]  (Abnormal) Collected: 01/22/24 1649    Specimen: Urine, Clean Catch Updated: 01/22/24 1710     RBC, UA 3-5 /HPF      WBC, UA 11-20 /HPF      Bacteria, UA 3+ /HPF      Squamous Epithelial Cells, UA None Seen /HPF      Hyaline Casts, UA None Seen /LPF      Methodology Manual Light Microscopy    Osmolality, Urine - Urine, Clean Catch [[id number]]  (Normal) Collected: 01/22/24 0942    Specimen: Urine, Clean Catch Updated: 01/22/24 1121     Osmolality, Urine 917 mOsm/kg     Osmolality, Serum [[id number]]  (Normal) Collected: 01/22/24 0831    Specimen: Blood Updated: 01/22/24 1118     Osmolality 284 mOsm/kg     Comprehensive Metabolic Panel [[id number]]  (Abnormal) Collected: 01/22/24 0831    Specimen: Blood Updated: 01/22/24 1033     Glucose 95 mg/dL      BUN 16 mg/dL      Creatinine 0.49 mg/dL       Sodium 132 mmol/L      Potassium 4.3 mmol/L      Chloride 95 mmol/L      CO2 28.0 mmol/L      Calcium 8.5 mg/dL      Total Protein 6.9 g/dL      Albumin 3.3 g/dL      ALT (SGPT) 8 U/L      AST (SGOT) 15 U/L      Alkaline Phosphatase 63 U/L      Total Bilirubin 0.8 mg/dL      Globulin 3.6 gm/dL      A/G Ratio 0.9 g/dL      BUN/Creatinine Ratio 32.7     Anion Gap 9.0 mmol/L      eGFR 88.6 mL/min/1.73     Narrative:      GFR Normal >60  Chronic Kidney Disease <60  Kidney Failure <15    The GFR formula is only valid for adults with stable renal function between ages 18 and 70.    Sodium, Urine, Random - Urine, Clean Catch [[id number]] Collected: 01/22/24 0942    Specimen: Urine, Clean Catch Updated: 01/22/24 1013     Sodium, Urine 61 mmol/L     Narrative:      Reference intervals for random urine have not been established.  Clinical usage is dependent upon physician's interpretation in combination with other laboratory tests.       CBC & Differential [[id number]]  (Abnormal) Collected: 01/22/24 0831    Specimen: Blood Updated: 01/22/24 1002    Narrative:      The following orders were created for panel order CBC & Differential.  Procedure                               Abnormality         Status                     ---------                               -----------         ------                     CBC Auto Differential[[id number]]        Abnormal            Final result                 Please view results for these tests on the individual orders.    CBC Auto Differential [[id number]]  (Abnormal) Collected: 01/22/24 0831    Specimen: Blood Updated: 01/22/24 1002     WBC 6.80 10*3/mm3      RBC 4.66 10*6/mm3      Hemoglobin 14.2 g/dL      Hematocrit 42.7 %      MCV 91.6 fL      MCH 30.5 pg      MCHC 33.3 g/dL      RDW 13.9 %      RDW-SD 46.8 fl      MPV 10.3 fL      Platelets 239 10*3/mm3      Neutrophil % 67.5 %      Lymphocyte % 16.6 %      Monocyte % 12.4 %      Eosinophil % 2.5 %      Basophil % 0.7 %      Immature Grans  % 0.3 %      Neutrophils, Absolute 4.59 10*3/mm3      Lymphocytes, Absolute 1.13 10*3/mm3      Monocytes, Absolute 0.84 10*3/mm3      Eosinophils, Absolute 0.17 10*3/mm3      Basophils, Absolute 0.05 10*3/mm3      Immature Grans, Absolute 0.02 10*3/mm3      nRBC 0.0 /100 WBC     Protime-INR [[id number]]  (Normal) Collected: 01/19/24 1902    Specimen: Blood Updated: 01/19/24 1921     Protime 13.2 Seconds      INR 0.99    aPTT [[id number]]  (Normal) Collected: 01/19/24 1902    Specimen: Blood Updated: 01/19/24 1921     PTT 27.8 seconds             Condition on Discharge: Stable    Discharge Disposition  Skilled Nursing Facility ([place] - External)    Discharge Medications     Discharge Medications        New Medications        Instructions Start Date   acetaminophen 325 MG tablet  Commonly known as: TYLENOL   650 mg, Oral, Every 4 Hours PRN      docusate sodium 100 MG capsule   100 mg, Oral, 2 Times Daily      traZODone 50 MG tablet  Commonly known as: DESYREL   25 mg, Oral, Nightly             Continue These Medications        Instructions Start Date   Calcium Carbonate 1500 (600 Ca) MG tablet   600 mg, Oral, 2 Times Daily With Meals      vitamin D 1.25 MG (90847 UT) capsule capsule  Commonly known as: ERGOCALCIFEROL   50,000 Units, Oral, Every 14 Days               Discharge Diet:   Diet Instructions       Diet: Regular/House Diet; Thin (IDDSI 0)      Discharge Diet: Regular/House Diet    Fluid Consistency: Thin (IDDSI 0)            Discharge Care Plan / Instructions:    Activity at Discharge:   Activity Instructions       Other Activity Instructions      Activity Instructions: WBAT with walker    Up WIth Assist              Follow-up Appointments  No future appointments.      Test Results Pending at Discharge       Gasper Scott MD  01/26/24  08:55 CST        Part of this note may be an electronic transcription/translation of spoken language to printed text using the Dragon Dictation System.

## 2024-01-26 NOTE — PLAN OF CARE
Goal Outcome Evaluation:  Plan of Care Reviewed With: patient, daughter   S/P fall w/ pelvic fx - weight bearing as tolerated; pain meds per mar; dc to [facility] in am; had UTI and completed abx regimen; PT/OT following

## 2024-01-26 NOTE — THERAPY DISCHARGE NOTE
Acute Care - Occupational Therapy Discharge  [facility]    Patient Name: Corinne Rivera  : 1931    MRN: [id number]                              Today's Date: 2024       Admit Date: 2024    Visit Dx:     ICD-10-CM ICD-9-CM   1. Ground-level fall  W18.30XA E888.9   2. Closed fracture of left pubis, unspecified portion of pubis, initial encounter  S32.502A 808.2   3. Ambulatory dysfunction  R26.2 719.7   4. Impaired mobility [Z74.09]  Z74.09 799.89     Patient Active Problem List   Diagnosis    Asymptomatic postmenopausal status    Hyponatremia    Hypertension    Acute metabolic encephalopathy    Fever    Thrombocytopenia    Elevated liver enzymes    Pelvic fracture     Past Medical History:   Diagnosis Date    Breast cancer     right     Hx of radiation therapy     Hypertension     Vitamin D deficiency      Past Surgical History:   Procedure Laterality Date    APPENDECTOMY      BREAST BIOPSY      BREAST LUMPECTOMY Right 1995    BREAST LUMPECTOMY Right     HYSTERECTOMY        General Information       Row Name 24 1057          OT Time and Intention    Document Type therapy note (daily note)  -LS     Mode of Treatment occupational therapy  -LS       Row Name 24 1057          General Information    Patient Profile Reviewed yes  -LS     Existing Precautions/Restrictions fall  WBAT LLE  -LS       Row Name 24 1057          Cognition    Orientation Status (Cognition) oriented to;person  -LS       Row Name 24 1057          Safety Issues, Functional Mobility    Safety Issues Affecting Function (Mobility) ability to follow commands;insight into deficits/self-awareness;safety precaution awareness;safety precautions follow-through/compliance;sequencing abilities;at risk behavior observed;awareness of need for assistance;judgment;positioning of assistive device;problem-solving  -LS     Impairments Affecting Function (Mobility) cognition;strength;pain  -LS     Cognitive Impairments,  Mobility Safety/Performance attention;awareness, need for assistance;insight into deficits/self-awareness;judgment;problem-solving/reasoning;safety precaution awareness;safety precaution follow-through;sequencing abilities  -               User Key  (r) = Recorded By, (t) = Taken By, (c) = Cosigned By      Initials Name Provider Type    Nalini Spears OTR/L Occupational Therapist                   Mobility/ADL's       Row Name 01/26/24 1057          Transfers    Transfers sit-stand transfer;stand-sit transfer;toilet transfer  -       Row Name 01/26/24 1057          Sit-Stand Transfer    Sit-Stand Sherman (Transfers) contact guard;verbal cues  -     Assistive Device (Sit-Stand Transfers) walker, front-wheeled  -LS       Row Name 01/26/24 1057          Stand-Sit Transfer    Stand-Sit Sherman (Transfers) contact guard  -       Row Name 01/26/24 1057          Toilet Transfer    Type (Toilet Transfer) sit-stand;stand-sit  -LS     Sherman Level (Toilet Transfer) contact guard;verbal cues  -LS     Assistive Device (Toilet Transfer) grab bars/safety frame;walker, front-wheeled  -LS       Row Name 01/26/24 1057          Functional Mobility    Functional Mobility- Ind. Level contact guard assist  Pt ambulated from chair<>BR utilizing rwx with CGA and intemittent verbal cues for positioning of AD.  -LS     Functional Mobility- Device walker, front-wheeled  -LS     Patient was able to Ambulate yes  -[facility] Name 01/26/24 1057          Activities of Daily Living    BADL Assessment/Intervention toileting  -       Row Name 01/26/24 1057          Toileting Assessment/Training    Sherman Level (Toileting) toileting skills;contact guard assist;verbal cues  -LS     Position (Toileting) unsupported sitting;supported standing  -               User Key  (r) = Recorded By, (t) = Taken By, (c) = Cosigned By      Initials Name Provider Type    Nalini Spears OTR/L Occupational Therapist                    Obj/Interventions    No documentation.                  Goals/Plan    No documentation.                  Clinical Impression       Row Name 01/26/24 1057          Pain Scale: FACES Pre/Post-Treatment    Pain: FACES Scale, Pretreatment 0-->no hurt  -LS     Posttreatment Pain Rating 0-->no hurt  -LS     Pre/Posttreatment Pain Comment Pt increases with fxl activity in LLE  -       Row Name 01/26/24 1057          Plan of Care Review    Plan of Care Reviewed With patient;daughter  -     Progress improving  -     Outcome Evaluation OT tx completed. Pt seated in recliner upon therapist arrival; A&O to person; No c/o pain at rest; Pt's daughter also present. Pt performed LB dressing while sitting/standing as appropriate with Min A and verbal cues for sequencing. Pt performed sit<>stand with CGA and verbal cues for sequencing. Pt ambulated from chair<>BR utilizing rwx with CGA and intemittent verbal cues for positioning of AD. Pt performed sit<>stand toilet transfer and all toileting tasks with CGA and verbal cues for sequencing and safety awareness. Pt discharging to SNF for continued rehab on this date.  -       Row Name 01/26/24 1057          Therapy Plan Review/Discharge Plan (OT)    Anticipated Discharge Disposition (OT) skilled nursing facility  -       Row Name 01/26/24 1057          Positioning and Restraints    Pre-Treatment Position sitting in chair/recliner  -LS     Post Treatment Position chair  -LS     In Chair reclined;call light within reach;encouraged to call for assist;exit alarm on;legs elevated;with family/caregiver  -               User Key  (r) = Recorded By, (t) = Taken By, (c) = Cosigned By      Initials Name Provider Type    Nalini Spears, OTR/L Occupational Therapist                   Outcome Measures       Row Name 01/26/24 1057          How much help from another is currently needed...    Putting on and taking off regular lower body clothing? 3  -LS     Bathing (including  washing, rinsing, and drying) 3  -LS     Toileting (which includes using toilet bed pan or urinal) 3  -LS     Putting on and taking off regular upper body clothing 3  -LS     Taking care of personal grooming (such as brushing teeth) 3  -LS     Eating meals 4  -LS     AM-PAC 6 Clicks Score (OT) 19  -LS       Row Name 01/26/24 0913 01/26/24 0800       How much help from another person do you currently need...    Turning from your back to your side while in flat bed without using bedrails? 3  -CB 3  -AH    Moving from lying on back to sitting on the side of a flat bed without bedrails? 3  -CB 3  -AH    Moving to and from a bed to a chair (including a wheelchair)? 3  -CB 3  -AH    Standing up from a chair using your arms (e.g., wheelchair, bedside chair)? 3  -CB 3  -AH    Climbing 3-5 steps with a railing? 2  -CB 2  -AH    To walk in hospital room? 3  -CB 3  -AH    AM-PAC 6 Clicks Score (PT) 17  -CB 17  -AH    Highest Level of Mobility Goal 5 --> Static standing  -CB 5 --> Static standing  -AH      Row Name 01/26/24 1057 01/26/24 0800       Functional Assessment    Outcome Measure Options AM-PAC 6 Clicks Daily Activity (OT)  -LS AM-PAC 6 Clicks Basic Mobility (PT)  -AH              User Key  (r) = Recorded By, (t) = Taken By, (c) = Cosigned By      Initials Name Provider Type     Patricia Schneider, PTA Physical Therapist Assistant    Sandy Rodriguez LPN Licensed Nurse    Nalini Spears, OTR/L Occupational Therapist                  Occupational Therapy Education       Title: PT OT SLP Therapies (In Progress)       Topic: Occupational Therapy (In Progress)       Point: ADL training (In Progress)       Description:   Instruct learner(s) on proper safety adaptation and remediation techniques during self care or transfers.   Instruct in proper use of assistive devices.                  Learning Progress Summary             Patient Acceptance, E, VU,NR by LES at 1/26/2024 1150    Acceptance, E, VU by KS at 1/24/2024  1507    Acceptance, E, VU,NR by LS at 1/24/2024 1300    Acceptance, E, NR by MK at 1/24/2024 0113    Acceptance, E, NR by MK at 1/22/2024 2243    Acceptance, E, VU,NR by LS at 1/20/2024 1103   Family Acceptance, E, VU by KS at 1/24/2024 1507   Significant Other Acceptance, E, NR by MK at 1/24/2024 0113    Acceptance, E, NR by MK at 1/22/2024 2243                         Point: Home exercise program (In Progress)       Description:   Instruct learner(s) on appropriate technique for monitoring, assisting and/or progressing therapeutic exercises/activities.                  Learning Progress Summary             Patient Acceptance, E, VU by KS at 1/24/2024 1507    Acceptance, E, NR by MK at 1/24/2024 0113    Acceptance, E, NR by MK at 1/22/2024 2243   Family Acceptance, E, VU by KS at 1/24/2024 1507   Significant Other Acceptance, E, NR by MK at 1/24/2024 0113    Acceptance, E, NR by MK at 1/22/2024 2243                         Point: Precautions (In Progress)       Description:   Instruct learner(s) on prescribed precautions during self-care and functional transfers.                  Learning Progress Summary             Patient Acceptance, E, VU,NR by LS at 1/26/2024 1150    Acceptance, E, VU by KS at 1/24/2024 1507    Acceptance, E, VU,NR by LS at 1/24/2024 1300    Acceptance, E, NR by MK at 1/24/2024 0113    Acceptance, E, NR by MK at 1/22/2024 2243    Acceptance, E, VU,NR by LS at 1/20/2024 1103   Family Acceptance, E, VU by KS at 1/24/2024 1507   Significant Other Acceptance, E, NR by MK at 1/24/2024 0113    Acceptance, E, NR by MK at 1/22/2024 2243                         Point: Body mechanics (In Progress)       Description:   Instruct learner(s) on proper positioning and spine alignment during self-care, functional mobility activities and/or exercises.                  Learning Progress Summary             Patient Acceptance, E, VU,NR by LS at 1/26/2024 1150    Acceptance, E, VU by KS at 1/24/2024 6359     Acceptance, E, VU,NR by LS at 1/24/2024 1300    Acceptance, E, NR by  at 1/24/2024 0113    Acceptance, E, NR by  at 1/22/2024 2243    Acceptance, E, VU,NR by LS at 1/20/2024 1103   Family Acceptance, E, VU by KS at 1/24/2024 1507   Significant Other Acceptance, E, NR by MK at 1/24/2024 0113    Acceptance, E, NR by  at 1/22/2024 2243                                         User Key       Initials Effective Dates Name Provider Type Discipline    KS 02/27/23 -  Kathleen Nicole, RN Registered Nurse Nurse    LES 06/20/22 -  Nalini Barkley OTR/L Occupational Therapist OT     04/25/23 -  Shravan Larsen, ELIZABETH Registered Nurse Nurse                  OT Recommendation and Plan  Planned Therapy Interventions (OT): activity tolerance training, functional balance retraining, occupation/activity based interventions, ROM/therapeutic exercise, strengthening exercise, transfer/mobility retraining, patient/caregiver education/training, adaptive equipment training, BADL retraining  Therapy Frequency (OT): 5 times/wk  Plan of Care Review  Plan of Care Reviewed With: patient, daughter  Progress: improving  Outcome Evaluation: OT tx completed. Pt seated in recliner upon therapist arrival; A&O to person; No c/o pain at rest; Pt's daughter also present. Pt performed LB dressing while sitting/standing as appropriate with Min A and verbal cues for sequencing. Pt performed sit<>stand with CGA and verbal cues for sequencing. Pt ambulated from chair<>BR utilizing rwx with CGA and intemittent verbal cues for positioning of AD. Pt performed sit<>stand toilet transfer and all toileting tasks with CGA and verbal cues for sequencing and safety awareness. Pt discharging to SNF for continued rehab on this date.  Plan of Care Reviewed With: patient, daughter  Outcome Evaluation: OT tx completed. Pt seated in recliner upon therapist arrival; A&O to person; No c/o pain at rest; Pt's daughter also present. Pt performed LB dressing while  sitting/standing as appropriate with Min A and verbal cues for sequencing. Pt performed sit<>stand with CGA and verbal cues for sequencing. Pt ambulated from chair<>BR utilizing rwx with CGA and intemittent verbal cues for positioning of AD. Pt performed sit<>stand toilet transfer and all toileting tasks with CGA and verbal cues for sequencing and safety awareness. Pt discharging to SNF for continued rehab on this date.     Time Calculation:         Time Calculation- OT       Row Name 01/26/24 1057 01/26/24 0830          Time Calculation- OT    OT Start Time 1057  -LS --     OT Stop Time 1121  -LS --     OT Time Calculation (min) 24 min  -LS --     Total Timed Code Minutes- OT 24 minute(s)  -LS --     OT Received On 01/26/24  -LS --        Timed Charges    36093 - Gait Training Minutes  -- 23  -AH        Total Minutes    Timed Charges Total Minutes -- 23  -AH      Total Minutes -- 23  -AH               User Key  (r) = Recorded By, (t) = Taken By, (c) = Cosigned By      Initials Name Provider Type    Patricia Donovan, PTA Physical Therapist Assistant     Nalini Barkley OTR/L Occupational Therapist                  Therapy Charges for Today       Code Description Service Date Service Provider Modifiers Qty    [id number] HC OT SELF CARE/MGMT/TRAIN EA 15 MIN 1/26/2024 Nalini Barkley OTR/L GO 2               OT Discharge Summary  Anticipated Discharge Disposition (OT): skilled nursing facility  Reason for Discharge: Discharge from facility  Outcomes Achieved: Refer to plan of care for updates on goals achieved  Discharge Destination: SNF    LANCE Carrillo  1/26/2024

## 2024-07-30 ENCOUNTER — HOSPITAL ENCOUNTER (EMERGENCY)
Facility: HOSPITAL | Age: 89
Discharge: HOME | End: 2024-07-30
Payer: MEDICARE

## 2024-07-30 VITALS
RESPIRATION RATE: 18 BRPM | BODY MASS INDEX: 22.67 KG/M2 | WEIGHT: 108 LBS | HEIGHT: 58 IN | DIASTOLIC BLOOD PRESSURE: 80 MMHG | SYSTOLIC BLOOD PRESSURE: 135 MMHG | OXYGEN SATURATION: 96 % | HEART RATE: 80 BPM | TEMPERATURE: 98 F

## 2024-07-30 DIAGNOSIS — Z51.89 VISIT FOR WOUND CHECK: Primary | ICD-10-CM

## 2024-07-30 DIAGNOSIS — S81.819A SKIN TEAR OF LOWER LEG WITHOUT COMPLICATION, UNSPECIFIED LATERALITY, INITIAL ENCOUNTER: ICD-10-CM

## 2024-07-30 PROCEDURE — 99283 EMERGENCY DEPT VISIT LOW MDM: CPT

## 2024-07-30 RX ORDER — CEPHALEXIN 500 MG/1
500 CAPSULE ORAL 4 TIMES DAILY
Qty: 28 CAPSULE | Refills: 0 | Status: SHIPPED | OUTPATIENT
Start: 2024-07-30 | End: 2024-08-06

## 2024-07-30 RX ORDER — MUPIROCIN CALCIUM 20 MG/G
1 CREAM TOPICAL 3 TIMES DAILY
Qty: 15 G | Refills: 0 | Status: SHIPPED | OUTPATIENT
Start: 2024-07-30

## 2024-07-30 RX ADMIN — MUPIROCIN 1 APPLICATION: 20 OINTMENT TOPICAL at 13:58

## 2024-07-30 NOTE — ED PROVIDER NOTES
Subjective   History of Present Illness  Patient is a 89-year-old female that presents to the emergency department with daughter requesting wound check.  Daughter reports patient fell into a bathtub causing skin tears to both shins on 7/13/24.  Daughter states patient has no complaints since the fall other than skin tears to both lower extremities.  She states that patient is able to ambulate without difficulty.  Denies patient hitting her head or any loss of consciousness during said fall. She states that since fall she has been dressing the affected areas at home but has noticed patient has been reporting increased pain localized to wounds when she changes her dressings.  Daughter reports she has been changing dressings once daily and dressings consist regular gauze that are adhesive to wounds.  She states that patient has had no other complaints besides increased pain to the affected areas.  Daughter reports she did try to reach out to primary care provider office yesterday show to establish follow-up regarding need for a wound check but their office had already closed for the day.  Daughter denies any complaints of fevers, body aches, chills, cough, congestion.  Denies any chest pain, shortness of breath, abdominal pain, nausea, vomiting, constipation or diarrhea.  Denies any lightheadedness, dizziness, blurred vision, headache or near syncope.  Daughter denies any anticoagulation use at this time.  She denies any significant amount of drainage and states that only drainage that has been noted to come from the affected areas is bloody.  She denies any purulent drainage or lower extremity swelling.        Review of Systems   Skin:  Positive for wound.   All other systems reviewed and are negative.      Past Medical History:   Diagnosis Date    Breast cancer 1995    right     Hx of radiation therapy     Hypertension     Vitamin D deficiency        Allergies   Allergen Reactions    Penicillins Hives       Past  Surgical History:   Procedure Laterality Date    APPENDECTOMY      BREAST BIOPSY      BREAST LUMPECTOMY Right 1995    BREAST LUMPECTOMY Right     HYSTERECTOMY         Family History   Problem Relation Age of Onset    Breast cancer Mother     No Known Problems Father     No Known Problems Sister     No Known Problems Brother     No Known Problems Daughter     No Known Problems Son     No Known Problems Maternal Grandmother     No Known Problems Paternal Grandmother     No Known Problems Maternal Aunt     No Known Problems Paternal Aunt     No Known Problems Other     BRCA 1/2 Neg Hx     Colon cancer Neg Hx     Endometrial cancer Neg Hx     Ovarian cancer Neg Hx        Social History     Socioeconomic History    Marital status:    Tobacco Use    Smoking status: Never    Smokeless tobacco: Never   Vaping Use    Vaping status: Never Used   Substance and Sexual Activity    Alcohol use: No    Drug use: No    Sexual activity: Not Currently           Objective   Physical Exam  Vitals and nursing note reviewed.   Constitutional:       Appearance: Normal appearance.      Comments: Nontoxic appearing. In no acute distress.    HENT:      Head: Normocephalic and atraumatic.      Right Ear: External ear normal.      Left Ear: External ear normal.      Nose: Nose normal.      Mouth/Throat:      Mouth: Mucous membranes are moist.      Pharynx: Oropharynx is clear.   Eyes:      Extraocular Movements: Extraocular movements intact.      Conjunctiva/sclera: Conjunctivae normal.      Pupils: Pupils are equal, round, and reactive to light.   Cardiovascular:      Rate and Rhythm: Normal rate and regular rhythm.      Pulses: Normal pulses.      Heart sounds: Normal heart sounds.   Pulmonary:      Effort: Pulmonary effort is normal. No respiratory distress.      Breath sounds: Normal breath sounds. No wheezing.   Chest:      Chest wall: No tenderness.   Abdominal:      General: Bowel sounds are normal. There is no distension.       Palpations: Abdomen is soft.      Tenderness: There is no abdominal tenderness. There is no right CVA tenderness, left CVA tenderness, guarding or rebound.   Musculoskeletal:         General: Normal range of motion.      Cervical back: Normal range of motion and neck supple.      Right lower leg: No edema.      Left lower leg: No edema.   Skin:     General: Skin is warm and dry.      Capillary Refill: Capillary refill takes less than 2 seconds.      Comments: Skin tears noted bilaterally to lateral aspect of each shin.  Skin tears are superficial and appear to be healing.  No obvious warmth to touch, erythema, or swelling noted.  No purulent drainage noted.  There is minimal bleeding noted.  Patient is able to ambulate on extremities without difficulty and only reports pain localized to open wounds upon palpation.  She is neurovascular intact.   Neurological:      General: No focal deficit present.      Mental Status: She is alert. Mental status is at baseline.   Psychiatric:         Mood and Affect: Mood normal.         Behavior: Behavior normal.         Thought Content: Thought content normal.         Judgment: Judgment normal.         Procedures           ED Course                                             Medical Decision Making  Corinne Rivear is a 89 y.o. female who presents to the ED with daughter requesting wound check.  Daughter reports patient fell into a bathtub causing skin tears to both shins on 7/13/24.  Daughter states patient has no complaints since the fall other than skin tears to both lower extremities.  She states that patient is able to ambulate without difficulty.  Denies patient hitting her head or any loss of consciousness during said fall. She states that since fall she has been dressing the affected areas at home but has noticed patient has been reporting increased pain localized to wounds when she changes her dressings.  Daughter reports she has been changing dressings once daily and  dressings consist regular gauze that are adhesive to wounds.  She states that patient has had no other complaints besides increased pain to the affected areas.  Daughter reports she did try to reach out to primary care provider office yesterday show to establish follow-up regarding need for a wound check but their office had already closed for the day.  Daughter denies any complaints of fevers, body aches, chills, cough, congestion.  Denies any chest pain, shortness of breath, abdominal pain, nausea, vomiting, constipation or diarrhea.  Denies any lightheadedness, dizziness, blurred vision, headache or near syncope.  Daughter denies any anticoagulation use at this time.  She denies any significant amount of drainage and states that only drainage that has been noted to come from the affected areas is bloody.  She denies any purulent drainage or lower extremity swelling.    Patient was non-toxic appearing on arrival. No acute distress was noted.  Vital signs stable.     Past medical history, surgical history, and medication regimen reviewed.     Previous notes, labs, imaging and more reviewed.    Patient's presentation raises suspicion for differentials including, but not limited to, skin tear, cellulitis, wound infection, encounter for wound check.    Medications administered,   mupirocin (BACTROBAN) 2 % ointment 1 Application (1 Application Topical Given 7/30/24 1358)     Given findings described above, patient's presentation is likely consistent with bilateral lower extremity skin tears. I have a low suspicion for infection, cellulitis or abscess at this point in their ED course.     Skin tears were thoroughly cleaned and dressed with mupirocin cream, nonadhesive dressing as well as bulky gauze over nonadhesive dressing.  Daughter was educated at length on wound care management.  Also discussed that Dr. Scott's office was made aware of patient presenting to the ED and will follow-up closely with patient.  I also  provided daughter with wound care office information to establish an appointment with wound care as well.  Discussed that patient be discharged with prescriptions for mupirocin cream as well as oral antibiotics for infection prevention.  Patient and daughter were educated on concerning signs and symptoms that would warrant a quick return to the ED and verbalized understanding of this. I answered all the questions regarding the emergency department evaluation, diagnosis, and treatment plan in plain and simple language that was understandable. We discussed that due to always having some diagnostic uncertainty while in the ER, there is always a chance that symptoms may change or new symptoms may reveal themselves after being discharged. Because of this, I stressed the importance of Corinne following up with their PCP and wound care. Patient and daughter informed that appointment will need to be done by calling their office to set up an appointment within the next few days or as soon as reasonably possible so that the symptoms can be re-evaluated for improvement or for any other questions. I also gave Corinne and her daughter common sense return precautions and prompted patient to return to the emergency department within 24 - 48hrs if there are any new, worsening, or concerning symptoms. The patient and daughter verbalized understanding of the discharge instructions and agreed with them. Corinne was discharged in stable condition.     Dragon disclaimer:  Parts of this note may be an electronic transcription/translation of spoken language to printed text using the Dragon dictation system.       Problems Addressed:  Skin tear of lower leg without complication, unspecified laterality, initial encounter: complicated acute illness or injury  Visit for wound check: complicated acute illness or injury    Risk  Prescription drug management.        Final diagnoses:   Visit for wound check   Skin tear of lower leg without complication,  unspecified laterality, initial encounter       ED Disposition  ED Disposition       ED Disposition   Discharge    Condition   Stable    Comment   --               Gasper Scott MD  [address] [facility] [address]  [phone]    Schedule an appointment as soon as possible for a visit       [facility]  [address]  [facility] [address] [phone]  Schedule an appointment as soon as possible for a visit       [facility] EMERGENCY DEPART[facility]  [address] [address] [phone]    If symptoms worsen         Medication List        New Prescriptions      cephalexin 500 MG capsule  Commonly known as: KEFLEX  Take 1 capsule by mouth 4 (Four) Times a Day for 7 days.     mupirocin 2 % cream  Commonly known as: BACTROBAN  Apply 1 Application topically to the appropriate area as directed 3 (Three) Times a Day.               Where to Get Your Medications        These medications were sent to [facility] - [place], [place] - [address] - [phone] [facility] [phone]   [address] [address]      Phone: [phone]   cephalexin 500 MG capsule  mupirocin 2 % cream            Philipp Finn, APRN  07/30/24 1440

## 2024-07-30 NOTE — DISCHARGE INSTRUCTIONS
It was very nice to meet you, Corinne. Thank you for allowing us to take care of you today at [facility].    Today you were seen in the emergency department for your symptoms. Please understand that an ER evaluation is just the start of your evaluation. We do the best we can, but we are often unable to fully find what is causing your symptoms from one evaluation.  Because of this, the goal is to determine whether you need to be evaluated in the hospital or if it is safe for you to go home and see other doctors provided such as primary care physicians or specialist on an outpatient basis.     Like we discussed, I strongly urge that you follow up with your primary care doctor. Please call their office to set up an appointment as soon as possible so that you can be re-evaluated for improvement in your symptoms or for any other questions.  I have provided the information needed, including phone number, to call to set up an appointment below in these discharge papers.     Educational material has also been provided in the following pages regarding what we have discussed today.  Please use the supplies provided for wound care and follow-up with both primary care provider and wound care. Please keep wounds clean and dressing areas with antibiotic ointment, nonadhesive dressing and gauze.    MEDICATIONS PRESCRIBED: Mupirocin cream and oral antibiotic as prescribed for infection prophylaxis.    Please return to the emergency room within 12-48 hours if you experience symptoms such as the following:   Fever, chills, chest pain or shortness of breath, pain with inspiration/expiration, pain that travels to your arms, neck or back, nausea, vomiting, severe headache, tearing pain in your chest, dizziness, feel as though you are about to pass out, OR if you have any worsening symptoms, or any other concerns.

## 2024-07-30 NOTE — ED NOTES
Wounds on bilateral legs cleansed with surgical scrub. Bactroban, Vaseline gauze and roll gauze placed on wounds at this time.

## 2024-08-06 ENCOUNTER — OFFICE VISIT (OUTPATIENT)
Dept: WOUND CARE | Facility: HOSPITAL | Age: 89
End: 2024-08-06
Payer: MEDICARE

## 2024-08-06 DIAGNOSIS — Z91.81 HISTORY OF FALLING: ICD-10-CM

## 2024-08-06 DIAGNOSIS — S81.812D LACERATION WITHOUT FOREIGN BODY, LEFT LOWER LEG, SUBSEQUENT ENCOUNTER: Primary | ICD-10-CM

## 2024-08-06 DIAGNOSIS — S81.811D LACERATION WITHOUT FOREIGN BODY, RIGHT LOWER LEG, SUBSEQUENT ENCOUNTER: ICD-10-CM

## 2024-08-06 DIAGNOSIS — A49.9 BACTERIAL INFECTION, UNSPECIFIED: ICD-10-CM

## 2024-08-06 PROCEDURE — G0463 HOSPITAL OUTPT CLINIC VISIT: HCPCS

## 2024-08-16 ENCOUNTER — OFFICE VISIT (OUTPATIENT)
Dept: WOUND CARE | Facility: HOSPITAL | Age: 89
End: 2024-08-16
Payer: MEDICARE

## 2024-08-16 DIAGNOSIS — Z91.81 HISTORY OF FALLING: ICD-10-CM

## 2024-08-16 DIAGNOSIS — S81.812D LACERATION WITHOUT FOREIGN BODY, LEFT LOWER LEG, SUBSEQUENT ENCOUNTER: Primary | ICD-10-CM

## 2024-08-16 PROCEDURE — 11042 DBRDMT SUBQ TIS 1ST 20SQCM/<: CPT

## 2024-08-23 ENCOUNTER — OFFICE VISIT (OUTPATIENT)
Dept: WOUND CARE | Facility: HOSPITAL | Age: 89
End: 2024-08-23
Payer: MEDICARE

## 2024-08-23 DIAGNOSIS — S81.812D LACERATION WITHOUT FOREIGN BODY, LEFT LOWER LEG, SUBSEQUENT ENCOUNTER: Primary | ICD-10-CM

## 2024-08-23 DIAGNOSIS — Z91.81 HISTORY OF FALLING: ICD-10-CM

## 2024-08-23 PROCEDURE — G0463 HOSPITAL OUTPT CLINIC VISIT: HCPCS

## 2024-08-23 PROCEDURE — 99202 OFFICE O/P NEW SF 15 MIN: CPT | Performed by: PODIATRIST

## (undated) DEVICE — SURGICAL PROCEDURE PACK LT TBNG CUST LF DISP

## (undated) DEVICE — NO USE 18 MONTHS PACKS SURG OPTHLM CATARACT